# Patient Record
Sex: MALE | Race: WHITE | NOT HISPANIC OR LATINO | Employment: FULL TIME | URBAN - METROPOLITAN AREA
[De-identification: names, ages, dates, MRNs, and addresses within clinical notes are randomized per-mention and may not be internally consistent; named-entity substitution may affect disease eponyms.]

---
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
## 2021-02-10 ENCOUNTER — TELEPHONE (OUTPATIENT)
Dept: ENDOCRINOLOGY | Facility: CLINIC | Age: 21
End: 2021-02-10

## 2021-03-24 ENCOUNTER — PATIENT MESSAGE (OUTPATIENT)
Dept: ENDOCRINOLOGY | Facility: CLINIC | Age: 21
End: 2021-03-24

## 2021-03-24 ENCOUNTER — OFFICE VISIT (OUTPATIENT)
Dept: ENDOCRINOLOGY | Facility: CLINIC | Age: 21
End: 2021-03-24
Payer: MEDICAID

## 2021-03-24 VITALS
DIASTOLIC BLOOD PRESSURE: 90 MMHG | SYSTOLIC BLOOD PRESSURE: 132 MMHG | HEART RATE: 88 BPM | RESPIRATION RATE: 18 BRPM | HEIGHT: 68 IN | WEIGHT: 145.81 LBS | BODY MASS INDEX: 22.1 KG/M2

## 2021-03-24 DIAGNOSIS — E10.65 TYPE 1 DIABETES MELLITUS WITH HYPERGLYCEMIA: Primary | ICD-10-CM

## 2021-03-24 DIAGNOSIS — E10.65 TYPE 1 DIABETES MELLITUS WITH HYPERGLYCEMIA: ICD-10-CM

## 2021-03-24 DIAGNOSIS — E06.3 HYPOTHYROIDISM DUE TO HASHIMOTO'S THYROIDITIS: ICD-10-CM

## 2021-03-24 DIAGNOSIS — E10.9 TYPE 1 DIABETES MELLITUS WITHOUT COMPLICATION: ICD-10-CM

## 2021-03-24 DIAGNOSIS — E84.9 CYSTIC FIBROSIS: ICD-10-CM

## 2021-03-24 DIAGNOSIS — E03.8 HYPOTHYROIDISM DUE TO HASHIMOTO'S THYROIDITIS: ICD-10-CM

## 2021-03-24 LAB — GLUCOSE SERPL-MCNC: 389 MG/DL (ref 70–110)

## 2021-03-24 PROCEDURE — 99215 OFFICE O/P EST HI 40 MIN: CPT | Mod: PBBFAC,PO | Performed by: NURSE PRACTITIONER

## 2021-03-24 PROCEDURE — 99215 PR OFFICE/OUTPT VISIT, EST, LEVL V, 40-54 MIN: ICD-10-PCS | Mod: S$PBB,,, | Performed by: NURSE PRACTITIONER

## 2021-03-24 PROCEDURE — 99999 PR PBB SHADOW E&M-EST. PATIENT-LVL V: CPT | Mod: PBBFAC,,, | Performed by: NURSE PRACTITIONER

## 2021-03-24 PROCEDURE — 99215 OFFICE O/P EST HI 40 MIN: CPT | Mod: S$PBB,,, | Performed by: NURSE PRACTITIONER

## 2021-03-24 PROCEDURE — 99999 PR PBB SHADOW E&M-EST. PATIENT-LVL V: ICD-10-PCS | Mod: PBBFAC,,, | Performed by: NURSE PRACTITIONER

## 2021-03-24 PROCEDURE — 82962 GLUCOSE BLOOD TEST: CPT | Mod: PBBFAC,PO | Performed by: NURSE PRACTITIONER

## 2021-03-24 RX ORDER — INSULIN GLARGINE 100 [IU]/ML
INJECTION, SOLUTION SUBCUTANEOUS
Qty: 2 BOX | Refills: 2 | Status: SHIPPED | OUTPATIENT
Start: 2021-03-24 | End: 2022-05-09

## 2021-03-24 RX ORDER — LEVOTHYROXINE SODIUM 175 UG/1
175 TABLET ORAL
COMMUNITY
Start: 2021-02-08 | End: 2021-07-06 | Stop reason: SDUPTHER

## 2021-03-24 RX ORDER — BLOOD-GLUCOSE METER
EACH MISCELLANEOUS
Qty: 400 STRIP | Refills: 3 | Status: ON HOLD | OUTPATIENT
Start: 2021-03-24 | End: 2021-08-14 | Stop reason: HOSPADM

## 2021-03-24 RX ORDER — INSULIN ASPART INJECTION 100 [IU]/ML
INJECTION, SOLUTION SUBCUTANEOUS
Qty: 30 ML | Refills: 2 | Status: ON HOLD | OUTPATIENT
Start: 2021-03-24 | End: 2021-08-13 | Stop reason: CLARIF

## 2021-03-25 ENCOUNTER — PATIENT MESSAGE (OUTPATIENT)
Dept: ENDOCRINOLOGY | Facility: CLINIC | Age: 21
End: 2021-03-25

## 2021-03-25 RX ORDER — LANCETS
EACH MISCELLANEOUS
Qty: 150 EACH | Refills: 11 | Status: ON HOLD | OUTPATIENT
Start: 2021-03-25 | End: 2021-08-14 | Stop reason: HOSPADM

## 2021-03-25 RX ORDER — INSULIN PUMP SYRINGE, 3 ML
EACH MISCELLANEOUS
Qty: 1 EACH | Refills: 0 | Status: ON HOLD | OUTPATIENT
Start: 2021-03-25 | End: 2021-08-14 | Stop reason: HOSPADM

## 2021-05-06 ENCOUNTER — PATIENT MESSAGE (OUTPATIENT)
Dept: RESEARCH | Facility: HOSPITAL | Age: 21
End: 2021-05-06

## 2021-06-21 ENCOUNTER — TELEPHONE (OUTPATIENT)
Dept: ENDOCRINOLOGY | Facility: CLINIC | Age: 21
End: 2021-06-21

## 2021-07-01 ENCOUNTER — PATIENT MESSAGE (OUTPATIENT)
Dept: ENDOCRINOLOGY | Facility: CLINIC | Age: 21
End: 2021-07-01

## 2021-07-01 RX ORDER — URINE ACETONE TEST STRIPS
STRIP MISCELLANEOUS
Qty: 100 STRIP | Refills: 4 | Status: ON HOLD | OUTPATIENT
Start: 2021-07-01 | End: 2021-08-14 | Stop reason: HOSPADM

## 2021-07-06 RX ORDER — LEVOTHYROXINE SODIUM 175 UG/1
175 TABLET ORAL DAILY
Qty: 30 TABLET | Refills: 6 | Status: SHIPPED | OUTPATIENT
Start: 2021-07-06 | End: 2022-04-14 | Stop reason: SDUPTHER

## 2021-08-06 ENCOUNTER — PATIENT MESSAGE (OUTPATIENT)
Dept: PEDIATRICS | Facility: CLINIC | Age: 21
End: 2021-08-06

## 2021-08-06 DIAGNOSIS — R11.10 INTRACTABLE VOMITING, PRESENCE OF NAUSEA NOT SPECIFIED, UNSPECIFIED VOMITING TYPE: Primary | ICD-10-CM

## 2021-08-10 ENCOUNTER — TELEPHONE (OUTPATIENT)
Dept: GASTROENTEROLOGY | Facility: CLINIC | Age: 21
End: 2021-08-10

## 2021-08-10 ENCOUNTER — TELEPHONE (OUTPATIENT)
Dept: PEDIATRIC GASTROENTEROLOGY | Facility: CLINIC | Age: 21
End: 2021-08-10

## 2021-08-12 ENCOUNTER — PATIENT MESSAGE (OUTPATIENT)
Dept: PEDIATRICS | Facility: CLINIC | Age: 21
End: 2021-08-12

## 2021-08-12 ENCOUNTER — OFFICE VISIT (OUTPATIENT)
Dept: PEDIATRICS | Facility: CLINIC | Age: 21
End: 2021-08-12
Payer: MEDICAID

## 2021-08-12 DIAGNOSIS — R10.13 EPIGASTRIC ABDOMINAL PAIN: ICD-10-CM

## 2021-08-12 DIAGNOSIS — R19.7 DIARRHEA, UNSPECIFIED TYPE: ICD-10-CM

## 2021-08-12 DIAGNOSIS — R11.10 VOMITING, INTRACTABILITY OF VOMITING NOT SPECIFIED, PRESENCE OF NAUSEA NOT SPECIFIED, UNSPECIFIED VOMITING TYPE: Primary | ICD-10-CM

## 2021-08-12 PROBLEM — D72.829 LEUKOCYTOSIS: Status: ACTIVE | Noted: 2021-08-12

## 2021-08-12 PROBLEM — E86.0 DEHYDRATION: Status: ACTIVE | Noted: 2021-08-12

## 2021-08-12 PROBLEM — K31.84 GASTROPARESIS: Status: ACTIVE | Noted: 2021-08-12

## 2021-08-12 PROBLEM — Z71.89 ACP (ADVANCE CARE PLANNING): Status: ACTIVE | Noted: 2017-09-25

## 2021-08-12 PROCEDURE — 99214 OFFICE O/P EST MOD 30 MIN: CPT | Mod: 95,,, | Performed by: PEDIATRICS

## 2021-08-12 PROCEDURE — 99214 PR OFFICE/OUTPT VISIT, EST, LEVL IV, 30-39 MIN: ICD-10-PCS | Mod: 95,,, | Performed by: PEDIATRICS

## 2021-08-13 PROBLEM — E87.20 LACTIC ACIDOSIS: Status: ACTIVE | Noted: 2021-08-13

## 2021-08-20 ENCOUNTER — TELEPHONE (OUTPATIENT)
Dept: ENDOCRINOLOGY | Facility: CLINIC | Age: 21
End: 2021-08-20

## 2021-08-25 PROBLEM — E11.10 DIABETIC KETOACIDOSIS: Status: ACTIVE | Noted: 2021-08-25

## 2021-09-23 ENCOUNTER — TELEPHONE (OUTPATIENT)
Dept: PEDIATRICS | Facility: CLINIC | Age: 21
End: 2021-09-23
Payer: MEDICAID

## 2021-09-23 RX ORDER — FAMOTIDINE 20 MG/1
20 TABLET, FILM COATED ORAL 2 TIMES DAILY
Qty: 20 TABLET | Refills: 0 | Status: SHIPPED | OUTPATIENT
Start: 2021-09-23 | End: 2021-09-23 | Stop reason: SDUPTHER

## 2021-09-23 RX ORDER — FAMOTIDINE 20 MG/1
20 TABLET, FILM COATED ORAL 2 TIMES DAILY
Qty: 20 TABLET | Refills: 0 | Status: SHIPPED | OUTPATIENT
Start: 2021-09-23 | End: 2021-10-26

## 2021-09-24 ENCOUNTER — HOSPITAL ENCOUNTER (EMERGENCY)
Facility: HOSPITAL | Age: 21
Discharge: HOME | End: 2021-09-24
Attending: EMERGENCY MEDICINE
Payer: MEDICAID

## 2021-09-24 VITALS
WEIGHT: 150 LBS | SYSTOLIC BLOOD PRESSURE: 128 MMHG | RESPIRATION RATE: 17 BRPM | TEMPERATURE: 98 F | HEART RATE: 88 BPM | DIASTOLIC BLOOD PRESSURE: 77 MMHG | HEIGHT: 68 IN | BODY MASS INDEX: 22.73 KG/M2 | OXYGEN SATURATION: 100 %

## 2021-09-24 DIAGNOSIS — K31.84 GASTROPARESIS: ICD-10-CM

## 2021-09-24 DIAGNOSIS — R11.2 NAUSEA AND VOMITING, INTRACTABILITY OF VOMITING NOT SPECIFIED, UNSPECIFIED VOMITING TYPE: Primary | ICD-10-CM

## 2021-09-24 DIAGNOSIS — R73.9 HYPERGLYCEMIA: ICD-10-CM

## 2021-09-24 LAB
ALBUMIN SERPL BCP-MCNC: 4.2 G/DL (ref 3.5–5.2)
ALLENS TEST: ABNORMAL
ALP SERPL-CCNC: 102 U/L (ref 55–135)
ALT SERPL W/O P-5'-P-CCNC: 22 U/L (ref 10–44)
AMORPH CRY UR QL COMP ASSIST: NORMAL
ANION GAP SERPL CALC-SCNC: 20 MMOL/L (ref 8–16)
AST SERPL-CCNC: 19 U/L (ref 10–40)
B-OH-BUTYR BLD STRIP-SCNC: 0.3 MMOL/L (ref 0–0.5)
BACTERIA #/AREA URNS AUTO: NORMAL /HPF
BASOPHILS # BLD AUTO: 0.05 K/UL (ref 0–0.2)
BASOPHILS NFR BLD: 0.5 % (ref 0–1.9)
BILIRUB SERPL-MCNC: 1 MG/DL (ref 0.1–1)
BILIRUB UR QL STRIP: NEGATIVE
BUN SERPL-MCNC: 14 MG/DL (ref 6–20)
CALCIUM SERPL-MCNC: 10.5 MG/DL (ref 8.7–10.5)
CHLORIDE SERPL-SCNC: 93 MMOL/L (ref 95–110)
CLARITY UR REFRACT.AUTO: ABNORMAL
CO2 SERPL-SCNC: 24 MMOL/L (ref 23–29)
COLOR UR AUTO: YELLOW
CREAT SERPL-MCNC: 0.9 MG/DL (ref 0.5–1.4)
DELSYS: ABNORMAL
DIFFERENTIAL METHOD: ABNORMAL
EOSINOPHIL # BLD AUTO: 0.1 K/UL (ref 0–0.5)
EOSINOPHIL NFR BLD: 1.2 % (ref 0–8)
ERYTHROCYTE [DISTWIDTH] IN BLOOD BY AUTOMATED COUNT: 12.5 % (ref 11.5–14.5)
EST. GFR  (AFRICAN AMERICAN): >60 ML/MIN/1.73 M^2
EST. GFR  (NON AFRICAN AMERICAN): >60 ML/MIN/1.73 M^2
GLUCOSE SERPL-MCNC: 128 MG/DL (ref 70–110)
GLUCOSE UR QL STRIP: ABNORMAL
HCO3 UR-SCNC: 32.3 MMOL/L (ref 24–28)
HCT VFR BLD AUTO: 45.2 % (ref 40–54)
HCV AB SERPL QL IA: NEGATIVE
HGB BLD-MCNC: 16.2 G/DL (ref 14–18)
HGB UR QL STRIP: NEGATIVE
HIV 1+2 AB+HIV1 P24 AG SERPL QL IA: NEGATIVE
IMM GRANULOCYTES # BLD AUTO: 0.04 K/UL (ref 0–0.04)
IMM GRANULOCYTES NFR BLD AUTO: 0.4 % (ref 0–0.5)
KETONES UR QL STRIP: ABNORMAL
LACTATE SERPL-SCNC: 2.1 MMOL/L (ref 0.5–2.2)
LEUKOCYTE ESTERASE UR QL STRIP: NEGATIVE
LIPASE SERPL-CCNC: 50 U/L (ref 4–60)
LYMPHOCYTES # BLD AUTO: 2.4 K/UL (ref 1–4.8)
LYMPHOCYTES NFR BLD: 21.7 % (ref 18–48)
MCH RBC QN AUTO: 30.3 PG (ref 27–31)
MCHC RBC AUTO-ENTMCNC: 35.8 G/DL (ref 32–36)
MCV RBC AUTO: 85 FL (ref 82–98)
MICROSCOPIC COMMENT: NORMAL
MONOCYTES # BLD AUTO: 0.7 K/UL (ref 0.3–1)
MONOCYTES NFR BLD: 6.8 % (ref 4–15)
NEUTROPHILS # BLD AUTO: 7.5 K/UL (ref 1.8–7.7)
NEUTROPHILS NFR BLD: 69.4 % (ref 38–73)
NITRITE UR QL STRIP: NEGATIVE
NRBC BLD-RTO: 0 /100 WBC
PCO2 BLDA: 47.3 MMHG (ref 35–45)
PH SMN: 7.44 [PH] (ref 7.35–7.45)
PH UR STRIP: 5 [PH] (ref 5–8)
PLATELET # BLD AUTO: 395 K/UL (ref 150–450)
PMV BLD AUTO: 9.1 FL (ref 9.2–12.9)
PO2 BLDA: 27 MMHG (ref 40–60)
POC BE: 8 MMOL/L
POC SATURATED O2: 53 % (ref 95–100)
POC TCO2: 34 MMOL/L (ref 24–29)
POCT GLUCOSE: 131 MG/DL (ref 70–110)
POTASSIUM SERPL-SCNC: 3.5 MMOL/L (ref 3.5–5.1)
PROT SERPL-MCNC: 8.2 G/DL (ref 6–8.4)
PROT UR QL STRIP: NEGATIVE
RBC # BLD AUTO: 5.34 M/UL (ref 4.6–6.2)
SAMPLE: ABNORMAL
SITE: ABNORMAL
SODIUM SERPL-SCNC: 137 MMOL/L (ref 136–145)
SP GR UR STRIP: 1.02 (ref 1–1.03)
URN SPEC COLLECT METH UR: ABNORMAL
WBC # BLD AUTO: 10.83 K/UL (ref 3.9–12.7)
WBC #/AREA URNS AUTO: 1 /HPF (ref 0–5)

## 2021-09-24 PROCEDURE — 96360 HYDRATION IV INFUSION INIT: CPT

## 2021-09-24 PROCEDURE — 80047 BASIC METABLC PNL IONIZED CA: CPT

## 2021-09-24 PROCEDURE — 87389 HIV-1 AG W/HIV-1&-2 AB AG IA: CPT | Performed by: EMERGENCY MEDICINE

## 2021-09-24 PROCEDURE — 99285 PR EMERGENCY DEPT VISIT,LEVEL V: ICD-10-PCS | Mod: ,,, | Performed by: EMERGENCY MEDICINE

## 2021-09-24 PROCEDURE — 83605 ASSAY OF LACTIC ACID: CPT | Performed by: EMERGENCY MEDICINE

## 2021-09-24 PROCEDURE — 93005 ELECTROCARDIOGRAM TRACING: CPT

## 2021-09-24 PROCEDURE — 99900035 HC TECH TIME PER 15 MIN (STAT)

## 2021-09-24 PROCEDURE — 86803 HEPATITIS C AB TEST: CPT | Performed by: EMERGENCY MEDICINE

## 2021-09-24 PROCEDURE — 82010 KETONE BODYS QUAN: CPT | Performed by: EMERGENCY MEDICINE

## 2021-09-24 PROCEDURE — 80053 COMPREHEN METABOLIC PANEL: CPT | Performed by: EMERGENCY MEDICINE

## 2021-09-24 PROCEDURE — 82962 GLUCOSE BLOOD TEST: CPT

## 2021-09-24 PROCEDURE — 99284 EMERGENCY DEPT VISIT MOD MDM: CPT | Mod: 25

## 2021-09-24 PROCEDURE — 99285 EMERGENCY DEPT VISIT HI MDM: CPT | Mod: ,,, | Performed by: EMERGENCY MEDICINE

## 2021-09-24 PROCEDURE — 85025 COMPLETE CBC W/AUTO DIFF WBC: CPT | Performed by: EMERGENCY MEDICINE

## 2021-09-24 PROCEDURE — 93010 EKG 12-LEAD: ICD-10-PCS | Mod: ,,, | Performed by: INTERNAL MEDICINE

## 2021-09-24 PROCEDURE — 83690 ASSAY OF LIPASE: CPT | Performed by: EMERGENCY MEDICINE

## 2021-09-24 PROCEDURE — 82330 ASSAY OF CALCIUM: CPT

## 2021-09-24 PROCEDURE — 25000003 PHARM REV CODE 250: Performed by: EMERGENCY MEDICINE

## 2021-09-24 PROCEDURE — 82803 BLOOD GASES ANY COMBINATION: CPT

## 2021-09-24 PROCEDURE — 81001 URINALYSIS AUTO W/SCOPE: CPT | Performed by: EMERGENCY MEDICINE

## 2021-09-24 PROCEDURE — 93010 ELECTROCARDIOGRAM REPORT: CPT | Mod: ,,, | Performed by: INTERNAL MEDICINE

## 2021-09-24 RX ORDER — ONDANSETRON 4 MG/1
4 TABLET, ORALLY DISINTEGRATING ORAL EVERY 6 HOURS PRN
Qty: 10 TABLET | Refills: 0 | Status: SHIPPED | OUTPATIENT
Start: 2021-09-24 | End: 2021-10-05

## 2021-09-24 RX ADMIN — SODIUM CHLORIDE 1000 ML: 0.9 INJECTION, SOLUTION INTRAVENOUS at 11:09

## 2021-09-25 LAB
BUN SERPL-MCNC: 15 MG/DL (ref 6–30)
CHLORIDE SERPL-SCNC: 97 MMOL/L (ref 95–110)
CREAT SERPL-MCNC: 0.8 MG/DL (ref 0.5–1.4)
GLUCOSE SERPL-MCNC: 138 MG/DL (ref 70–110)
HCT VFR BLD CALC: 46 %PCV (ref 36–54)
POC IONIZED CALCIUM: 1.21 MMOL/L (ref 1.06–1.42)
POC TCO2 (MEASURED): 26 MMOL/L (ref 23–29)
POTASSIUM BLD-SCNC: 3.5 MMOL/L (ref 3.5–5.1)
SAMPLE: ABNORMAL
SODIUM BLD-SCNC: 137 MMOL/L (ref 136–145)

## 2021-09-27 ENCOUNTER — TELEPHONE (OUTPATIENT)
Dept: GASTROENTEROLOGY | Facility: CLINIC | Age: 21
End: 2021-09-27

## 2021-10-04 ENCOUNTER — PATIENT MESSAGE (OUTPATIENT)
Dept: PEDIATRICS | Facility: CLINIC | Age: 21
End: 2021-10-04

## 2021-10-04 DIAGNOSIS — R11.10 VOMITING, INTRACTABILITY OF VOMITING NOT SPECIFIED, PRESENCE OF NAUSEA NOT SPECIFIED, UNSPECIFIED VOMITING TYPE: ICD-10-CM

## 2021-10-04 DIAGNOSIS — R11.10 VOMITING IN CHILD: Primary | ICD-10-CM

## 2021-10-05 RX ORDER — PROMETHAZINE HYDROCHLORIDE 12.5 MG/1
12.5 SUPPOSITORY RECTAL EVERY 6 HOURS PRN
Qty: 20 SUPPOSITORY | Refills: 1 | Status: SHIPPED | OUTPATIENT
Start: 2021-10-05 | End: 2021-10-26

## 2021-10-25 ENCOUNTER — PATIENT MESSAGE (OUTPATIENT)
Dept: ENDOCRINOLOGY | Facility: CLINIC | Age: 21
End: 2021-10-25
Payer: MEDICAID

## 2021-10-26 ENCOUNTER — TELEPHONE (OUTPATIENT)
Dept: ENDOCRINOLOGY | Facility: CLINIC | Age: 21
End: 2021-10-26
Payer: MEDICAID

## 2021-10-26 ENCOUNTER — PATIENT MESSAGE (OUTPATIENT)
Dept: ENDOCRINOLOGY | Facility: CLINIC | Age: 21
End: 2021-10-26
Payer: MEDICAID

## 2021-10-27 ENCOUNTER — TELEPHONE (OUTPATIENT)
Dept: ENDOCRINOLOGY | Facility: CLINIC | Age: 21
End: 2021-10-27

## 2021-10-27 ENCOUNTER — PATIENT MESSAGE (OUTPATIENT)
Dept: PEDIATRICS | Facility: CLINIC | Age: 21
End: 2021-10-27
Payer: MEDICAID

## 2021-10-27 ENCOUNTER — OFFICE VISIT (OUTPATIENT)
Dept: ENDOCRINOLOGY | Facility: CLINIC | Age: 21
End: 2021-10-27
Payer: MEDICAID

## 2021-10-27 ENCOUNTER — PATIENT MESSAGE (OUTPATIENT)
Dept: ENDOCRINOLOGY | Facility: CLINIC | Age: 21
End: 2021-10-27

## 2021-10-27 DIAGNOSIS — E06.3 HYPOTHYROIDISM DUE TO HASHIMOTO'S THYROIDITIS: ICD-10-CM

## 2021-10-27 DIAGNOSIS — E84.9 CYSTIC FIBROSIS: ICD-10-CM

## 2021-10-27 DIAGNOSIS — E10.65 TYPE 1 DIABETES MELLITUS WITH HYPERGLYCEMIA: Primary | ICD-10-CM

## 2021-10-27 DIAGNOSIS — E03.8 HYPOTHYROIDISM DUE TO HASHIMOTO'S THYROIDITIS: ICD-10-CM

## 2021-10-27 PROCEDURE — 99214 OFFICE O/P EST MOD 30 MIN: CPT | Mod: 95,,, | Performed by: NURSE PRACTITIONER

## 2021-10-27 PROCEDURE — 99214 PR OFFICE/OUTPT VISIT, EST, LEVL IV, 30-39 MIN: ICD-10-PCS | Mod: 95,,, | Performed by: NURSE PRACTITIONER

## 2021-10-28 ENCOUNTER — TELEPHONE (OUTPATIENT)
Dept: ENDOCRINOLOGY | Facility: CLINIC | Age: 21
End: 2021-10-28
Payer: MEDICAID

## 2021-10-29 ENCOUNTER — TELEPHONE (OUTPATIENT)
Dept: ENDOCRINOLOGY | Facility: CLINIC | Age: 21
End: 2021-10-29
Payer: MEDICAID

## 2021-11-02 ENCOUNTER — TELEPHONE (OUTPATIENT)
Dept: ENDOCRINOLOGY | Facility: CLINIC | Age: 21
End: 2021-11-02
Payer: MEDICAID

## 2021-11-02 DIAGNOSIS — E10.43 TYPE 1 DIABETES MELLITUS WITH DIABETIC AUTONOMIC NEUROPATHY: Primary | ICD-10-CM

## 2021-11-03 ENCOUNTER — PATIENT MESSAGE (OUTPATIENT)
Dept: ENDOCRINOLOGY | Facility: CLINIC | Age: 21
End: 2021-11-03
Payer: MEDICAID

## 2021-11-08 ENCOUNTER — PATIENT MESSAGE (OUTPATIENT)
Dept: ENDOCRINOLOGY | Facility: CLINIC | Age: 21
End: 2021-11-08
Payer: MEDICAID

## 2021-11-10 ENCOUNTER — LAB VISIT (OUTPATIENT)
Dept: LAB | Facility: HOSPITAL | Age: 21
End: 2021-11-10
Attending: NURSE PRACTITIONER
Payer: MEDICAID

## 2021-11-10 DIAGNOSIS — E10.43 TYPE 1 DIABETES MELLITUS WITH DIABETIC AUTONOMIC NEUROPATHY: ICD-10-CM

## 2021-11-10 LAB
C PEPTIDE SERPL-MCNC: <0.08 NG/ML (ref 0.78–5.19)
GLUCOSE SERPL-MCNC: 261 MG/DL (ref 70–110)

## 2021-11-10 PROCEDURE — 84681 ASSAY OF C-PEPTIDE: CPT | Performed by: NURSE PRACTITIONER

## 2021-11-10 PROCEDURE — 36415 COLL VENOUS BLD VENIPUNCTURE: CPT | Mod: PO | Performed by: NURSE PRACTITIONER

## 2021-11-10 PROCEDURE — 82947 ASSAY GLUCOSE BLOOD QUANT: CPT | Performed by: NURSE PRACTITIONER

## 2021-11-11 ENCOUNTER — TELEPHONE (OUTPATIENT)
Dept: ENDOCRINOLOGY | Facility: CLINIC | Age: 21
End: 2021-11-11
Payer: MEDICAID

## 2021-11-16 ENCOUNTER — TELEPHONE (OUTPATIENT)
Dept: ENDOCRINOLOGY | Facility: CLINIC | Age: 21
End: 2021-11-16
Payer: MEDICAID

## 2021-11-16 DIAGNOSIS — E10.9 TYPE 1 DIABETES MELLITUS WITHOUT COMPLICATION: Primary | ICD-10-CM

## 2021-12-02 ENCOUNTER — LAB VISIT (OUTPATIENT)
Dept: LAB | Facility: HOSPITAL | Age: 21
End: 2021-12-02
Attending: NURSE PRACTITIONER
Payer: MEDICAID

## 2021-12-02 DIAGNOSIS — E06.3 HYPOTHYROIDISM DUE TO HASHIMOTO'S THYROIDITIS: ICD-10-CM

## 2021-12-02 DIAGNOSIS — E03.8 HYPOTHYROIDISM DUE TO HASHIMOTO'S THYROIDITIS: ICD-10-CM

## 2021-12-02 DIAGNOSIS — E10.9 TYPE 1 DIABETES MELLITUS WITHOUT COMPLICATION: ICD-10-CM

## 2021-12-02 DIAGNOSIS — E10.65 TYPE 1 DIABETES MELLITUS WITH HYPERGLYCEMIA: ICD-10-CM

## 2021-12-02 LAB
ESTIMATED AVG GLUCOSE: 200 MG/DL (ref 68–131)
GLUCOSE SERPL-MCNC: 143 MG/DL (ref 70–110)
HBA1C MFR BLD: 8.6 % (ref 4–5.6)

## 2021-12-02 PROCEDURE — 86341 ISLET CELL ANTIBODY: CPT | Performed by: NURSE PRACTITIONER

## 2021-12-02 PROCEDURE — 84439 ASSAY OF FREE THYROXINE: CPT | Performed by: NURSE PRACTITIONER

## 2021-12-02 PROCEDURE — 84681 ASSAY OF C-PEPTIDE: CPT | Performed by: NURSE PRACTITIONER

## 2021-12-02 PROCEDURE — 82947 ASSAY GLUCOSE BLOOD QUANT: CPT | Performed by: NURSE PRACTITIONER

## 2021-12-02 PROCEDURE — 86341 ISLET CELL ANTIBODY: CPT | Mod: 91 | Performed by: NURSE PRACTITIONER

## 2021-12-02 PROCEDURE — 83036 HEMOGLOBIN GLYCOSYLATED A1C: CPT | Performed by: NURSE PRACTITIONER

## 2021-12-02 PROCEDURE — 36415 COLL VENOUS BLD VENIPUNCTURE: CPT | Mod: PO | Performed by: NURSE PRACTITIONER

## 2021-12-02 PROCEDURE — 84443 ASSAY THYROID STIM HORMONE: CPT | Performed by: NURSE PRACTITIONER

## 2021-12-03 LAB
C PEPTIDE SERPL-MCNC: <0.08 NG/ML (ref 0.78–5.19)
T4 FREE SERPL-MCNC: 1.28 NG/DL (ref 0.71–1.51)
TSH SERPL DL<=0.005 MIU/L-ACNC: 5.77 UIU/ML (ref 0.4–4)

## 2021-12-08 LAB
GAD65 AB SER-SCNC: 0.58 NMOL/L
PANC ISLET CELL IGG SER-ACNC: NORMAL

## 2021-12-09 ENCOUNTER — TELEPHONE (OUTPATIENT)
Dept: ENDOCRINOLOGY | Facility: CLINIC | Age: 21
End: 2021-12-09
Payer: MEDICAID
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
## 2021-12-21 ENCOUNTER — PATIENT MESSAGE (OUTPATIENT)
Dept: ENDOCRINOLOGY | Facility: CLINIC | Age: 21
End: 2021-12-21
Payer: MEDICAID

## 2021-12-22 ENCOUNTER — PATIENT MESSAGE (OUTPATIENT)
Dept: ENDOCRINOLOGY | Facility: CLINIC | Age: 21
End: 2021-12-22
Payer: MEDICAID

## 2022-01-12 DIAGNOSIS — E10.9 TYPE 1 DIABETES MELLITUS WITHOUT COMPLICATIONS: ICD-10-CM

## 2022-01-12 RX ORDER — INSULIN ASPART 100 [IU]/ML
INJECTION, SOLUTION INTRAVENOUS; SUBCUTANEOUS
Qty: 30 ML | Refills: 2 | Status: SHIPPED | OUTPATIENT
Start: 2022-01-12 | End: 2022-01-13

## 2022-01-12 NOTE — TELEPHONE ENCOUNTER
----- Message from Sawyer Garcia sent at 1/12/2022  9:24 AM CST -----  Contact: PT  Type: Needs Medical Advice    Who Called: pt  Best Call Back Number: [phone]       Requesting a call back regarding - need a refill on NOVOLOG FLEXPEN U-100 INSULIN 100 unit/mL (3 mL) InPn pen      [facility] [place]  [id number];        Please Advise- Thank you

## 2022-02-02 ENCOUNTER — PATIENT MESSAGE (OUTPATIENT)
Dept: ENDOCRINOLOGY | Facility: CLINIC | Age: 22
End: 2022-02-02
Payer: MEDICAID

## 2022-02-02 DIAGNOSIS — E10.43 TYPE 1 DIABETES MELLITUS WITH DIABETIC AUTONOMIC NEUROPATHY: Primary | ICD-10-CM

## 2022-02-02 RX ORDER — INSULIN ASPART 100 [IU]/ML
INJECTION, SOLUTION INTRAVENOUS; SUBCUTANEOUS
Qty: 30 ML | Refills: 5 | Status: SHIPPED | OUTPATIENT
Start: 2022-02-02 | End: 2022-08-18

## 2022-03-01 ENCOUNTER — PATIENT MESSAGE (OUTPATIENT)
Dept: ENDOCRINOLOGY | Facility: CLINIC | Age: 22
End: 2022-03-01
Payer: MEDICAID

## 2022-03-10 ENCOUNTER — TELEPHONE (OUTPATIENT)
Dept: ENDOCRINOLOGY | Facility: CLINIC | Age: 22
End: 2022-03-10
Payer: MEDICAID

## 2022-03-26 ENCOUNTER — PATIENT MESSAGE (OUTPATIENT)
Dept: ENDOCRINOLOGY | Facility: CLINIC | Age: 22
End: 2022-03-26
Payer: MEDICAID

## 2022-03-28 ENCOUNTER — PATIENT MESSAGE (OUTPATIENT)
Dept: ENDOCRINOLOGY | Facility: CLINIC | Age: 22
End: 2022-03-28
Payer: MEDICAID

## 2022-04-11 ENCOUNTER — OFFICE VISIT (OUTPATIENT)
Dept: ENDOCRINOLOGY | Facility: CLINIC | Age: 22
End: 2022-04-11
Payer: COMMERCIAL

## 2022-04-11 ENCOUNTER — LAB VISIT (OUTPATIENT)
Dept: LAB | Facility: HOSPITAL | Age: 22
End: 2022-04-11
Attending: NURSE PRACTITIONER
Payer: COMMERCIAL

## 2022-04-11 VITALS
HEIGHT: 68 IN | DIASTOLIC BLOOD PRESSURE: 70 MMHG | BODY MASS INDEX: 21.57 KG/M2 | SYSTOLIC BLOOD PRESSURE: 120 MMHG | WEIGHT: 142.31 LBS | HEART RATE: 99 BPM

## 2022-04-11 DIAGNOSIS — E03.8 HYPOTHYROIDISM DUE TO HASHIMOTO'S THYROIDITIS: ICD-10-CM

## 2022-04-11 DIAGNOSIS — E10.43 TYPE 1 DIABETES MELLITUS WITH DIABETIC AUTONOMIC NEUROPATHY: Primary | ICD-10-CM

## 2022-04-11 DIAGNOSIS — E10.43 TYPE 1 DIABETES MELLITUS WITH DIABETIC AUTONOMIC NEUROPATHY: ICD-10-CM

## 2022-04-11 DIAGNOSIS — E06.3 HYPOTHYROIDISM DUE TO HASHIMOTO'S THYROIDITIS: ICD-10-CM

## 2022-04-11 DIAGNOSIS — E84.9 CYSTIC FIBROSIS: ICD-10-CM

## 2022-04-11 LAB
ESTIMATED AVG GLUCOSE: 157 MG/DL (ref 68–131)
HBA1C MFR BLD: 7.1 % (ref 4–5.6)
TSH SERPL DL<=0.005 MIU/L-ACNC: 6.78 UIU/ML (ref 0.4–4)

## 2022-04-11 PROCEDURE — 95251 CONT GLUC MNTR ANALYSIS I&R: CPT | Mod: S$GLB,,, | Performed by: NURSE PRACTITIONER

## 2022-04-11 PROCEDURE — 3074F SYST BP LT 130 MM HG: CPT | Mod: CPTII,S$GLB,, | Performed by: NURSE PRACTITIONER

## 2022-04-11 PROCEDURE — 3078F DIAST BP <80 MM HG: CPT | Mod: CPTII,S$GLB,, | Performed by: NURSE PRACTITIONER

## 2022-04-11 PROCEDURE — 99214 OFFICE O/P EST MOD 30 MIN: CPT | Mod: S$GLB,,, | Performed by: NURSE PRACTITIONER

## 2022-04-11 PROCEDURE — 99214 PR OFFICE/OUTPT VISIT, EST, LEVL IV, 30-39 MIN: ICD-10-PCS | Mod: S$GLB,,, | Performed by: NURSE PRACTITIONER

## 2022-04-11 PROCEDURE — 84439 ASSAY OF FREE THYROXINE: CPT | Performed by: NURSE PRACTITIONER

## 2022-04-11 PROCEDURE — 3074F PR MOST RECENT SYSTOLIC BLOOD PRESSURE < 130 MM HG: ICD-10-PCS | Mod: CPTII,S$GLB,, | Performed by: NURSE PRACTITIONER

## 2022-04-11 PROCEDURE — 3008F PR BODY MASS INDEX (BMI) DOCUMENTED: ICD-10-PCS | Mod: CPTII,S$GLB,, | Performed by: NURSE PRACTITIONER

## 2022-04-11 PROCEDURE — 36415 COLL VENOUS BLD VENIPUNCTURE: CPT | Mod: PO | Performed by: NURSE PRACTITIONER

## 2022-04-11 PROCEDURE — 95251 PR GLUCOSE MONITOR, 72 HOUR, PHYS INTERP: ICD-10-PCS | Mod: S$GLB,,, | Performed by: NURSE PRACTITIONER

## 2022-04-11 PROCEDURE — 1159F MED LIST DOCD IN RCRD: CPT | Mod: CPTII,S$GLB,, | Performed by: NURSE PRACTITIONER

## 2022-04-11 PROCEDURE — 83036 HEMOGLOBIN GLYCOSYLATED A1C: CPT | Performed by: NURSE PRACTITIONER

## 2022-04-11 PROCEDURE — 3052F HG A1C>EQUAL 8.0%<EQUAL 9.0%: CPT | Mod: CPTII,S$GLB,, | Performed by: NURSE PRACTITIONER

## 2022-04-11 PROCEDURE — 84443 ASSAY THYROID STIM HORMONE: CPT | Performed by: NURSE PRACTITIONER

## 2022-04-11 PROCEDURE — 3078F PR MOST RECENT DIASTOLIC BLOOD PRESSURE < 80 MM HG: ICD-10-PCS | Mod: CPTII,S$GLB,, | Performed by: NURSE PRACTITIONER

## 2022-04-11 PROCEDURE — 99999 PR PBB SHADOW E&M-EST. PATIENT-LVL IV: ICD-10-PCS | Mod: PBBFAC,,, | Performed by: NURSE PRACTITIONER

## 2022-04-11 PROCEDURE — 1160F RVW MEDS BY RX/DR IN RCRD: CPT | Mod: CPTII,S$GLB,, | Performed by: NURSE PRACTITIONER

## 2022-04-11 PROCEDURE — 99214 OFFICE O/P EST MOD 30 MIN: CPT | Mod: PBBFAC,PO | Performed by: NURSE PRACTITIONER

## 2022-04-11 PROCEDURE — 1159F PR MEDICATION LIST DOCUMENTED IN MEDICAL RECORD: ICD-10-PCS | Mod: CPTII,S$GLB,, | Performed by: NURSE PRACTITIONER

## 2022-04-11 PROCEDURE — 3052F PR MOST RECENT HEMOGLOBIN A1C LEVEL 8.0 - < 9.0%: ICD-10-PCS | Mod: CPTII,S$GLB,, | Performed by: NURSE PRACTITIONER

## 2022-04-11 PROCEDURE — 1160F PR REVIEW ALL MEDS BY PRESCRIBER/CLIN PHARMACIST DOCUMENTED: ICD-10-PCS | Mod: CPTII,S$GLB,, | Performed by: NURSE PRACTITIONER

## 2022-04-11 PROCEDURE — 3008F BODY MASS INDEX DOCD: CPT | Mod: CPTII,S$GLB,, | Performed by: NURSE PRACTITIONER

## 2022-04-11 PROCEDURE — 99999 PR PBB SHADOW E&M-EST. PATIENT-LVL IV: CPT | Mod: PBBFAC,,, | Performed by: NURSE PRACTITIONER

## 2022-04-11 NOTE — PROGRESS NOTES
CC: Mr. Zoran Corado arrives today for management of Type 1  DM and review of chronic medical conditions, as listed in the visit diagnosis section of this encounter.     HPI: Mr. Zoran Corado was diagnosed with Type 1  DM at age 7. This was prior to his CF diagnosis. Denies FH of DM. He has had multiple hospital admissions for nausea/vomiting (has gastroparesis) and DKA. He previously used Tandem T-slim pump with Dexcom. He hasn't used pump since ~2019. He didn't like wearing a device, as it interfered with his physical job.  His other medical conditions include hypothyroidism and cystic fibrosis (dx in 2015). He reports previous episodes of pancreatitis.   Previously seen in endocrine by Dr. Sandifer in June 2020.    Patient was last seen by me in October over virtual visit.     In December, he upgraded from older model Tandem pump to Tandem T-Slim X2 pump with Control IQ technology.    Patient is following with gastroenterology for gastroparesis. Had been seeing Dr. Polanco. However, pt reports that since resuming pump, his symptoms have improved. No longer taking Pepcid and Reglan.    BG monitoring per Dexcom G6.  Had been off Dexcom for sometime because he needed to change DME companies. He recently paired Dexcom to his pump on Friday so just was able to resume Control IQ then.    Hypoglycemia: Occasionally overnight or after correction.  Hypoglycemic Symptoms: sweating and fatigue  Hypoglycemia Treatment: Coke     Exercise: No    Dietary Habits: Eats 2 meals/day. May skip breakfast and have chips or nothing at all. May snack during the day. Avoids sugary beverages.    Last DM education appointment: three month program in 2019.     Patient admits to taking levothyroxine by itself with water only at least 30 min before eating.       CURRENT DIABETIC MEDS: Novolog in Tandem T-Slim X2 pump with Control IQ technology  Glucometer type: One Touch Verio Flex  Insulin back up plan: Lantus to 13 units BID, Novolog  "I:C ratio 1:8 + correction.       When did you start the current therapy you are on: 12/2021  Frequency of changing site/infusion set/tubing/cartridges: 3 days  Frequency of changing CGM: 10 days  Using bolus wizard: yes  Taking bolus with each meal: yes      PUMP SETTINGS:    Basal:  0000 - 1.2 u/hr  0800 - 1 u/hr    I:C ratio:  0000 - 10  0800 - 8    ISF:  0000 - 30    Target: 130    Insulin on board:  3 hours      Last Eye Exam: 12/2019. Patient denies DRHelen Has upcoming appt.   Last Podiatry Exam: n/a    REVIEW OF SYSTEMS  Constitutional: no c/o fatigue, weakness, weight loss.   Eyes: denies visual disturbances.  Cardiac: no palpitations or chest pain.  Respiratory: no cough or dyspnea.  GI: no abdominal pain or nausea. + h/o pancreatitis.   Skin: no lesions or rashes.  Neuro: + parasthesias in feet, lower legs nightly.   Endocrine: denies polyphagia, polydipsia, polyuria       Personally reviewed Past Medical, Surgical, Social History.    Vital Signs  /70   Pulse 99   Ht 5' 8" (1.727 m)   Wt 64.5 kg (142 lb 4.9 oz)   BMI 21.64 kg/m²      Personally reviewed the below labs:    Hemoglobin A1C   Date Value Ref Range Status   12/02/2021 8.6 (H) 4.0 - 5.6 % Final     Comment:     ADA Screening Guidelines:  5.7-6.4%  Consistent with prediabetes  >or=6.5%  Consistent with diabetes    High levels of fetal hemoglobin interfere with the HbA1C  assay. Heterozygous hemoglobin variants (HbS, HgC, etc)do  not significantly interfere with this assay.   However, presence of multiple variants may affect accuracy.     08/12/2021 8.7 (H) 0.0 - 5.6 % Final     Comment:     Reference Interval:  5.0 - 5.6 Normal   5.7 - 6.4 High Risk   > 6.5 Diabetic      Hgb A1c results are standardized based on the (NGSP) National   Glycohemoglobin Standardization Program.      Hemoglobin A1C levels are related to mean serum/plasma glucose   during the preceding 2-3 months.        08/06/2021 8.2 (H) 0.0 - 5.6 % Final     Comment:     " Reference Interval:  5.0 - 5.6 Normal   5.7 - 6.4 High Risk   > 6.5 Diabetic      Hgb A1c results are standardized based on the (NGSP) National   Glycohemoglobin Standardization Program.      Hemoglobin A1C levels are related to mean serum/plasma glucose   during the preceding 2-3 months.            Chemistry        Component Value Date/Time     (L) 01/04/2022 1239    K 2.4 (LL) 01/04/2022 1239    CL 83 (L) 01/04/2022 1239    CO2 31 01/04/2022 1239    BUN 18 01/04/2022 1239    CREATININE 0.88 01/04/2022 1239    GLU 97 01/04/2022 1239    GLU >500 09/09/2018 0433        Component Value Date/Time    CALCIUM 10.2 01/04/2022 1239    ALKPHOS 92 01/04/2022 1239    AST 39 01/04/2022 1239    AST 30 04/12/2016 1113    ALT 28 01/04/2022 1239    BILITOT 1.2 01/04/2022 1239    ESTGFRAFRICA >60 01/04/2022 1239    EGFRNONAA >60 01/04/2022 1239          Lab Results   Component Value Date    CHOL 221 (H) 07/28/2017    CHOL 203 (H) 02/24/2017    CHOL 199 11/30/2015     Lab Results   Component Value Date    HDL 52 07/28/2017    HDL 53 02/24/2017    HDL 47 11/30/2015     Lab Results   Component Value Date    LDLCALC 138.2 07/28/2017    LDLCALC 121.0 02/24/2017    LDLCALC 113.4 11/30/2015     Lab Results   Component Value Date    TRIG 154 (H) 07/28/2017    TRIG 190 (H) 04/26/2017    TRIG 145 02/24/2017     Lab Results   Component Value Date    CHOLHDL 23.5 07/28/2017    CHOLHDL 26.1 02/24/2017    CHOLHDL 23.6 11/30/2015       Lab Results   Component Value Date    MICALBCREAT Unable to calculate 12/02/2021     Lab Results   Component Value Date    TSH 5.769 (H) 12/02/2021       CrCl cannot be calculated (Patient's most recent lab result is older than the maximum 7 days allowed.).    No results found for: [id number]      PHYSICAL EXAMINATION  Constitutional: Appears well, no distress  Neck: Supple, trachea midline.  Respiratory: CTA, even and unlabored.  Cardiovascular: RRR, no murmurs.   GI: active bowel sounds, no hernia  noted.  Skin: warm and dry.  Neuro: oriented to person, place, time  Feet: appropriate footwear.  Protective Sensation (w/ 10 gram monofilament):  Right: Intact  Left: Intact    Visual Inspection:  Normal -  Bilateral    Pedal Pulses:   Right: Present  Left: Present    Posterior tibialis:   Right:Present  Left: Present        PUMP/SENSOR DOWNLOAD: See media file for details. Fasting glucoses vary. Some nocturnal hypoglycemia noted. Hypoglycemia also noted after correction bolus and sometimes after meal bolus. Rebound hyperglycemia noted. Sporadic prandial excursions. Entering CHO and glucoses regularly.     Average TDD: 56.57u  Basal: 41%  Bolus: 59%    Control IQ Time in use: 15%    Average glucose: 180 mg/dL  Above 250 mg/dL: 20 %  181-250 mg/dL: 22 %   mg/dL: 52 %  54-69 mg/dL: 4 %  Below 54 mg/dL: 2 %       Goals      HEMOGLOBIN A1C < 7             Assessment/Plan  1. Type 1 diabetes mellitus with neuropathy -- Uncontrolled but appears to be improving. Having some hypoglycemia, which is complicating matters.   -- decrease midnight basal rate to 1.1 u/hr  -- change ISF to 40  -- change I:C ratio to 1:9 across the board.   -- Schedule eye exam and have report faxed  -- recommended alpha lipoic acid and B complex vitamins. If neuropathy symptoms persist, will add nightly gabapentin.     -- Discussed diagnosis of DM, A1c goals, progression of disease, long term complications and tx options.    -- Reviewed hypoglycemia management: treat with 4 oz of juice, 4 oz regular soda, or 4 glucose tablets. Monitor and repeat treatment every 15 minutes until BG is >70 Then have a snack, which includes 15 grams of complex carbohydrates and protein.   Advised patient to check BG before activities, such as driving or exercise.     2. Uncontrolled type 1 diabetes mellitus with gastroparesis -- optimize DM control   3. Cystic fibrosis  -- can negatively affect DM control   4. Hypothyroidism due to Hashimoto's thyroiditis  --  repeat TSH today.          FOLLOW UP  Follow up in about 3 months (around 7/11/2022).   Patient instructed to bring BG logs to each follow up   Patient encouraged to call for any BG/medication issues, concerns, or questions.      Orders Placed This Encounter   Procedures    Hemoglobin A1C    Comprehensive Metabolic Panel    TSH

## 2022-04-12 LAB — T4 FREE SERPL-MCNC: 0.76 NG/DL (ref 0.71–1.51)

## 2022-04-14 ENCOUNTER — TELEPHONE (OUTPATIENT)
Dept: ENDOCRINOLOGY | Facility: CLINIC | Age: 22
End: 2022-04-14
Payer: MEDICAID

## 2022-04-14 DIAGNOSIS — E03.9 HYPOTHYROIDISM, UNSPECIFIED TYPE: Primary | ICD-10-CM

## 2022-04-14 RX ORDER — LEVOTHYROXINE SODIUM 175 UG/1
TABLET ORAL
Qty: 30 TABLET | Refills: 6
Start: 2022-04-14 | End: 2023-09-21 | Stop reason: SDUPTHER

## 2022-04-22 ENCOUNTER — PATIENT MESSAGE (OUTPATIENT)
Dept: ENDOCRINOLOGY | Facility: CLINIC | Age: 22
End: 2022-04-22
Payer: MEDICAID

## 2022-04-22 DIAGNOSIS — E10.42 TYPE 1 DIABETES MELLITUS WITH DIABETIC POLYNEUROPATHY: Primary | ICD-10-CM

## 2022-04-22 RX ORDER — GABAPENTIN 100 MG/1
100 CAPSULE ORAL NIGHTLY
Qty: 30 CAPSULE | Refills: 6 | Status: SHIPPED | OUTPATIENT
Start: 2022-04-22 | End: 2022-04-27

## 2022-04-26 ENCOUNTER — PATIENT MESSAGE (OUTPATIENT)
Dept: ENDOCRINOLOGY | Facility: CLINIC | Age: 22
End: 2022-04-26
Payer: MEDICAID

## 2022-04-26 DIAGNOSIS — E10.42 TYPE 1 DIABETES MELLITUS WITH DIABETIC POLYNEUROPATHY: Primary | ICD-10-CM

## 2022-04-27 RX ORDER — GABAPENTIN 300 MG/1
CAPSULE ORAL
Qty: 60 CAPSULE | Refills: 6 | Status: SHIPPED | OUTPATIENT
Start: 2022-04-27 | End: 2023-01-10

## 2022-05-30 ENCOUNTER — PATIENT MESSAGE (OUTPATIENT)
Dept: PEDIATRICS | Facility: CLINIC | Age: 22
End: 2022-05-30

## 2022-05-30 ENCOUNTER — OFFICE VISIT (OUTPATIENT)
Dept: PEDIATRICS | Facility: CLINIC | Age: 22
End: 2022-05-30
Payer: COMMERCIAL

## 2022-05-30 VITALS
HEART RATE: 79 BPM | BODY MASS INDEX: 24.27 KG/M2 | DIASTOLIC BLOOD PRESSURE: 66 MMHG | WEIGHT: 145.81 LBS | TEMPERATURE: 99 F | RESPIRATION RATE: 17 BRPM | SYSTOLIC BLOOD PRESSURE: 113 MMHG

## 2022-05-30 DIAGNOSIS — S81.812A LACERATION OF LEFT LOWER EXTREMITY, INITIAL ENCOUNTER: Primary | ICD-10-CM

## 2022-05-30 PROCEDURE — 99999 PR PBB SHADOW E&M-EST. PATIENT-LVL IV: CPT | Mod: PBBFAC,,, | Performed by: PEDIATRICS

## 2022-05-30 PROCEDURE — 3008F PR BODY MASS INDEX (BMI) DOCUMENTED: ICD-10-PCS | Mod: CPTII,S$GLB,, | Performed by: PEDIATRICS

## 2022-05-30 PROCEDURE — 1160F PR REVIEW ALL MEDS BY PRESCRIBER/CLIN PHARMACIST DOCUMENTED: ICD-10-PCS | Mod: CPTII,S$GLB,, | Performed by: PEDIATRICS

## 2022-05-30 PROCEDURE — 3051F PR MOST RECENT HEMOGLOBIN A1C LEVEL 7.0 - < 8.0%: ICD-10-PCS | Mod: CPTII,S$GLB,, | Performed by: PEDIATRICS

## 2022-05-30 PROCEDURE — 3008F BODY MASS INDEX DOCD: CPT | Mod: CPTII,S$GLB,, | Performed by: PEDIATRICS

## 2022-05-30 PROCEDURE — 99213 PR OFFICE/OUTPT VISIT, EST, LEVL III, 20-29 MIN: ICD-10-PCS | Mod: S$GLB,,, | Performed by: PEDIATRICS

## 2022-05-30 PROCEDURE — 3051F HG A1C>EQUAL 7.0%<8.0%: CPT | Mod: CPTII,S$GLB,, | Performed by: PEDIATRICS

## 2022-05-30 PROCEDURE — 99213 OFFICE O/P EST LOW 20 MIN: CPT | Mod: S$GLB,,, | Performed by: PEDIATRICS

## 2022-05-30 PROCEDURE — 3078F DIAST BP <80 MM HG: CPT | Mod: CPTII,S$GLB,, | Performed by: PEDIATRICS

## 2022-05-30 PROCEDURE — 1159F MED LIST DOCD IN RCRD: CPT | Mod: CPTII,S$GLB,, | Performed by: PEDIATRICS

## 2022-05-30 PROCEDURE — 1159F PR MEDICATION LIST DOCUMENTED IN MEDICAL RECORD: ICD-10-PCS | Mod: CPTII,S$GLB,, | Performed by: PEDIATRICS

## 2022-05-30 PROCEDURE — 99999 PR PBB SHADOW E&M-EST. PATIENT-LVL IV: ICD-10-PCS | Mod: PBBFAC,,, | Performed by: PEDIATRICS

## 2022-05-30 PROCEDURE — 3078F PR MOST RECENT DIASTOLIC BLOOD PRESSURE < 80 MM HG: ICD-10-PCS | Mod: CPTII,S$GLB,, | Performed by: PEDIATRICS

## 2022-05-30 PROCEDURE — 3074F PR MOST RECENT SYSTOLIC BLOOD PRESSURE < 130 MM HG: ICD-10-PCS | Mod: CPTII,S$GLB,, | Performed by: PEDIATRICS

## 2022-05-30 PROCEDURE — 3074F SYST BP LT 130 MM HG: CPT | Mod: CPTII,S$GLB,, | Performed by: PEDIATRICS

## 2022-05-30 PROCEDURE — 1160F RVW MEDS BY RX/DR IN RCRD: CPT | Mod: CPTII,S$GLB,, | Performed by: PEDIATRICS

## 2022-06-01 ENCOUNTER — TELEPHONE (OUTPATIENT)
Dept: PEDIATRICS | Facility: CLINIC | Age: 22
End: 2022-06-01
Payer: MEDICAID

## 2022-06-01 NOTE — TELEPHONE ENCOUNTER
----- Message from Nelida Singh sent at 6/1/2022  8:53 AM CDT -----  Contact: r:  [phone]  Type:  Sooner Appointment Request    Caller is requesting a sooner appointment.  Caller declined first available appointment listed below.  Caller will not accept being placed on the waitlist and is requesting a message be sent to doctor.    Name of Caller:  Pts mother   When is the first available appointment?  NA  Symptoms:  Needs staples removed On June 8  Best Call Back Number:  [phone]    Additional Information:  Pts mother preferes afternoon appt if avail. Pts Pcp is not in office on wed.

## 2022-06-01 NOTE — TELEPHONE ENCOUNTER
Spoke w/mom regarding pt appt for staple removal; pt turns 22 on 6/8/22, when needing appt, however Dr. Lehman is not in clinic, cannot schedule appt with another provider d/t age - will need family medicine at 22yrs old. Informed mom of scheduling conflict, she will call [facility] to get pt est w/primary care. Informed mom if any issues, please call back. She gave VU.

## 2022-06-01 NOTE — TELEPHONE ENCOUNTER
----- Message from Nelida Singh sent at 6/1/2022  8:53 AM CDT -----  Contact: r:  [phone]  Type:  Sooner Appointment Request    Caller is requesting a sooner appointment.  Caller declined first available appointment listed below.  Caller will not accept being placed on the waitlist and is requesting a message be sent to doctor.    Name of Caller:  Pts mother   When is the first available appointment?  NA  Symptoms:  Needs staples removed On June 8  Best Call Back Number:  [phone]    Additional Information:  Pts mother preferes afternoon appt if avail. Pts Pcp is not in office on wed.

## 2022-10-03 ENCOUNTER — TELEPHONE (OUTPATIENT)
Dept: ENDOCRINOLOGY | Facility: CLINIC | Age: 22
End: 2022-10-03
Payer: COMMERCIAL

## 2022-10-03 ENCOUNTER — PATIENT MESSAGE (OUTPATIENT)
Dept: ENDOCRINOLOGY | Facility: CLINIC | Age: 22
End: 2022-10-03
Payer: COMMERCIAL

## 2022-10-05 ENCOUNTER — TELEPHONE (OUTPATIENT)
Dept: ENDOCRINOLOGY | Facility: CLINIC | Age: 22
End: 2022-10-05
Payer: COMMERCIAL

## 2022-10-20 ENCOUNTER — PATIENT MESSAGE (OUTPATIENT)
Dept: ENDOCRINOLOGY | Facility: CLINIC | Age: 22
End: 2022-10-20
Payer: COMMERCIAL

## 2022-11-03 ENCOUNTER — TELEPHONE (OUTPATIENT)
Dept: ENDOCRINOLOGY | Facility: CLINIC | Age: 22
End: 2022-11-03
Payer: COMMERCIAL

## 2022-11-10 ENCOUNTER — TELEPHONE (OUTPATIENT)
Dept: ENDOCRINOLOGY | Facility: CLINIC | Age: 22
End: 2022-11-10
Payer: COMMERCIAL

## 2022-11-10 NOTE — TELEPHONE ENCOUNTER
----- Message from Narciso Sheth sent at 11/10/2022  9:47 AM CST -----  Type: Needs Medical Advice  Who Called: Igor from [employer]  Symptoms (please be specific):  said he need a PA on pt insulin aspart U-100 (NOVOLOG U-100 INSULIN ASPART) 100 unit/mL injection--please call and advise  Best Call Back Number: [phone] ref # [id number]  Additional Information: thank you

## 2022-11-15 ENCOUNTER — PATIENT MESSAGE (OUTPATIENT)
Dept: ENDOCRINOLOGY | Facility: CLINIC | Age: 22
End: 2022-11-15
Payer: COMMERCIAL

## 2022-11-17 ENCOUNTER — PATIENT MESSAGE (OUTPATIENT)
Dept: ENDOCRINOLOGY | Facility: CLINIC | Age: 22
End: 2022-11-17
Payer: COMMERCIAL

## 2022-12-02 ENCOUNTER — OFFICE VISIT (OUTPATIENT)
Dept: ENDOCRINOLOGY | Facility: CLINIC | Age: 22
End: 2022-12-02
Payer: COMMERCIAL

## 2022-12-02 VITALS
SYSTOLIC BLOOD PRESSURE: 124 MMHG | WEIGHT: 159.31 LBS | OXYGEN SATURATION: 98 % | HEIGHT: 68 IN | BODY MASS INDEX: 24.14 KG/M2 | DIASTOLIC BLOOD PRESSURE: 64 MMHG | HEART RATE: 92 BPM

## 2022-12-02 DIAGNOSIS — Z46.81 INSULIN PUMP FITTING OR ADJUSTMENT: ICD-10-CM

## 2022-12-02 DIAGNOSIS — E03.9 HYPOTHYROIDISM, UNSPECIFIED TYPE: ICD-10-CM

## 2022-12-02 DIAGNOSIS — Z96.41 INSULIN PUMP STATUS: ICD-10-CM

## 2022-12-02 DIAGNOSIS — E10.42 TYPE 1 DIABETES MELLITUS WITH DIABETIC POLYNEUROPATHY: Primary | ICD-10-CM

## 2022-12-02 DIAGNOSIS — E84.9 CYSTIC FIBROSIS: ICD-10-CM

## 2022-12-02 DIAGNOSIS — Z14.1 CYSTIC FIBROSIS GENE CARRIER: ICD-10-CM

## 2022-12-02 DIAGNOSIS — K31.84 GASTROPARESIS: ICD-10-CM

## 2022-12-02 PROBLEM — E11.10 DIABETIC KETOACIDOSIS: Status: RESOLVED | Noted: 2021-08-25 | Resolved: 2022-12-02

## 2022-12-02 PROBLEM — R45.851 SUICIDAL IDEATION: Status: RESOLVED | Noted: 2018-01-10 | Resolved: 2022-12-02

## 2022-12-02 PROBLEM — A49.01 STAPH AUREUS INFECTION: Status: RESOLVED | Noted: 2017-08-09 | Resolved: 2022-12-02

## 2022-12-02 PROBLEM — R11.10 RECURRENT VOMITING: Status: RESOLVED | Noted: 2021-08-12 | Resolved: 2022-12-02

## 2022-12-02 PROBLEM — E11.10 DKA (DIABETIC KETOACIDOSIS): Status: RESOLVED | Noted: 2017-04-25 | Resolved: 2022-12-02

## 2022-12-02 PROBLEM — D72.829 LEUKOCYTOSIS: Status: RESOLVED | Noted: 2021-08-12 | Resolved: 2022-12-02

## 2022-12-02 PROBLEM — E87.20 LACTIC ACIDOSIS: Status: RESOLVED | Noted: 2021-08-13 | Resolved: 2022-12-02

## 2022-12-02 PROBLEM — E86.0 DEHYDRATION: Status: RESOLVED | Noted: 2021-08-12 | Resolved: 2022-12-02

## 2022-12-02 PROBLEM — R07.9 CHEST PAIN: Status: RESOLVED | Noted: 2018-09-09 | Resolved: 2022-12-02

## 2022-12-02 PROCEDURE — 99214 OFFICE O/P EST MOD 30 MIN: CPT | Mod: 25,S$GLB,, | Performed by: NURSE PRACTITIONER

## 2022-12-02 PROCEDURE — 3078F DIAST BP <80 MM HG: CPT | Mod: CPTII,S$GLB,, | Performed by: NURSE PRACTITIONER

## 2022-12-02 PROCEDURE — 99999 PR PBB SHADOW E&M-EST. PATIENT-LVL III: CPT | Mod: PBBFAC,,, | Performed by: NURSE PRACTITIONER

## 2022-12-02 PROCEDURE — 3078F PR MOST RECENT DIASTOLIC BLOOD PRESSURE < 80 MM HG: ICD-10-PCS | Mod: CPTII,S$GLB,, | Performed by: NURSE PRACTITIONER

## 2022-12-02 PROCEDURE — 3046F HEMOGLOBIN A1C LEVEL >9.0%: CPT | Mod: CPTII,S$GLB,, | Performed by: NURSE PRACTITIONER

## 2022-12-02 PROCEDURE — 3074F PR MOST RECENT SYSTOLIC BLOOD PRESSURE < 130 MM HG: ICD-10-PCS | Mod: CPTII,S$GLB,, | Performed by: NURSE PRACTITIONER

## 2022-12-02 PROCEDURE — 3046F PR MOST RECENT HEMOGLOBIN A1C LEVEL > 9.0%: ICD-10-PCS | Mod: CPTII,S$GLB,, | Performed by: NURSE PRACTITIONER

## 2022-12-02 PROCEDURE — 95251 CONT GLUC MNTR ANALYSIS I&R: CPT | Mod: S$GLB,,, | Performed by: NURSE PRACTITIONER

## 2022-12-02 PROCEDURE — 99214 PR OFFICE/OUTPT VISIT, EST, LEVL IV, 30-39 MIN: ICD-10-PCS | Mod: 25,S$GLB,, | Performed by: NURSE PRACTITIONER

## 2022-12-02 PROCEDURE — 95251 PR GLUCOSE MONITOR, 72 HOUR, PHYS INTERP: ICD-10-PCS | Mod: S$GLB,,, | Performed by: NURSE PRACTITIONER

## 2022-12-02 PROCEDURE — 99999 PR PBB SHADOW E&M-EST. PATIENT-LVL III: ICD-10-PCS | Mod: PBBFAC,,, | Performed by: NURSE PRACTITIONER

## 2022-12-02 PROCEDURE — 3074F SYST BP LT 130 MM HG: CPT | Mod: CPTII,S$GLB,, | Performed by: NURSE PRACTITIONER

## 2022-12-02 PROCEDURE — 3008F PR BODY MASS INDEX (BMI) DOCUMENTED: ICD-10-PCS | Mod: CPTII,S$GLB,, | Performed by: NURSE PRACTITIONER

## 2022-12-02 PROCEDURE — 3008F BODY MASS INDEX DOCD: CPT | Mod: CPTII,S$GLB,, | Performed by: NURSE PRACTITIONER

## 2022-12-02 RX ORDER — INSULIN ASPART 100 [IU]/ML
INJECTION, SOLUTION INTRAVENOUS; SUBCUTANEOUS
Qty: 20 ML | Refills: 12 | Status: SHIPPED | OUTPATIENT
Start: 2022-12-02 | End: 2023-08-10

## 2022-12-02 NOTE — PROGRESS NOTES
Subjective:       Patient ID: Zoran Corado is a 22 y.o. male.    Chief Complaint: Insulin Pump Therapy, Diabetes, and Hypothyroidism    HPI Pt is a 22 y.o. wm  with a diagnosis of Type 1 diabetes mellitus  (2007 age 7 years). iagnosed approximately hypothyroidism, as well as chronic conditions pending review including CF, gastroparesis.   Pt initially dx with Type 1 DM at age 7 years, and CF  (2015). later on. Has used insulin pump but interfered with job.  He has frequent and recurrent hospitalizations for DKA and  Other pertinent medical and social information noted includes, but not limited to: Works in [employer].     Interim Events: Pt new to me--needed Friday appt, other provider no longer available.  Pleasant, good historian.  He changed jobs and restarted pump.  Date is off.  No focal complaints with exception of bloating r/t gastroparesis.   He is/was being worked up for gastric pacemaker at [facility].  He did have a spell in last couple of weeks sensor delayed in shipment so no data available. He has had frequent DKA--partially r/t gastroparesis, appears a lot was related to cannabinoid hyperemesis syndrome, and a couple of times missed insulin when not on pump.  He is has since stopped marijuana with job, and that has been beneficial.  He is also missing levo, and when takes, takes with other meds.       Sensor Interpretation   Dates of review: 11/19--12/1/2022  (date on download wrong r/t being wrong in pump)    Estimated A1C;na    Percent of sensor utilization during review period:variable     NA % Very high  68 % High  31% Time in range  1% Low  % Very Low    Time in range parameters:   mg/dL     Prominent Theme/Finding:  Often not bolusing for meals, but microoluses are often kicking in, and likely working more as a square wave with gastroparesis, as no hypoglycemia note,  Do note some marked glucose spikes randomly.     Glucometer required to optimize pump function:na     Recommended back up  plan in event of insulin pump hiatus:  25 units of basal,   5-7 units with meals--probably better immeidately after plus mod dose ss  (150-200=+1),     Mr. Zoran Corado was diagnosed with Type 1  DM at age 7. This was prior to his CF diagnosis. Denies FH of DM. Past hospitalizations due to DKA, most recently in February 2021 when he missed two doses of insulin. He previously used Tandem T-slim pump with Dexcom. He hasn't used pump since ~2019. He didn't like wearing a device, as it interfered with his physical job.  His other medical conditions include hypothyroidism and cystic fibrosis (dx in 2015). He reports previous episodes of pancreatitis.   Previously seen in endocrine by Dr. Sandifer in June 2020.    Review of Systems   Constitutional:  Negative for appetite change and unexpected weight change.   HENT:  Negative for hearing loss and trouble swallowing.    Eyes:  Negative for photophobia and visual disturbance.   Respiratory:  Negative for cough and shortness of breath.    Cardiovascular:  Negative for chest pain and leg swelling.   Gastrointestinal:  Positive for abdominal distention and abdominal pain. Negative for constipation and diarrhea.   Genitourinary:  Negative for difficulty urinating and urgency.   Musculoskeletal:  Negative for arthralgias and myalgias.   Neurological:  Negative for weakness and numbness.   Psychiatric/Behavioral:  Negative for sleep disturbance. The patient is not nervous/anxious.        Objective:      Physical Exam  Constitutional:       Appearance: Normal appearance.   HENT:      Head: Normocephalic and atraumatic.      Nose: Nose normal.      Mouth/Throat:      Mouth: Mucous membranes are moist.      Pharynx: Oropharynx is clear.   Eyes:      Extraocular Movements: Extraocular movements intact.      Conjunctiva/sclera: Conjunctivae normal.      Pupils: Pupils are equal, round, and reactive to light.   Neck:      Vascular: No carotid bruit.   Cardiovascular:      Rate and  "Rhythm: Normal rate and regular rhythm.      Pulses: Normal pulses.      Heart sounds: Normal heart sounds.   Pulmonary:      Effort: Pulmonary effort is normal.      Breath sounds: Normal breath sounds.   Musculoskeletal:         General: Normal range of motion.      Cervical back: Normal range of motion and neck supple.      Right lower leg: No edema.      Left lower leg: No edema.      Comments: Feet: no open wounds or calluses. GOod pedal care. Pedal pulses +2 bilaterally   Sensation via monofilament intact.    Lymphadenopathy:      Cervical: No cervical adenopathy.   Skin:     General: Skin is warm and dry.   Neurological:      General: No focal deficit present.      Mental Status: He is alert and oriented to person, place, and time.   Psychiatric:         Mood and Affect: Mood normal.         Behavior: Behavior normal.         Thought Content: Thought content normal.         Judgment: Judgment normal.           Ht 5' 8" (1.727 m)   Wt 72.3 kg (159 lb 4.5 oz)   BMI 24.22 kg/m²     Hemoglobin A1C   Date Value Ref Range Status   07/08/2022 9.1 (H) 0.0 - 5.6 % Final     Comment:     Reference Interval:  5.0 - 5.6 Normal   5.7 - 6.4 High Risk   > 6.5 Diabetic      Hgb A1c results are standardized based on the (NGSP) National   Glycohemoglobin Standardization Program.      Hemoglobin A1C levels are related to mean serum/plasma glucose   during the preceding 2-3 months.        04/11/2022 7.1 (H) 4.0 - 5.6 % Final     Comment:     ADA Screening Guidelines:  5.7-6.4%  Consistent with prediabetes  >or=6.5%  Consistent with diabetes    High levels of fetal hemoglobin interfere with the HbA1C  assay. Heterozygous hemoglobin variants (HbS, HgC, etc)do  not significantly interfere with this assay.   However, presence of multiple variants may affect accuracy.     12/02/2021 8.6 (H) 4.0 - 5.6 % Final     Comment:     ADA Screening Guidelines:  5.7-6.4%  Consistent with prediabetes  >or=6.5%  Consistent with diabetes    High " levels of fetal hemoglobin interfere with the HbA1C  assay. Heterozygous hemoglobin variants (HbS, HgC, etc)do  not significantly interfere with this assay.   However, presence of multiple variants may affect accuracy.         Chemistry        Component Value Date/Time     (L) 09/09/2022 1919    K 3.6 09/09/2022 1919    CL 91 (L) 09/09/2022 1919    CO2 30 09/09/2022 1919    BUN 19 09/09/2022 1919    CREATININE 0.86 09/09/2022 1919     (H) 09/09/2022 1919    GLU >500 09/09/2018 0433        Component Value Date/Time    CALCIUM 9.5 09/09/2022 1919    ALKPHOS 76 09/09/2022 1919    AST 26 09/09/2022 1919    AST 30 04/12/2016 1113    ALT 18 09/09/2022 1919    BILITOT 1.1 09/09/2022 1919    ESTGFRAFRICA >60 07/08/2022 1342    EGFRNONAA >60 07/08/2022 1342          Lab Results   Component Value Date    CHOL 221 (H) 07/28/2017     Lab Results   Component Value Date    HDL 52 07/28/2017     Lab Results   Component Value Date    LDLCALC 138.2 07/28/2017     Lab Results   Component Value Date    TRIG 154 (H) 07/28/2017     Lab Results   Component Value Date    CHOLHDL 23.5 07/28/2017     Lab Results   Component Value Date    MICALBCREAT Unable to calculate 12/02/2021     Lab Results   Component Value Date    TSH 6.777 (H) 04/11/2022     No results found for: [id number]    Assessment:       1. Type 1 diabetes mellitus with diabetic polyneuropathy  Comprehensive Metabolic Panel    Hemoglobin A1C    Lipid Panel    Microalbumin/Creatinine Ratio, Urine    TSH      2. Hypothyroidism, unspecified type        3. Gastroparesis        4. Cystic fibrosis gene carrier        5. Cystic fibrosis        6. Insulin pump fitting or adjustment        7. Insulin pump status                Plan:     Reinforced levo--qam--empty stomach, water only ideally an hour before eating. On levo 1.75    Would likely do better bolusing after meals--if he remembers.       ORDERS 12/02/2022     3 mo with fasting cmp, lipids, a1c, tsh, urine mc/  prior---Fridays only. --Pt will make on portal

## 2022-12-06 ENCOUNTER — PATIENT MESSAGE (OUTPATIENT)
Dept: ENDOCRINOLOGY | Facility: CLINIC | Age: 22
End: 2022-12-06
Payer: COMMERCIAL

## 2022-12-07 ENCOUNTER — PATIENT MESSAGE (OUTPATIENT)
Dept: ENDOCRINOLOGY | Facility: CLINIC | Age: 22
End: 2022-12-07
Payer: COMMERCIAL

## 2022-12-07 ENCOUNTER — TELEPHONE (OUTPATIENT)
Dept: ENDOCRINOLOGY | Facility: CLINIC | Age: 22
End: 2022-12-07
Payer: COMMERCIAL

## 2022-12-07 DIAGNOSIS — E10.42 TYPE 1 DIABETES MELLITUS WITH DIABETIC POLYNEUROPATHY: Primary | ICD-10-CM

## 2022-12-07 RX ORDER — URINE ACETONE TEST STRIPS
STRIP MISCELLANEOUS
Qty: 50 STRIP | Status: SHIPPED | OUTPATIENT
Start: 2022-12-07 | End: 2023-01-11

## 2022-12-07 NOTE — TELEPHONE ENCOUNTER
Mother called.  Pt to ER for hyperglycemia and dx of flu.  Pt new to me.  Seen once. Not sure what his true insulin needs are.  Advised to take pump out of control IQ.   Put on Temp basal at 120%, and change IOB to 3 hrs.  Can cancel and return to regular profile once glucoses start improving.    If glucoses do not improved after bolusing, do manual injection and change infusion set.      Verbalizes understanding.     Pt was not in DKA.

## 2022-12-08 ENCOUNTER — PATIENT MESSAGE (OUTPATIENT)
Dept: ENDOCRINOLOGY | Facility: CLINIC | Age: 22
End: 2022-12-08
Payer: COMMERCIAL

## 2022-12-11 ENCOUNTER — NURSE TRIAGE (OUTPATIENT)
Dept: ADMINISTRATIVE | Facility: CLINIC | Age: 22
End: 2022-12-11
Payer: COMMERCIAL

## 2022-12-11 DIAGNOSIS — E10.9 TYPE 1 DIABETES MELLITUS WITHOUT COMPLICATION: Primary | ICD-10-CM

## 2022-12-11 DIAGNOSIS — E10.65 TYPE 1 DIABETES MELLITUS WITH HYPERGLYCEMIA: ICD-10-CM

## 2022-12-11 RX ORDER — INSULIN ASPART 100 [IU]/ML
INJECTION, SOLUTION INTRAVENOUS; SUBCUTANEOUS
Qty: 20 ML | Refills: 0 | Status: SHIPPED | OUTPATIENT
Start: 2022-12-11 | End: 2022-12-30

## 2022-12-11 NOTE — TELEPHONE ENCOUNTER
Patient is out of his insulin, he has RX at regular pharmacy but they are closed today. Would like to see if an RX can be called into [facility]", [address] [facility].      Report to on call, Dr. López, he will send in RX.     Patient called back and notified.   Reason for Disposition   [1] Prescription refill request for ESSENTIAL medicine (i.e., likelihood of harm to patient if not taken) AND [2] triager unable to refill per department policy    Protocols used: Medication Refill and Renewal Call-A-

## 2022-12-11 NOTE — TELEPHONE ENCOUNTER
Patient spoke with a nurse earlier in regards to his insulin being refilled.  Note states Dr. López will send prescription. Verified with Poonam at [facility] that prescription was not called in.  Attempted to reach Dr. López via phone x 3 no answer.  Call was placed twice initially by  as contact information could not be found.  Called again spoke with Dr. López who states it is okay to call in NOVOLOG U-100 INSULIN ASPART 100 unit/mL injection, Sig: Pt uses 65 units a day via insulin pump---this is 1.95 vials--dispense 2 vials, no refills.  Prescription called in I spoke with Kaela (pharmacist) at [facility].  She states prescription will be ready in 15 minutes and she will list it as a wait.  Patient notified a he has no further questions at this time.  Advised patient to call back if further assistance is needed.  Patient verbalized understanding.   Reason for Disposition   [1] Prescription refill request for ESSENTIAL medicine (i.e., likelihood of harm to patient if not taken) AND [2] triager unable to refill per department policy    Protocols used: Medication Refill and Renewal Call-A-

## 2022-12-12 RX ORDER — INSULIN GLARGINE 100 [IU]/ML
INJECTION, SOLUTION SUBCUTANEOUS
Qty: 15 ML | Refills: 2 | Status: SHIPPED | OUTPATIENT
Start: 2022-12-12 | End: 2023-01-11

## 2022-12-28 ENCOUNTER — PATIENT MESSAGE (OUTPATIENT)
Dept: FAMILY MEDICINE | Facility: CLINIC | Age: 22
End: 2022-12-28
Payer: COMMERCIAL

## 2023-01-05 ENCOUNTER — TELEPHONE (OUTPATIENT)
Dept: FAMILY MEDICINE | Facility: CLINIC | Age: 23
End: 2023-01-05
Payer: COMMERCIAL

## 2023-01-05 NOTE — TELEPHONE ENCOUNTER
----- Message from Rubén Becker sent at 1/5/2023 11:32 AM CST -----  Contact: [phone]  Pt is calling regarding paper work/appt . Please call back at [phone]. Thanks/morena

## 2023-01-07 ENCOUNTER — PATIENT MESSAGE (OUTPATIENT)
Dept: ENDOCRINOLOGY | Facility: CLINIC | Age: 23
End: 2023-01-07
Payer: COMMERCIAL

## 2023-01-10 ENCOUNTER — TELEPHONE (OUTPATIENT)
Dept: FAMILY MEDICINE | Facility: CLINIC | Age: 23
End: 2023-01-10
Payer: COMMERCIAL

## 2023-01-10 ENCOUNTER — TELEPHONE (OUTPATIENT)
Dept: ENDOCRINOLOGY | Facility: CLINIC | Age: 23
End: 2023-01-10
Payer: COMMERCIAL

## 2023-01-10 ENCOUNTER — OFFICE VISIT (OUTPATIENT)
Dept: FAMILY MEDICINE | Facility: CLINIC | Age: 23
End: 2023-01-10
Payer: COMMERCIAL

## 2023-01-10 ENCOUNTER — HOSPITAL ENCOUNTER (OUTPATIENT)
Dept: RADIOLOGY | Facility: HOSPITAL | Age: 23
Discharge: HOME | End: 2023-01-10
Attending: FAMILY MEDICINE
Payer: COMMERCIAL

## 2023-01-10 VITALS
DIASTOLIC BLOOD PRESSURE: 78 MMHG | OXYGEN SATURATION: 99 % | HEIGHT: 68 IN | HEART RATE: 108 BPM | BODY MASS INDEX: 21.98 KG/M2 | SYSTOLIC BLOOD PRESSURE: 120 MMHG | WEIGHT: 145 LBS

## 2023-01-10 DIAGNOSIS — M25.512 ACUTE PAIN OF LEFT SHOULDER: ICD-10-CM

## 2023-01-10 DIAGNOSIS — M25.552 LEFT HIP PAIN: ICD-10-CM

## 2023-01-10 DIAGNOSIS — Z76.89 ESTABLISHING CARE WITH NEW DOCTOR, ENCOUNTER FOR: Primary | ICD-10-CM
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
## 2023-01-10 DIAGNOSIS — K31.84 GASTROPARESIS: Primary | ICD-10-CM

## 2023-01-10 DIAGNOSIS — K31.84 GASTROPARESIS: ICD-10-CM

## 2023-01-10 PROCEDURE — 73030 X-RAY EXAM OF SHOULDER: CPT | Mod: 26,LT,, | Performed by: RADIOLOGY

## 2023-01-10 PROCEDURE — 1159F PR MEDICATION LIST DOCUMENTED IN MEDICAL RECORD: ICD-10-PCS | Mod: CPTII,S$GLB,, | Performed by: FAMILY MEDICINE

## 2023-01-10 PROCEDURE — 3074F PR MOST RECENT SYSTOLIC BLOOD PRESSURE < 130 MM HG: ICD-10-PCS | Mod: CPTII,S$GLB,, | Performed by: FAMILY MEDICINE

## 2023-01-10 PROCEDURE — 1159F MED LIST DOCD IN RCRD: CPT | Mod: CPTII,S$GLB,, | Performed by: FAMILY MEDICINE

## 2023-01-10 PROCEDURE — 3074F SYST BP LT 130 MM HG: CPT | Mod: CPTII,S$GLB,, | Performed by: FAMILY MEDICINE

## 2023-01-10 PROCEDURE — 3078F DIAST BP <80 MM HG: CPT | Mod: CPTII,S$GLB,, | Performed by: FAMILY MEDICINE

## 2023-01-10 PROCEDURE — 1160F RVW MEDS BY RX/DR IN RCRD: CPT | Mod: CPTII,S$GLB,, | Performed by: FAMILY MEDICINE

## 2023-01-10 PROCEDURE — 73030 X-RAY EXAM OF SHOULDER: CPT | Mod: TC,PO,LT

## 2023-01-10 PROCEDURE — 73030 XR SHOULDER COMPLETE 2 OR MORE VIEWS RIGHT: ICD-10-PCS | Mod: 26,RT,, | Performed by: RADIOLOGY

## 2023-01-10 PROCEDURE — 73030 X-RAY EXAM OF SHOULDER: CPT | Mod: TC,PO,RT

## 2023-01-10 PROCEDURE — 99204 PR OFFICE/OUTPT VISIT, NEW, LEVL IV, 45-59 MIN: ICD-10-PCS | Mod: S$GLB,,, | Performed by: FAMILY MEDICINE

## 2023-01-10 PROCEDURE — 99204 OFFICE O/P NEW MOD 45 MIN: CPT | Mod: S$GLB,,, | Performed by: FAMILY MEDICINE

## 2023-01-10 PROCEDURE — 73030 X-RAY EXAM OF SHOULDER: CPT | Mod: 26,RT,, | Performed by: RADIOLOGY

## 2023-01-10 PROCEDURE — 99999 PR PBB SHADOW E&M-EST. PATIENT-LVL V: ICD-10-PCS | Mod: PBBFAC,,, | Performed by: FAMILY MEDICINE

## 2023-01-10 PROCEDURE — 3008F BODY MASS INDEX DOCD: CPT | Mod: CPTII,S$GLB,, | Performed by: FAMILY MEDICINE

## 2023-01-10 PROCEDURE — 1160F PR REVIEW ALL MEDS BY PRESCRIBER/CLIN PHARMACIST DOCUMENTED: ICD-10-PCS | Mod: CPTII,S$GLB,, | Performed by: FAMILY MEDICINE

## 2023-01-10 PROCEDURE — 99999 PR PBB SHADOW E&M-EST. PATIENT-LVL V: CPT | Mod: PBBFAC,,, | Performed by: FAMILY MEDICINE

## 2023-01-10 PROCEDURE — 3078F PR MOST RECENT DIASTOLIC BLOOD PRESSURE < 80 MM HG: ICD-10-PCS | Mod: CPTII,S$GLB,, | Performed by: FAMILY MEDICINE

## 2023-01-10 PROCEDURE — 3008F PR BODY MASS INDEX (BMI) DOCUMENTED: ICD-10-PCS | Mod: CPTII,S$GLB,, | Performed by: FAMILY MEDICINE

## 2023-01-10 RX ORDER — PROCHLORPERAZINE 25 MG
25 SUPPOSITORY, RECTAL RECTAL EVERY 12 HOURS PRN
Qty: 10 SUPPOSITORY | Refills: 0 | Status: SHIPPED | OUTPATIENT
Start: 2023-01-10 | End: 2023-02-09 | Stop reason: SDUPTHER

## 2023-01-10 RX ORDER — IBUPROFEN 800 MG/1
800 TABLET ORAL EVERY 8 HOURS PRN
Qty: 30 TABLET | Refills: 0 | Status: SHIPPED | OUTPATIENT
Start: 2023-01-10 | End: 2023-01-20

## 2023-01-10 NOTE — PROGRESS NOTES
++++++++++++CALL patient with results and Document verification.  Schedule follow-up if needed.  [phone]  X-ray of the left shoulder reviewed by radiology.  There is an area that could be a fracture noted.  Radiology is requesting further imaging with MRI.  MRI has been ordered.  I recommend that you get a sling and keep the shoulder immobilized until further notice.  I have also placed a referral to orthopedics.  Please follow-up with them   As soon as possible.

## 2023-01-10 NOTE — TELEPHONE ENCOUNTER
----- Message from MAJOR Rodriguez,ANP-C sent at 1/10/2023  3:18 PM CST -----  I put in referral for gastro and also gastic empytying study.  Please make sure arranged.

## 2023-01-10 NOTE — TELEPHONE ENCOUNTER
----- Message from Nico Leonard MD sent at 1/10/2023 10:15 AM CST -----  ++++++++++++CALL patient with results and Document verification.  Schedule follow-up if needed.  [phone]  X-ray of the left shoulder reviewed by radiology.  There is an area that could be a fracture noted.  Radiology is requesting further imaging with MRI.  MRI has been ordered.  I recommend that you get a sling and keep the shoulder immobilized until further notice.  I have also placed a referral to orthopedics.  Please follow-up with them   As soon as possible.

## 2023-01-10 NOTE — PATIENT INSTRUCTIONS
Follow up in about 4 weeks (around 2/7/2023), or if symptoms worsen or fail to improve, for follow up shoulder injury.     Dear patient,   As a result of recent federal legislation (The Federal Cures Act), you may receive lab or pathology results from your visit in your [facility] account before your physician is able to contact you. Your physician or their representative will relay the results to you with their recommendations at their soonest availability.     If no improvement in symptoms or symptoms worsen, please be advised to call MD, follow-up at clinic and/or go to ER if becomes severe.    Nico Leonard M.D.        We Offer TELEHEALTH & Same Day Appointments!   Book your Telehealth appointment with me through my nurse or   Clinic appointments on [employer]!    [address] [place] [address]    Office: [phone]   FAX: [phone]    Check out my Facebook Page and Follow Me at: [email or web address]    Check out my website at [employer] by clicking on: [email or web address]    To Schedule appointments online, go to [employer]: [email or web address]

## 2023-01-10 NOTE — TELEPHONE ENCOUNTER
S/W pt and mother, GI appt scheduled.  Awaiting response from Rosy in [facility] to sched gastric emptying scan.
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
## 2023-01-11 ENCOUNTER — OFFICE VISIT (OUTPATIENT)
Dept: ORTHOPEDICS | Facility: CLINIC | Age: 23
End: 2023-01-11
Payer: COMMERCIAL

## 2023-01-11 ENCOUNTER — TELEPHONE (OUTPATIENT)
Dept: FAMILY MEDICINE | Facility: CLINIC | Age: 23
End: 2023-01-11
Payer: COMMERCIAL

## 2023-01-11 VITALS — WEIGHT: 145 LBS | BODY MASS INDEX: 21.98 KG/M2 | HEIGHT: 68 IN

## 2023-01-11 DIAGNOSIS — M25.512 ACUTE PAIN OF LEFT SHOULDER: ICD-10-CM

## 2023-01-11 DIAGNOSIS — S42.292A OTHER CLOSED DISPLACED FRACTURE OF PROXIMAL END OF LEFT HUMERUS, INITIAL ENCOUNTER: Primary | ICD-10-CM

## 2023-01-11 PROBLEM — K31.84 GASTROPARESIS: Chronic | Status: ACTIVE | Noted: 2021-08-12

## 2023-01-11 PROBLEM — F41.9 ANXIETY: Status: ACTIVE | Noted: 2023-01-11

## 2023-01-11 PROBLEM — T79.7XXA SUBCUTANEOUS EMPHYSEMA: Status: ACTIVE | Noted: 2021-03-20

## 2023-01-11 PROBLEM — J93.9 PNEUMOTHORAX ON RIGHT: Status: ACTIVE | Noted: 2021-03-21

## 2023-01-11 PROBLEM — S42.202A CLOSED FRACTURE OF LEFT PROXIMAL HUMERUS: Status: ACTIVE | Noted: 2023-01-11

## 2023-01-11 PROCEDURE — 1160F RVW MEDS BY RX/DR IN RCRD: CPT | Mod: CPTII,S$GLB,, | Performed by: NURSE PRACTITIONER

## 2023-01-11 PROCEDURE — 1159F MED LIST DOCD IN RCRD: CPT | Mod: CPTII,S$GLB,, | Performed by: NURSE PRACTITIONER

## 2023-01-11 PROCEDURE — 99204 OFFICE O/P NEW MOD 45 MIN: CPT | Mod: S$GLB,,, | Performed by: NURSE PRACTITIONER

## 2023-01-11 PROCEDURE — 1159F PR MEDICATION LIST DOCUMENTED IN MEDICAL RECORD: ICD-10-PCS | Mod: CPTII,S$GLB,, | Performed by: NURSE PRACTITIONER

## 2023-01-11 PROCEDURE — 99204 PR OFFICE/OUTPT VISIT, NEW, LEVL IV, 45-59 MIN: ICD-10-PCS | Mod: S$GLB,,, | Performed by: NURSE PRACTITIONER

## 2023-01-11 PROCEDURE — 3008F BODY MASS INDEX DOCD: CPT | Mod: CPTII,S$GLB,, | Performed by: NURSE PRACTITIONER

## 2023-01-11 PROCEDURE — 3008F PR BODY MASS INDEX (BMI) DOCUMENTED: ICD-10-PCS | Mod: CPTII,S$GLB,, | Performed by: NURSE PRACTITIONER

## 2023-01-11 PROCEDURE — 99999 PR PBB SHADOW E&M-EST. PATIENT-LVL IV: CPT | Mod: PBBFAC,,, | Performed by: NURSE PRACTITIONER

## 2023-01-11 PROCEDURE — 1160F PR REVIEW ALL MEDS BY PRESCRIBER/CLIN PHARMACIST DOCUMENTED: ICD-10-PCS | Mod: CPTII,S$GLB,, | Performed by: NURSE PRACTITIONER

## 2023-01-11 PROCEDURE — 99999 PR PBB SHADOW E&M-EST. PATIENT-LVL IV: ICD-10-PCS | Mod: PBBFAC,,, | Performed by: NURSE PRACTITIONER

## 2023-01-11 RX ORDER — TRAMADOL HYDROCHLORIDE 50 MG/1
50 TABLET ORAL EVERY 6 HOURS PRN
Qty: 28 TABLET | Refills: 0 | Status: SHIPPED | OUTPATIENT
Start: 2023-01-11 | End: 2023-01-18

## 2023-01-11 RX ORDER — METHOCARBAMOL 750 MG/1
1500 TABLET, FILM COATED ORAL 3 TIMES DAILY
Qty: 60 TABLET | Refills: 3 | Status: SHIPPED | OUTPATIENT
Start: 2023-01-11 | End: 2023-02-20
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
## 2023-01-11 NOTE — PATIENT INSTRUCTIONS
Pendulum shoulder exercises:   Start exercise with your affected arm.   1. Lean over with your good arm supported on a table or chair.    2. Relax the arm on the painful side, letting it hang straight down.    3. Slowly start to swing the relaxed arm by moving your body. Move it in a [place], then reverse the direction. Next,         move the arm backward and forward. Lastly, move it side to side. Do each exercise approximately 30 seconds each.   4. Let gravity gently sway your arm. Do not actively lift or move it with your shoulder muscles.   5. Do the exercise 5-6 times daily for 5 -10 minutes each time. Change the direction of your movement after 1 minute of motion.

## 2023-01-11 NOTE — TELEPHONE ENCOUNTER
----- Message from Nico Leonard MD sent at 1/11/2023  6:17 AM CST -----  Patient needs to follow-up with me in four weeks for work clearance

## 2023-01-11 NOTE — ASSESSMENT & PLAN NOTE
Referral to gastro placed per patient request.  Refill on Compazine until patient can follow-up with them.  Follow-up closely with Endocrinology for diabetes control.

## 2023-01-11 NOTE — ASSESSMENT & PLAN NOTE
Check x-rays of the shoulders.  Continue with anti-inflammatory.  Referral to orthopedic if needed.  Proceed with physical therapy if tolerated.  Patient can return to work with light duty no overhead lifting.

## 2023-01-11 NOTE — ASSESSMENT & PLAN NOTE
I have ordered x-ray of the shoulders.  Continue with ibuprofen.  I recommend physical therapy if no improvement.  Patient may be able to return to work depending on workup and progress.  He should be on light duty and no overhead lifting for now.    Follow-up sooner if no improvement.

## 2023-01-13 ENCOUNTER — TELEPHONE (OUTPATIENT)
Dept: ENDOCRINOLOGY | Facility: CLINIC | Age: 23
End: 2023-01-13
Payer: COMMERCIAL

## 2023-01-18 DIAGNOSIS — S42.292A OTHER CLOSED DISPLACED FRACTURE OF PROXIMAL END OF LEFT HUMERUS, INITIAL ENCOUNTER: Primary | ICD-10-CM

## 2023-01-19 ENCOUNTER — PATIENT MESSAGE (OUTPATIENT)
Dept: ORTHOPEDICS | Facility: CLINIC | Age: 23
End: 2023-01-19
Payer: COMMERCIAL

## 2023-01-19 ENCOUNTER — PATIENT MESSAGE (OUTPATIENT)
Dept: ENDOCRINOLOGY | Facility: CLINIC | Age: 23
End: 2023-01-19
Payer: COMMERCIAL

## 2023-01-23 DIAGNOSIS — S42.292A OTHER CLOSED DISPLACED FRACTURE OF PROXIMAL END OF LEFT HUMERUS, INITIAL ENCOUNTER: Primary | ICD-10-CM

## 2023-01-24 ENCOUNTER — OFFICE VISIT (OUTPATIENT)
Dept: ORTHOPEDICS | Facility: CLINIC | Age: 23
End: 2023-01-24
Payer: COMMERCIAL

## 2023-01-24 ENCOUNTER — TELEPHONE (OUTPATIENT)
Dept: GASTROENTEROLOGY | Facility: CLINIC | Age: 23
End: 2023-01-24
Payer: COMMERCIAL

## 2023-01-24 ENCOUNTER — HOSPITAL ENCOUNTER (OUTPATIENT)
Dept: RADIOLOGY | Facility: HOSPITAL | Age: 23
Discharge: HOME | End: 2023-01-24
Attending: NURSE PRACTITIONER
Payer: COMMERCIAL

## 2023-01-24 ENCOUNTER — OFFICE VISIT (OUTPATIENT)
Dept: GASTROENTEROLOGY | Facility: CLINIC | Age: 23
End: 2023-01-24
Payer: COMMERCIAL

## 2023-01-24 VITALS — BODY MASS INDEX: 22.55 KG/M2 | WEIGHT: 148.81 LBS | HEIGHT: 68 IN

## 2023-01-24 VITALS — WEIGHT: 145.06 LBS | HEIGHT: 68 IN | BODY MASS INDEX: 21.99 KG/M2

## 2023-01-24 DIAGNOSIS — K31.84 GASTROPARESIS: ICD-10-CM

## 2023-01-24 DIAGNOSIS — R10.13 EPIGASTRIC PAIN: ICD-10-CM

## 2023-01-24 DIAGNOSIS — R11.2 NAUSEA AND VOMITING, UNSPECIFIED VOMITING TYPE: Primary | ICD-10-CM

## 2023-01-24 DIAGNOSIS — S42.292A OTHER CLOSED DISPLACED FRACTURE OF PROXIMAL END OF LEFT HUMERUS, INITIAL ENCOUNTER: ICD-10-CM

## 2023-01-24 DIAGNOSIS — S42.292A OTHER CLOSED DISPLACED FRACTURE OF PROXIMAL END OF LEFT HUMERUS, INITIAL ENCOUNTER: Primary | ICD-10-CM

## 2023-01-24 DIAGNOSIS — R12 HEARTBURN: ICD-10-CM

## 2023-01-24 PROCEDURE — 3008F BODY MASS INDEX DOCD: CPT | Mod: CPTII,S$GLB,, | Performed by: NURSE PRACTITIONER

## 2023-01-24 PROCEDURE — 1159F MED LIST DOCD IN RCRD: CPT | Mod: CPTII,S$GLB,, | Performed by: NURSE PRACTITIONER

## 2023-01-24 PROCEDURE — 99204 OFFICE O/P NEW MOD 45 MIN: CPT | Mod: S$GLB,,, | Performed by: NURSE PRACTITIONER

## 2023-01-24 PROCEDURE — 99999 PR PBB SHADOW E&M-EST. PATIENT-LVL III: ICD-10-PCS | Mod: PBBFAC,,, | Performed by: NURSE PRACTITIONER

## 2023-01-24 PROCEDURE — 1159F PR MEDICATION LIST DOCUMENTED IN MEDICAL RECORD: ICD-10-PCS | Mod: CPTII,S$GLB,, | Performed by: NURSE PRACTITIONER

## 2023-01-24 PROCEDURE — 1160F PR REVIEW ALL MEDS BY PRESCRIBER/CLIN PHARMACIST DOCUMENTED: ICD-10-PCS | Mod: CPTII,S$GLB,, | Performed by: NURSE PRACTITIONER

## 2023-01-24 PROCEDURE — 73060 XR HUMERUS 2 VIEW LEFT: ICD-10-PCS | Mod: 26,LT,, | Performed by: RADIOLOGY

## 2023-01-24 PROCEDURE — 73060 X-RAY EXAM OF HUMERUS: CPT | Mod: 26,LT,, | Performed by: RADIOLOGY

## 2023-01-24 PROCEDURE — 99999 PR PBB SHADOW E&M-EST. PATIENT-LVL IV: CPT | Mod: PBBFAC,,, | Performed by: NURSE PRACTITIONER

## 2023-01-24 PROCEDURE — 99204 PR OFFICE/OUTPT VISIT, NEW, LEVL IV, 45-59 MIN: ICD-10-PCS | Mod: S$GLB,,, | Performed by: NURSE PRACTITIONER

## 2023-01-24 PROCEDURE — 99999 PR PBB SHADOW E&M-EST. PATIENT-LVL III: CPT | Mod: PBBFAC,,, | Performed by: NURSE PRACTITIONER

## 2023-01-24 PROCEDURE — 1160F RVW MEDS BY RX/DR IN RCRD: CPT | Mod: CPTII,S$GLB,, | Performed by: NURSE PRACTITIONER

## 2023-01-24 PROCEDURE — 3008F PR BODY MASS INDEX (BMI) DOCUMENTED: ICD-10-PCS | Mod: CPTII,S$GLB,, | Performed by: NURSE PRACTITIONER

## 2023-01-24 PROCEDURE — 99999 PR PBB SHADOW E&M-EST. PATIENT-LVL IV: ICD-10-PCS | Mod: PBBFAC,,, | Performed by: NURSE PRACTITIONER

## 2023-01-24 PROCEDURE — 99499 NO LOS: ICD-10-PCS | Mod: S$GLB,,, | Performed by: NURSE PRACTITIONER

## 2023-01-24 PROCEDURE — 99499 UNLISTED E&M SERVICE: CPT | Mod: S$GLB,,, | Performed by: NURSE PRACTITIONER

## 2023-01-24 PROCEDURE — 73060 X-RAY EXAM OF HUMERUS: CPT | Mod: TC,PO,LT

## 2023-01-24 RX ORDER — OMEPRAZOLE 40 MG/1
40 CAPSULE, DELAYED RELEASE ORAL DAILY
Qty: 30 CAPSULE | Refills: 2 | Status: SHIPPED | OUTPATIENT
Start: 2023-01-24 | End: 2023-09-21

## 2023-01-24 RX ORDER — PROMETHAZINE HYDROCHLORIDE 12.5 MG/1
12.5 TABLET ORAL EVERY 6 HOURS PRN
Qty: 30 TABLET | Refills: 1 | Status: SHIPPED | OUTPATIENT
Start: 2023-01-24

## 2023-01-24 NOTE — PROGRESS NOTES
Subjective:       Patient ID: Zoran Corado is a 22 y.o. male, Body mass index is 22.63 kg/m².    Chief Complaint: Emesis      Patient is new to me. Referred by MAJOR Huizar for gastroparesis.     Here with mother, whom assisted with interview.     GI Problem  The primary symptoms include abdominal pain, nausea and vomiting. Primary symptoms do not include fever, weight loss, fatigue, diarrhea, melena, hematemesis, jaundice, hematochezia, dysuria, myalgias, arthralgias or rash.   The abdominal pain began more than 2 days ago (Started over one year ago; intermittent; describes as aching). The abdominal pain has been unchanged since its onset. The abdominal pain is located in the epigastric region. The abdominal pain does not radiate. The abdominal pain is relieved by nothing (eating aggravates pain).   Nausea began more than 1 week ago (Started over one year ago; intermittent; lasts for days to week). The nausea is associated with eating. The nausea is exacerbated by food (Phenergan helps).   The vomiting began more than 2 days ago (Started over one year ago). Frequency: intermittent. The emesis contains stomach contents and bilious material.   The illness does not include chills, anorexia, dysphagia, odynophagia, bloating, constipation, tenesmus, back pain or itching. Significant associated medical issues include GERD (Hx of GERD; reports heartburn; not taking any medication). Associated medical issues do not include inflammatory bowel disease, gallstones, liver disease, alcohol abuse, PUD, gastric bypass, bowel resection, irritable bowel syndrome, hemorrhoids or diverticulitis. Associated medical issues comments: Hx of gastroparesis; tried Reglan in the past with little relief; scheduled for GES.   Review of Systems   Constitutional:  Negative for appetite change, chills, fatigue, fever, unexpected weight change and weight loss.   HENT:  Negative for trouble swallowing.    Respiratory:  Negative for  cough and shortness of breath.    Cardiovascular:  Negative for chest pain.   Gastrointestinal:  Positive for abdominal pain, nausea and vomiting. Negative for abdominal distention, anal bleeding, anorexia, bloating, blood in stool, constipation, diarrhea, dysphagia, hematemesis, hematochezia, jaundice, melena and rectal pain.   Genitourinary:  Negative for difficulty urinating and dysuria.   Musculoskeletal:  Negative for arthralgias, back pain, gait problem and myalgias.   Skin:  Negative for itching and rash.   Neurological:  Negative for speech difficulty.   Psychiatric/Behavioral:  Negative for confusion.      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     Depression in pediatric patient 10/1/2015    Diabetes mellitus 3/1/2012    No prev history of DKA    GERD (gastroesophageal reflux disease)     Hashimoto's thyroiditis     Headache(784.0)     has frequent HA and sometimes responds to Ibuprofen    Hypothyroidism 8/17/2017    Mood disorder     Oppositional defiant disorder 10/1/2015    Otitis media     Pancreatitis     Pneumothorax     Suicidal ideation 1/10/2018    Thyroid disease     Wheezing       Past Surgical History:   Procedure Laterality Date    ADENOIDECTOMY      ADENOIDECTOMY      BRONCHOSCOPY  06/20/2016         TN BRONCHOSCOPY,[id number] W LAVAGE  08/17/2017         TYMPANOSTOMY TUBE PLACEMENT  June of 2002    TYMPANOSTOMY TUBE PLACEMENT        Family History   Problem Relation Age of Onset    Cystic fibrosis Sister     Cystic fibrosis gene carrier Sister     Diabetes Neg Hx     Asthma Neg Hx     Cancer Neg Hx     Early death Neg Hx     Heart disease Neg Hx     Kidney disease Neg Hx     Hypertension Neg Hx     Thyroid disease Neg Hx       Wt Readings from Last 10 Encounters:   01/24/23 67.5 kg (148 lb 13 oz)   01/24/23 65.8 kg (145 lb 1 oz)   01/11/23 65.8 kg (145 lb)    01/10/23 65.8 kg (145 lb)   12/28/22 66.2 kg (146 lb)   12/08/22 66.3 kg (146 lb 2.6 oz)   12/06/22 65 kg (143 lb 4.8 oz)   12/02/22 72.3 kg (159 lb 4.5 oz)   09/09/22 63.9 kg (140 lb 14 oz)   07/08/22 63.5 kg (140 lb)     Lab Results   Component Value Date    WBC 14.55 (H) 12/28/2022    HGB 15.5 12/28/2022    HCT 43.8 12/28/2022    MCV 83 12/28/2022     12/28/2022     CMP  Sodium   Date Value Ref Range Status   12/28/2022 135 (L) 136 - 145 mmol/L Final     Potassium   Date Value Ref Range Status   12/28/2022 4.1 3.5 - 5.1 mmol/L Final     Chloride   Date Value Ref Range Status   12/28/2022 95 95 - 110 mmol/L Final     CO2   Date Value Ref Range Status   12/28/2022 32 (H) 22 - 31 mmol/L Final     Glucose   Date Value Ref Range Status   12/28/2022 216 (H) 70 - 110 mg/dL Final     Comment:     The ADA recommends the following guidelines for fasting glucose:    Normal:       less than 100 mg/dL    Prediabetes:  100 mg/dL to 125 mg/dL    Diabetes:     126 mg/dL or higher     09/09/2018 >500  Final     BUN   Date Value Ref Range Status   12/28/2022 23 (H) 9 - 21 mg/dL Final     Creatinine   Date Value Ref Range Status   12/28/2022 0.81 0.50 - 1.40 mg/dL Final     Calcium   Date Value Ref Range Status   12/28/2022 9.7 8.4 - 10.2 mg/dL Final     Total Protein   Date Value Ref Range Status   12/28/2022 8.0 6.0 - 8.4 g/dL Final     Albumin   Date Value Ref Range Status   12/28/2022 4.6 3.5 - 5.2 g/dL Final     Total Bilirubin   Date Value Ref Range Status   12/28/2022 0.9 0.2 - 1.3 mg/dL Final     Alkaline Phosphatase   Date Value Ref Range Status   12/28/2022 129 38 - 145 U/L Final     AST ([place])   Date Value Ref Range Status   04/12/2016 30 17 - 59 U/L Final     AST   Date Value Ref Range Status   12/28/2022 33 17 - 59 U/L Final     ALT   Date Value Ref Range Status   12/28/2022 38 0 - 50 U/L Final     Anion Gap   Date Value Ref Range Status   12/28/2022 8 8 - 16 mmol/L Final     eGFR if     Date Value Ref Range Status   07/08/2022 >60 >60 mL/min/1.73 m^2 Final     eGFR if non    Date Value Ref Range Status   07/08/2022 >60 >60 mL/min/1.73 m^2 Final     Comment:     Calculation used to obtain the estimated glomerular filtration  rate (eGFR) is the CKD-EPI equation.        Lab Results   Component Value Date    AMYLASE 70 08/06/2021     Lab Results   Component Value Date    LIPASE 50 09/24/2021     Lab Results   Component Value Date    LIPASERES 31 12/06/2022             Reviewed prior medical records including radiology report of X-Ray of abdomen 8/12/21 & endoscopy history (see surgical history).     Objective:      Physical Exam  Constitutional:       General: He is not in acute distress.     Appearance: He is well-developed.   HENT:      Head: Normocephalic.      Right Ear: Hearing normal.      Left Ear: Hearing normal.      Nose: Nose normal.      Mouth/Throat:      Mouth: No oral lesions.      Pharynx: Uvula midline.   Eyes:      General: Lids are normal.      Conjunctiva/sclera: Conjunctivae normal.      Pupils: Pupils are equal, round, and reactive to light.   Neck:      Trachea: Trachea normal.   Cardiovascular:      Rate and Rhythm: Normal rate and regular rhythm.      Heart sounds: Normal heart sounds. No murmur heard.  Pulmonary:      Effort: Pulmonary effort is normal. No respiratory distress.      Breath sounds: Normal breath sounds. No stridor. No wheezing.   Abdominal:      General: Bowel sounds are normal. There is no distension.      Palpations: Abdomen is soft. There is no mass.      Tenderness: There is abdominal tenderness (mild) in the epigastric area. There is no guarding or rebound.   Musculoskeletal:         General: Normal range of motion.      Cervical back: Normal range of motion.   Skin:     General: Skin is warm and dry.      Findings: No rash.      Comments: Non jaundiced   Neurological:      Mental Status: He is alert and oriented to person, place, and  time.   Psychiatric:         Speech: Speech normal.         Behavior: Behavior normal. Behavior is cooperative.         Assessment:       1. Nausea and vomiting, unspecified vomiting type    2. Epigastric pain    3. History of gastroparesis    4. Heartburn           Plan:   All diagnoses and orders for this visit:    Nausea and vomiting, unspecified vomiting type, Epigastric pain & History of gastroparesis  - Start: omeprazole (PRILOSEC) 40 MG capsule; Take 1 capsule (40 mg total) by mouth once daily.  Dispense: 30 capsule; Refill: 2  - Refill and continue: promethazine (PHENERGAN) 12.5 MG Tab; Take 1 tablet (12.5 mg total) by mouth every 6 (six) hours as needed (nausea and vomiting).  Dispense: 30 tablet; Refill: 1  - Schedule EGD   - Continue with gastric emptying study        - Recommend small frequent meals instead of 3 big meals a day, low fat meals & low residue diet.        - Avoid: high fiber (insoluble fiber), red meats, dairy, and non-digestible solids (peels, fruit pulp, etc). Avoid NSAIDs and narcotics.        - Discussed about possible medical therapy for gastroparesis and discussed that this would be managed by one of the GI doctors, lastly surgical options are available at [facility] (Dr. Benito Wood), patient verbalized understanding     Heartburn  - Start: omeprazole (PRILOSEC) 40 MG capsule; Take 1 capsule (40 mg total) by mouth once daily.  Dispense: 30 capsule; Refill: 2  - Schedule EGD   - Take PPI in the morning 30 minutes before breakfast  - Recommend to avoid large meals, avoid eating within 3 hours of bedtime, elevate head of bed if nocturnal symptoms are present, smoking cessation (if current smoker), & weight loss (if overweight).   - Recommend minimize/avoid high-fat foods, chocolate, caffeine, citrus, alcohol, & tomato products.  - Advised to avoid/limit use of NSAID's, since they can cause GI upset, bleeding, and/or ulcers. If needed, take with food.      Recommend follow-up  with GI physician in 4-6 weeks for continued evaluation and management.  If no improvement in symptoms or symptoms worsen, call/follow-up at clinic or go to ER.
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
## 2023-01-25 ENCOUNTER — PATIENT MESSAGE (OUTPATIENT)
Dept: ADMINISTRATIVE | Facility: HOSPITAL | Age: 23
End: 2023-01-25
Payer: COMMERCIAL

## 2023-01-30 ENCOUNTER — PATIENT MESSAGE (OUTPATIENT)
Dept: ENDOCRINOLOGY | Facility: CLINIC | Age: 23
End: 2023-01-30
Payer: COMMERCIAL

## 2023-01-31 NOTE — H&P
History & Physical - Short Stay  Gastroenterology      SUBJECTIVE:     Procedure: Gastroscopy    Chief Complaint/Indication for Procedure: Abdominal pain, nausea and vomiting    History of Present Illness:  See recent GI OV note:    Office Visit   1/24/2023  [place] - Gastroenterology  Beth Kimble NP  Gastroenterology Nausea and vomiting, unspecified vomiting type +3 more  Dx Emesis; Referred by MAJOR Rodriguez,ANP-C  Reason for Visit     Progress Notes    Beth Kimble NP at 1/24/2023  3:00 PM    Status: Signed   Subjective:       Patient ID: Zoran Corado is a 22 y.o. male, Body mass index is 22.63 kg/m².     Chief Complaint: Emesis        Patient is new to me. Referred by MAJOR Huizar for gastroparesis.      Here with mother, whom assisted with interview.      GI Problem  The primary symptoms include abdominal pain, nausea and vomiting. Primary symptoms do not include fever, weight loss, fatigue, diarrhea, melena, hematemesis, jaundice, hematochezia, dysuria, myalgias, arthralgias or rash.   The abdominal pain began more than 2 days ago (Started over one year ago; intermittent; describes as aching). The abdominal pain has been unchanged since its onset. The abdominal pain is located in the epigastric region. The abdominal pain does not radiate. The abdominal pain is relieved by nothing (eating aggravates pain).   Nausea began more than 1 week ago (Started over one year ago; intermittent; lasts for days to week). The nausea is associated with eating. The nausea is exacerbated by food (Phenergan helps).   The vomiting began more than 2 days ago (Started over one year ago). Frequency: intermittent. The emesis contains stomach contents and bilious material.   The illness does not include chills, anorexia, dysphagia, odynophagia, bloating, constipation, tenesmus, back pain or itching. Significant associated medical issues include GERD (Hx of GERD; reports heartburn; not taking any  medication). Associated medical issues do not include inflammatory bowel disease, gallstones, liver disease, alcohol abuse, PUD, gastric bypass, bowel resection, irritable bowel syndrome, hemorrhoids or diverticulitis. Associated medical issues comments: Hx of gastroparesis; tried Reglan in the past with little relief; scheduled for GES.   Review of Systems   Constitutional:  Negative for appetite change, chills, fatigue, fever, unexpected weight change and weight loss.   HENT:  Negative for trouble swallowing.    Respiratory:  Negative for cough and shortness of breath.    Cardiovascular:  Negative for chest pain.   Gastrointestinal:  Positive for abdominal pain, nausea and vomiting. Negative for abdominal distention, anal bleeding, anorexia, bloating, blood in stool, constipation, diarrhea, dysphagia, hematemesis, hematochezia, jaundice, melena and rectal pain.     Wt Readings from Last 10 Encounters:   01/24/23 67.5 kg (148 lb 13 oz)   01/24/23 65.8 kg (145 lb 1 oz)   01/11/23 65.8 kg (145 lb)   01/10/23 65.8 kg (145 lb)   12/28/22 66.2 kg (146 lb)   12/08/22 66.3 kg (146 lb 2.6 oz)   12/06/22 65 kg (143 lb 4.8 oz)   12/02/22 72.3 kg (159 lb 4.5 oz)   09/09/22 63.9 kg (140 lb 14 oz)   07/08/22 63.5 kg (140 lb)      Assessment:       1. Nausea and vomiting, unspecified vomiting type    2. Epigastric pain    3. History of gastroparesis    4. Heartburn           Plan:   All diagnoses and orders for this visit:     Nausea and vomiting, unspecified vomiting type, Epigastric pain & History of gastroparesis  - Start: omeprazole (PRILOSEC) 40 MG capsule; Take 1 capsule (40 mg total) by mouth once daily.  Dispense: 30 capsule; Refill: 2  - Refill and continue: promethazine (PHENERGAN) 12.5 MG Tab; Take 1 tablet (12.5 mg total) by mouth every 6 (six) hours as needed (nausea and vomiting).  Dispense: 30 tablet; Refill: 1  - Schedule EGD   - Continue with gastric emptying study        - Recommend small frequent meals instead  of 3 big meals a day, low fat meals & low residue diet.        - Avoid: high fiber (insoluble fiber), red meats, dairy, and non-digestible solids (peels, fruit pulp, etc). Avoid NSAIDs and narcotics.        - Discussed about possible medical therapy for gastroparesis and discussed that this would be managed by one of the GI doctors, lastly [facility] are available at [facility] (Dr. Benito Wood), patient verbalized understanding      Heartburn  - Start: omeprazole (PRILOSEC) 40 MG capsule; Take 1 capsule (40 mg total) by mouth once daily.  Dispense: 30 capsule; Refill: 2  - Schedule EGD   - Take PPI in the morning 30 minutes before breakfast  - Recommend to avoid large meals, avoid eating within 3 hours of bedtime, elevate head of bed if nocturnal symptoms are present, smoking cessation (if current smoker), & weight loss (if overweight).   - Recommend minimize/avoid high-fat foods, chocolate, caffeine, citrus, alcohol, & tomato products.  - Advised to avoid/limit use of NSAID's, since they can cause GI upset, bleeding, and/or ulcers. If needed, take with food.       Recommend follow-up with GI physician in 4-6 weeks for continued evaluation and management.  If no improvement in symptoms or symptoms worsen, call/follow-up at clinic or go to ER.                PTA Medications   Medication Sig    insulin aspart U-100 (NOVOLOG) 100 unit/mL injection Pt uses 65 units a day via insulin pump---this is 1.95 vials--dispense 2    levothyroxine (SYNTHROID, LEVOTHROID) 175 MCG tablet Take 1 tablet by mouth 6 days per week and 1.5 tablets on day 7.    omeprazole (PRILOSEC) 40 MG capsule Take 1 capsule (40 mg total) by mouth once daily.    prochlorperazine (COMPAZINE) 25 MG suppository Place 1 suppository (25 mg total) rectally every 12 (twelve) hours as needed for Nausea.    promethazine (PHENERGAN) 12.5 MG Tab Take 1 tablet (12.5 mg total) by mouth every 6 (six) hours as needed (nausea and vomiting).     "methocarbamoL (ROBAXIN) 750 MG Tab Take 2 tablets (1,500 mg total) by mouth 3 (three) times daily.       Review of patient's allergies indicates:  No Known Allergies     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     Depression in pediatric patient 10/1/2015    Diabetes mellitus 3/1/2012    No prev history of DKA    GERD (gastroesophageal reflux disease)     Hashimoto's thyroiditis     Headache(784.0)     has frequent HA and sometimes responds to Ibuprofen    Hypothyroidism 8/17/2017    Mood disorder     Oppositional defiant disorder 10/1/2015    Otitis media     Pancreatitis     Pneumothorax     Suicidal ideation 1/10/2018    Thyroid disease     Wheezing      Past Surgical History:   Procedure Laterality Date    ADENOIDECTOMY      ADENOIDECTOMY      BRONCHOSCOPY  06/20/2016         [place] BRONCHOSCOPY,[id number] W LAVAGE  08/17/2017         TYMPANOSTOMY TUBE PLACEMENT  June of 2002    TYMPANOSTOMY TUBE PLACEMENT       Family History   Problem Relation Age of Onset    Cystic fibrosis Sister     Cystic fibrosis gene carrier Sister     Diabetes Neg Hx     Asthma Neg Hx     Cancer Neg Hx     Early death Neg Hx     Heart disease Neg Hx     Kidney disease Neg Hx     Hypertension Neg Hx     Thyroid disease Neg Hx      Social History     Tobacco Use    Smoking status: Every Day     Types: Vaping with nicotine    Smokeless tobacco: Never    Tobacco comments:     dad is former smoker   Substance Use Topics    Alcohol use: Yes     Comment: ocasionally    Drug use: No         OBJECTIVE:     Vital Signs (Most Recent)  Temp: 98.4 °F (36.9 °C) (02/01/23 0719)  Pulse: 100 (02/01/23 0719)  Resp: 17 (02/01/23 0719)  BP: (!) 141/93 (02/01/23 0719)  SpO2: 100 % (02/01/23 0719)    Physical Exam:  : Ht: 5' 8" (172.7 cm)   Wt: 67.1 kg (148 lb)   BMI: 22.50 kg/m²  .                                                   "   GENERAL:  Comfortable, in no acute distress.                                 HEENT EXAM:  Nonicteric.  No adenopathy.  Oropharynx is clear.               NECK:  Supple.                                                               LUNGS:  Clear.                                                               CARDIAC:  Regular rate and rhythm.  S1, S2.  No murmur.                      ABDOMEN:  Soft, positive bowel sounds, nontender.  No hepatosplenomegaly or masses.  No rebound or guarding.                                             EXTREMITIES:  No edema.     MENTAL STATUS:  Alert and oriented.    ASSESSMENT/PLAN:     Assessment: Abdominal pain, nausea and vomiting.  Hx of gastroparesis ([place]).    Plan: Gastroscopy    Anesthesia Plan:   MAC / General Anaesthesia    ASA Grade: ASA 2 - Patient with mild systemic disease with no functional limitations    MALLAMPATI SCORE: I (soft palate, uvula, fauces, and tonsillar pillars visible)

## 2023-02-01 ENCOUNTER — HOSPITAL ENCOUNTER (OUTPATIENT)
Facility: HOSPITAL | Age: 23
Discharge: HOME | End: 2023-02-01
Attending: INTERNAL MEDICINE | Admitting: INTERNAL MEDICINE
Payer: COMMERCIAL

## 2023-02-01 ENCOUNTER — ANESTHESIA (OUTPATIENT)
Dept: ENDOSCOPY | Facility: HOSPITAL | Age: 23
End: 2023-02-01
Payer: COMMERCIAL

## 2023-02-01 ENCOUNTER — ANESTHESIA EVENT (OUTPATIENT)
Dept: ENDOSCOPY | Facility: HOSPITAL | Age: 23
End: 2023-02-01
Payer: COMMERCIAL

## 2023-02-01 DIAGNOSIS — K31.84 GASTROPARESIS: Primary | Chronic | ICD-10-CM

## 2023-02-01 DIAGNOSIS — R11.0 NAUSEA: ICD-10-CM

## 2023-02-01 PROBLEM — R11.2 NAUSEA AND VOMITING: Status: ACTIVE | Noted: 2023-02-01

## 2023-02-01 PROCEDURE — 88341 IMHCHEM/IMCYTCHM EA ADD ANTB: CPT | Mod: 26,,, | Performed by: PATHOLOGY

## 2023-02-01 PROCEDURE — 43239 EGD BIOPSY SINGLE/MULTIPLE: CPT | Mod: ,,, | Performed by: INTERNAL MEDICINE

## 2023-02-01 PROCEDURE — 88312 SPECIAL STAINS GROUP 1: CPT | Mod: 26,,, | Performed by: PATHOLOGY

## 2023-02-01 PROCEDURE — 88305 TISSUE EXAM BY PATHOLOGIST: ICD-10-PCS | Mod: 26,,, | Performed by: PATHOLOGY

## 2023-02-01 PROCEDURE — 88341 IMHCHEM/IMCYTCHM EA ADD ANTB: CPT | Performed by: PATHOLOGY

## 2023-02-01 PROCEDURE — D9220A PRA ANESTHESIA: ICD-10-PCS | Mod: ANES,,, | Performed by: ANESTHESIOLOGY

## 2023-02-01 PROCEDURE — 88305 TISSUE EXAM BY PATHOLOGIST: CPT | Mod: 59 | Performed by: PATHOLOGY

## 2023-02-01 PROCEDURE — 37000009 HC ANESTHESIA EA ADD 15 MINS: Mod: PO | Performed by: INTERNAL MEDICINE

## 2023-02-01 PROCEDURE — D9220A PRA ANESTHESIA: Mod: ANES,,, | Performed by: ANESTHESIOLOGY

## 2023-02-01 PROCEDURE — 63600175 PHARM REV CODE 636 W HCPCS: Mod: PO | Performed by: NURSE ANESTHETIST, CERTIFIED REGISTERED

## 2023-02-01 PROCEDURE — 37000008 HC ANESTHESIA 1ST 15 MINUTES: Mod: PO | Performed by: INTERNAL MEDICINE

## 2023-02-01 PROCEDURE — 43239 EGD BIOPSY SINGLE/MULTIPLE: CPT | Mod: PO | Performed by: INTERNAL MEDICINE

## 2023-02-01 PROCEDURE — 88312 SPECIAL STAINS GROUP 1: CPT | Performed by: PATHOLOGY

## 2023-02-01 PROCEDURE — D9220A PRA ANESTHESIA: ICD-10-PCS | Mod: CRNA,,, | Performed by: NURSE ANESTHETIST, CERTIFIED REGISTERED

## 2023-02-01 PROCEDURE — D9220A PRA ANESTHESIA: Mod: CRNA,,, | Performed by: NURSE ANESTHETIST, CERTIFIED REGISTERED

## 2023-02-01 PROCEDURE — 43239 PR EGD, FLEX, W/BIOPSY, SGL/MULTI: ICD-10-PCS | Mod: ,,, | Performed by: INTERNAL MEDICINE

## 2023-02-01 PROCEDURE — 27201012 HC FORCEPS, HOT/COLD, DISP: Mod: PO | Performed by: INTERNAL MEDICINE

## 2023-02-01 PROCEDURE — 88342 IMHCHEM/IMCYTCHM 1ST ANTB: CPT | Mod: 26,,, | Performed by: PATHOLOGY

## 2023-02-01 PROCEDURE — 25000003 PHARM REV CODE 250: Mod: PO | Performed by: NURSE ANESTHETIST, CERTIFIED REGISTERED

## 2023-02-01 PROCEDURE — 63600175 PHARM REV CODE 636 W HCPCS: Mod: PO | Performed by: INTERNAL MEDICINE

## 2023-02-01 PROCEDURE — 88341 PR IHC OR ICC EACH ADD'L SINGLE ANTIBODY  STAINPR: ICD-10-PCS | Mod: 26,,, | Performed by: PATHOLOGY

## 2023-02-01 PROCEDURE — 88305 TISSUE EXAM BY PATHOLOGIST: CPT | Mod: 26,,, | Performed by: PATHOLOGY

## 2023-02-01 PROCEDURE — 88342 IMHCHEM/IMCYTCHM 1ST ANTB: CPT | Performed by: PATHOLOGY

## 2023-02-01 PROCEDURE — 88312 PR  SPECIAL STAINS,GROUP I: ICD-10-PCS | Mod: 26,,, | Performed by: PATHOLOGY

## 2023-02-01 PROCEDURE — 88342 CHG IMMUNOCYTOCHEMISTRY: ICD-10-PCS | Mod: 26,,, | Performed by: PATHOLOGY

## 2023-02-01 RX ORDER — SODIUM CHLORIDE, SODIUM LACTATE, POTASSIUM CHLORIDE, CALCIUM CHLORIDE 600; 310; 30; 20 MG/100ML; MG/100ML; MG/100ML; MG/100ML
INJECTION, SOLUTION INTRAVENOUS CONTINUOUS
Status: DISCONTINUED | OUTPATIENT
Start: 2023-02-01 | End: 2023-02-01 | Stop reason: HOSPADM

## 2023-02-01 RX ORDER — SUCRALFATE 1 G/1
1 TABLET ORAL
Qty: 120 TABLET | Refills: 11 | Status: SHIPPED | OUTPATIENT
Start: 2023-02-01 | End: 2024-02-01

## 2023-02-01 RX ORDER — PROPOFOL 10 MG/ML
VIAL (ML) INTRAVENOUS
Status: DISCONTINUED | OUTPATIENT
Start: 2023-02-01 | End: 2023-02-01

## 2023-02-01 RX ORDER — ONDANSETRON 2 MG/ML
INJECTION INTRAMUSCULAR; INTRAVENOUS
Status: DISCONTINUED | OUTPATIENT
Start: 2023-02-01 | End: 2023-02-01

## 2023-02-01 RX ORDER — MISOPROSTOL 100 UG/1
100 TABLET ORAL
Qty: 120 TABLET | Refills: 11 | Status: SHIPPED | OUTPATIENT
Start: 2023-02-01 | End: 2024-03-01

## 2023-02-01 RX ORDER — FAMOTIDINE 40 MG/1
40 TABLET, FILM COATED ORAL NIGHTLY
Qty: 90 TABLET | Refills: 3 | Status: SHIPPED | OUTPATIENT
Start: 2023-02-01 | End: 2024-03-01

## 2023-02-01 RX ORDER — LIDOCAINE HCL/PF 100 MG/5ML
SYRINGE (ML) INTRAVENOUS
Status: DISCONTINUED | OUTPATIENT
Start: 2023-02-01 | End: 2023-02-01

## 2023-02-01 RX ORDER — SODIUM CHLORIDE 0.9 % (FLUSH) 0.9 %
10 SYRINGE (ML) INJECTION
Status: DISCONTINUED | OUTPATIENT
Start: 2023-02-01 | End: 2023-02-01 | Stop reason: HOSPADM

## 2023-02-01 RX ADMIN — ONDANSETRON 4 MG: 2 INJECTION, SOLUTION INTRAMUSCULAR; INTRAVENOUS at 07:02

## 2023-02-01 RX ADMIN — PROPOFOL 50 MG: 10 INJECTION, EMULSION INTRAVENOUS at 07:02

## 2023-02-01 RX ADMIN — SODIUM CHLORIDE, POTASSIUM CHLORIDE, SODIUM LACTATE AND CALCIUM CHLORIDE: 600; 310; 30; 20 INJECTION, SOLUTION INTRAVENOUS at 07:02

## 2023-02-01 RX ADMIN — LIDOCAINE HYDROCHLORIDE 100 MG: 20 INJECTION, SOLUTION INTRAVENOUS at 07:02

## 2023-02-01 RX ADMIN — PROPOFOL 50 MG: 10 INJECTION, EMULSION INTRAVENOUS at 08:02

## 2023-02-01 RX ADMIN — PROPOFOL 100 MG: 10 INJECTION, EMULSION INTRAVENOUS at 07:02
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
## 2023-02-01 NOTE — TRANSFER OF CARE
"Anesthesia Transfer of Care Note    Patient: Zoran Corado    Procedure(s) Performed: Procedure(s) (LRB):  EGD (ESOPHAGOGASTRODUODENOSCOPY) (N/A)    Patient location: PACU    Anesthesia Type: general    Transport from OR: Transported from OR on room air with adequate spontaneous ventilation    Post pain: adequate analgesia    Post assessment: no apparent anesthetic complications and tolerated procedure well    Post vital signs: stable    Level of consciousness: responds to stimulation    Nausea/Vomiting: no nausea/vomiting    Complications: none    Transfer of care protocol was followed      Last vitals:   Visit Vitals  BP (!) 141/93 (BP Location: Right arm, Patient Position: Lying)   Pulse 100   Temp 36.9 °C (98.4 °F) (Skin)   Resp 17   Ht 5' 8" (1.727 m)   Wt 67.1 kg (148 lb)   SpO2 100%   BMI 22.50 kg/m²     "

## 2023-02-01 NOTE — ANESTHESIA POSTPROCEDURE EVALUATION
Anesthesia Post Evaluation    Patient: Zoran Corado    Procedure(s) Performed: Procedure(s) (LRB):  EGD (ESOPHAGOGASTRODUODENOSCOPY) (N/A)    Final Anesthesia Type: general      Patient location during evaluation: PACU  Patient participation: Yes- Able to Participate  Level of consciousness: awake and alert and oriented  Post-procedure vital signs: reviewed and stable  Pain management: adequate  Airway patency: patent    PONV status at discharge: No PONV  Anesthetic complications: no      Cardiovascular status: blood pressure returned to baseline  Respiratory status: unassisted, spontaneous ventilation and room air  Hydration status: euvolemic  Follow-up not needed.          Vitals Value Taken Time   /74 02/01/23 0823   Temp 36.4 °C (97.5 °F) 02/01/23 0810   Pulse 98 02/01/23 0823   Resp 18 02/01/23 0823   SpO2 100 % 02/01/23 0823         No case tracking events are documented in the log.      Pain/Mindy Score: Mindy Score: 10 (2/1/2023  8:10 AM)

## 2023-02-01 NOTE — BRIEF OP NOTE
Discharge Note  Short Stay      SUMMARY     Admit Date: 2/1/2023    Attending Physician: José Miguel Russo Jr., MD     Discharge Physician: José Miguel Russo Jr., MD    Discharge Date: 2/1/2023 8:35 AM    Final Diagnosis: Epigastric pain [R10.13]  Nausea and vomiting, unspecified vomiting type [R11.2]  Heartburn [R12]  Gastroparesis [K31.84]      Impression:            - Normal oropharynx.                          - Normal larynx.                          - Normal upper third of esophagus and middle third                          of esophagus.                          - LA Grade C reflux esophagitis with no bleeding.                          Biopsied.                          - Z-line regular, 39 cm from the incisors.                          - Patulous lower esophageal sphincter.                          - Excessive gastric fluid. Fluid aspiration                          performed.                          - Due to patient symptoms and retained gastric                          contents, suspect gastroparesis secondary to                          diabetes mellitus type I.                          - Gastritis.                          - Slight antritis. Biopsied.                          - Normal pylorus.                          - Normal examined duodenum.   Recommendation:        - Discharge patient to home.                          - Await pathology results.                          - Follow an antireflux regimen.                          - Diabetic (ADA) diet.                          - Use Prilosec (omeprazole) 40 mg PO daily.                          - Use Pepcid (famotidine) 40 mg PO nightly.                          - Use sucralfate tablets 1 gram PO QID.                          - Use misoprostol 100 micrograms PO QID.                          - Call the G.I. clinic in 2 weeks for reports (if                          you haven't heard from us sooner) [phone].                          - Return to GI clinic in  4-6 weeks.                          - Consider referral to a surgeon, for a gastric                          pacer.   José Miguel Russo MD   2/1/2023      Disposition: HOME OR SELF CARE    Patient Instructions:   Current Discharge Medication List        START taking these medications    Details   famotidine (PEPCID) 40 MG tablet Take 1 tablet (40 mg total) by mouth every evening. , about an hour before bedtime.  Qty: 90 tablet, Refills: 3      miSOPROStoL (CYTOTEC) 100 MCG Tab Take 1 tablet (100 mcg total) by mouth 4 (four) times daily before meals and nightly.  Qty: 120 tablet, Refills: 11    Associated Diagnoses: Gastroparesis      sucralfate (CARAFATE) 1 gram tablet Take 1 tablet (1 g total) by mouth 4 (four) times daily before meals and nightly.  Qty: 120 tablet, Refills: 11           CONTINUE these medications which have NOT CHANGED    Details   insulin aspart U-100 (NOVOLOG) 100 unit/mL injection Pt uses 65 units a day via insulin pump---this is 1.95 vials--dispense 2  Qty: 20 mL, Refills: 12      levothyroxine (SYNTHROID, LEVOTHROID) 175 MCG tablet Take 1 tablet by mouth 6 days per week and 1.5 tablets on day 7.  Qty: 30 tablet, Refills: 6    Associated Diagnoses: Hypothyroidism, unspecified type      omeprazole (PRILOSEC) 40 MG capsule Take 1 capsule (40 mg total) by mouth once daily.  Qty: 30 capsule, Refills: 2    Associated Diagnoses: Epigastric pain; Nausea and vomiting, unspecified vomiting type; Heartburn      prochlorperazine (COMPAZINE) 25 MG suppository Place 1 suppository (25 mg total) rectally every 12 (twelve) hours as needed for Nausea.  Qty: 10 suppository, Refills: 0    Associated Diagnoses: Gastroparesis      promethazine (PHENERGAN) 12.5 MG Tab Take 1 tablet (12.5 mg total) by mouth every 6 (six) hours as needed (nausea and vomiting).  Qty: 30 tablet, Refills: 1    Associated Diagnoses: Nausea and vomiting, unspecified vomiting type      methocarbamoL (ROBAXIN) 750 MG Tab Take 2 tablets  (1,500 mg total) by mouth 3 (three) times daily.  Qty: 60 tablet, Refills: 3    Associated Diagnoses: Other closed displaced fracture of proximal end of left humerus, initial encounter             Discharge Procedure Orders (must include Diet, Follow-up, Activity)    Follow Up:  Follow up with PCP as per your routine.  Please follow an anti reflux diet and your ADA diet.  Activity as tolerated.    No driving day of procedure.

## 2023-02-01 NOTE — PROVATION PATIENT INSTRUCTIONS
Discharge Summary/Instructions after an Endoscopic Procedure  Patient Name: Zoran Corado  Patient MRN: [id number]  Patient YOB: 2000 Wednesday, February 1, 2023  José Miguel Russo MD  Dear patient,  As a result of recent federal legislation (The Federal Cures Act), you may   receive lab or pathology results from your procedure in your [facility]   account before your physician is able to contact you. Your physician or   their representative will relay the results to you with their   recommendations at their soonest availability.  Thank you,  RESTRICTIONS:  During your procedure today, you received medications for sedation.  These   medications may affect your judgment, balance and coordination.  Therefore,   for 24 hours, you have the following restrictions:   - DO NOT drive a car, operate machinery, make legal/financial decisions,   sign important papers or drink alcohol.    ACTIVITY:  Today: no heavy lifting, straining or running due to procedural   sedation/anesthesia.  The following day: return to full activity including work.  DIET:  Eat and drink normally unless instructed otherwise.     TREATMENT FOR COMMON SIDE EFFECTS:  - Mild abdominal pain, nausea, belching, bloating or excessive gas:  rest,   eat lightly and use a heating pad.  - Sore Throat: treat with throat lozenges and/or gargle with warm salt   water.  - Because air was used during the procedure, expelling large amounts of air   from your rectum or belching is normal.  - If a bowel prep was taken, you may not have a bowel movement for 1-3 days.    This is normal.  SYMPTOMS TO WATCH FOR AND REPORT TO YOUR PHYSICIAN:  1. Abdominal pain or bloating, other than gas cramps.  2. Chest pain.  3. Back pain.  4. Signs of infection such as: chills or fever occurring within 24 hours   after the procedure.  5. Rectal bleeding, which would show as bright red, maroon, or black stools.   (A tablespoon of blood from the rectum is not serious,  especially if   hemorrhoids are present.)  6. Vomiting.  7. Weakness or dizziness.  GO DIRECTLY TO THE NEAREST EMERGENCY ROOM IF YOU HAVE ANY OF THE FOLLOWING:      Difficulty breathing              Chills and/or fever over 101 F   Persistent vomiting and/or vomiting blood   Severe abdominal pain   Severe chest pain   Black, tarry stools   Bleeding- more than one tablespoon   Any other symptom or condition that you feel may need urgent attention  Your doctor recommends these additional instructions:  If any biopsies were taken, your doctors clinic will contact you in 1 to 2   weeks with any results.  Follow an antireflux regimen.  This includes:       - Do not lie down for at least 3 to 4 hours after meals.        - Raise the head of the bed 4 to 6 inches.        - Decrease excess weight.        - Avoid citrus juices and other acidic foods, alcohol, chocolate, mints,   coffee and other caffeinated beverages, carbonated beverages, fatty and   fried foods.        - Avoid tight-fitting clothing.        - Avoid cigarettes and other tobacco products.   Eat a diabetic (ADA) diet.   Take Prilosec (omeprazole) 40 mg by mouth once a day.   Take Pepcid (famotidine) 40 mg by mouth every night.   Take Carafate (sucralfate) tablets 1 gram by mouth four times a day, before   meals and at bedtime.   Take Cytotec (misoprostol) 100 micrograms by mouth four times a day, before   meals and at bedtime.   Return to GI clinic in 4-6 weeks.   For questions, problems or results please call your physician - José Miguel Russo MD at Work:  [phone].  EMERGENCY PHONE NUMBER: [phone], LAB RESULTS: [phone]  IF A COMPLICATION OR EMERGENCY SITUATION ARISES AND YOU ARE UNABLE TO REACH   YOUR PHYSICIAN - GO DIRECTLY TO THE EMERGENCY ROOM.  ___________________________________________  Nurse Signature  ___________________________________________  Patient/Designated Responsible Party Signature  José Miguel Russo MD  2/1/2023  8:30:37 AM  This report has been verified and signed electronically.  Dear patient,  As a result of recent federal legislation (The Federal Cures Act), you may   receive lab or pathology results from your procedure in your [facility]   account before your physician is able to contact you. Your physician or   their representative will relay the results to you with their   recommendations at their soonest availability.  Thank you.  PROVATION

## 2023-02-01 NOTE — PATIENT INSTRUCTIONS
Recovery After Procedural Sedation (Adult)   You have been given medicine by vein to make you sleep during your surgery. This may have included both a pain medicine and sleeping medicine. Most of the effects have worn off. But you may still have some drowsiness for the next 6 to 8 hours.  Home care  Follow these guidelines when you get home:  For the next 8 hours, you should be watched by a responsible adult. This person should make sure your condition is not getting worse.  Don't drink any alcohol for the next 24 hours.  Don't drive, operate dangerous machinery, or make important business or personal decisions during the next 24 hours.  To prevent injury or falls, use caution when standing and walking for at least 24 hours after your procedure.  Note: Your healthcare provider may tell you not to take any medicine by mouth for pain or sleep in the next 4 hours. These medicines may react with the medicines you were given in the hospital. This could cause a much stronger response than usual.  Follow-up care  Follow up with your healthcare provider if you are not alert and back to your usual level of activity within 12 hours.  When to seek medical advice  Call your healthcare provider right away if any of these occur:  Drowsiness gets worse  Weakness or dizziness gets worse  Repeated vomiting  You can't be awakened  Fever  New rash  [employer] last reviewed this educational content on 9/1/2019  © 7730-5118 [employer]. [address] [place] [address], [place] [address]. All rights reserved. This information is not intended as a substitute for professional medical care. Always follow your healthcare professional's instructions       Tips to Control Acid Reflux    To control acid reflux, youll need to make some basic diet and lifestyle changes. The simple steps outlined below may be all youll need to ease discomfort.  Watch what you eat  Avoid fatty foods and spicy foods.  Eat fewer acidic foods, such as citrus and  tomato-based foods. These can increase symptoms.  Limit drinking alcohol, caffeine, and fizzy beverages. All increase acid reflux.  Try limiting chocolate, peppermint, and spearmint. These can worsen acid reflux in some people.  Watch when you eat  Avoid lying down for 3 hours after eating.  Do not snack before going to bed.  Raise your head  Raising your head and upper body by 4 to 6 inches helps limit reflux when youre lying down. Put blocks under the head of your bed frame to raise it.  Other changes  Lose weight, if you need to  Dont exercise near bedtime  Avoid tight-fitting clothes  Limit aspirin and ibuprofen  Stop smoking   Date Last Reviewed: 7/1/2016  © 9349-3739 [employer]. [address] [place] [address], [place] [address]. All rights reserved. This information is not intended as a substitute for professional medical care. Always follow your healthcare professional's instructions.

## 2023-02-02 ENCOUNTER — HOSPITAL ENCOUNTER (OUTPATIENT)
Dept: RADIOLOGY | Facility: HOSPITAL | Age: 23
Discharge: HOME | End: 2023-02-02
Attending: NURSE PRACTITIONER
Payer: COMMERCIAL

## 2023-02-02 VITALS
SYSTOLIC BLOOD PRESSURE: 120 MMHG | DIASTOLIC BLOOD PRESSURE: 72 MMHG | HEIGHT: 68 IN | BODY MASS INDEX: 22.43 KG/M2 | OXYGEN SATURATION: 100 % | RESPIRATION RATE: 18 BRPM | HEART RATE: 88 BPM | TEMPERATURE: 98 F | WEIGHT: 148 LBS

## 2023-02-08 LAB
FINAL PATHOLOGIC DIAGNOSIS: NORMAL
GROSS: NORMAL
Lab: NORMAL

## 2023-02-09 ENCOUNTER — PATIENT MESSAGE (OUTPATIENT)
Dept: GASTROENTEROLOGY | Facility: CLINIC | Age: 23
End: 2023-02-09
Payer: COMMERCIAL

## 2023-02-09 DIAGNOSIS — K31.84 GASTROPARESIS: Primary | Chronic | ICD-10-CM

## 2023-02-09 RX ORDER — PROCHLORPERAZINE 25 MG
25 SUPPOSITORY, RECTAL RECTAL 2 TIMES DAILY
Qty: 30 SUPPOSITORY | Refills: 2 | Status: SHIPPED | OUTPATIENT
Start: 2023-02-09 | End: 2023-09-21 | Stop reason: SDUPTHER

## 2023-02-10 ENCOUNTER — PATIENT MESSAGE (OUTPATIENT)
Dept: GASTROENTEROLOGY | Facility: CLINIC | Age: 23
End: 2023-02-10
Payer: COMMERCIAL

## 2023-02-10 ENCOUNTER — TELEPHONE (OUTPATIENT)
Dept: GASTROENTEROLOGY | Facility: CLINIC | Age: 23
End: 2023-02-10
Payer: COMMERCIAL

## 2023-02-10 NOTE — TELEPHONE ENCOUNTER
----- Message from Litoayana Krause sent at 2/10/2023 12:01 PM CST -----  Contact: Karen from [facility] [place] at [phone]  Type:  Pharmacy Calling to Clarify an RX    Name of Caller:  Karen  Pharmacy Name:  [facility]  Prescription Name:  magic mouthwash  What do they need to clarify?:  ingredients  Best Call Back Number:  [phone]  Additional Information:  Karen from [facility] is calling the office to get the ingredients to the GI cocktail for the pt. Please call back and advise.

## 2023-02-10 NOTE — TELEPHONE ENCOUNTER
The pharmacy needs further information for the GI cocktail prescription they are unable to fill it as is.

## 2023-02-13 ENCOUNTER — PATIENT MESSAGE (OUTPATIENT)
Dept: GASTROENTEROLOGY | Facility: CLINIC | Age: 23
End: 2023-02-13
Payer: COMMERCIAL

## 2023-02-13 ENCOUNTER — TELEPHONE (OUTPATIENT)
Dept: GASTROENTEROLOGY | Facility: CLINIC | Age: 23
End: 2023-02-13
Payer: COMMERCIAL

## 2023-02-13 NOTE — TELEPHONE ENCOUNTER
Attemtped to reach number in message, received a message that number could not be completed as dialed.

## 2023-02-13 NOTE — TELEPHONE ENCOUNTER
----- Message from Anil Bejarano sent at 2/13/2023 11:38 AM CST -----      Name of Who is Calling:Tiffany/pharmacy          What is the request in detail:Pharmacy is requesting a call back for directions on a prescription for PT.    GI cocktail antac/dicyc/lidoc 400 mL 3 2/9/2023  No  Sig - Route: Take 10 mLs by mouth every 4 to 6 hours as needed (nausea, chest discomfort). - Oral            Can the clinic reply by [facility]:no          What Number to Call Back if not in [phone]

## 2023-02-14 ENCOUNTER — HOSPITAL ENCOUNTER (OUTPATIENT)
Dept: RADIOLOGY | Facility: HOSPITAL | Age: 23
Discharge: HOME | End: 2023-02-14
Attending: FAMILY MEDICINE
Payer: COMMERCIAL

## 2023-02-15 NOTE — TELEPHONE ENCOUNTER
Called [facility], spoke with Jacoby, Jacoby states that they have filled the prescription and the mom has received it.

## 2023-02-16 ENCOUNTER — TELEPHONE (OUTPATIENT)
Dept: GASTROENTEROLOGY | Facility: CLINIC | Age: 23
End: 2023-02-16
Payer: COMMERCIAL

## 2023-03-31 ENCOUNTER — OFFICE VISIT (OUTPATIENT)
Dept: ENDOCRINOLOGY | Facility: CLINIC | Age: 23
End: 2023-03-31
Payer: MEDICAID

## 2023-03-31 ENCOUNTER — PATIENT MESSAGE (OUTPATIENT)
Dept: ENDOCRINOLOGY | Facility: CLINIC | Age: 23
End: 2023-03-31

## 2023-03-31 VITALS
BODY MASS INDEX: 23.23 KG/M2 | HEIGHT: 68 IN | SYSTOLIC BLOOD PRESSURE: 124 MMHG | DIASTOLIC BLOOD PRESSURE: 68 MMHG | HEART RATE: 65 BPM | WEIGHT: 153.31 LBS

## 2023-03-31 DIAGNOSIS — K31.84 GASTROPARESIS: ICD-10-CM

## 2023-03-31 DIAGNOSIS — E10.65 TYPE 1 DIABETES MELLITUS WITH HYPERGLYCEMIA: Primary | ICD-10-CM

## 2023-03-31 DIAGNOSIS — Z46.81 INSULIN PUMP FITTING OR ADJUSTMENT: ICD-10-CM

## 2023-03-31 DIAGNOSIS — E03.9 HYPOTHYROIDISM, UNSPECIFIED TYPE: ICD-10-CM

## 2023-03-31 PROCEDURE — 3008F BODY MASS INDEX DOCD: CPT | Mod: CPTII,,, | Performed by: NURSE PRACTITIONER

## 2023-03-31 PROCEDURE — 1159F PR MEDICATION LIST DOCUMENTED IN MEDICAL RECORD: ICD-10-PCS | Mod: CPTII,,, | Performed by: NURSE PRACTITIONER

## 2023-03-31 PROCEDURE — 99999 PR PBB SHADOW E&M-EST. PATIENT-LVL III: CPT | Mod: PBBFAC,,, | Performed by: NURSE PRACTITIONER

## 2023-03-31 PROCEDURE — 3078F DIAST BP <80 MM HG: CPT | Mod: CPTII,,, | Performed by: NURSE PRACTITIONER

## 2023-03-31 PROCEDURE — 1159F MED LIST DOCD IN RCRD: CPT | Mod: CPTII,,, | Performed by: NURSE PRACTITIONER

## 2023-03-31 PROCEDURE — 99214 OFFICE O/P EST MOD 30 MIN: CPT | Mod: 25,S$PBB,, | Performed by: NURSE PRACTITIONER

## 2023-03-31 PROCEDURE — 1160F RVW MEDS BY RX/DR IN RCRD: CPT | Mod: CPTII,,, | Performed by: NURSE PRACTITIONER

## 2023-03-31 PROCEDURE — 3074F PR MOST RECENT SYSTOLIC BLOOD PRESSURE < 130 MM HG: ICD-10-PCS | Mod: CPTII,,, | Performed by: NURSE PRACTITIONER

## 2023-03-31 PROCEDURE — 3078F PR MOST RECENT DIASTOLIC BLOOD PRESSURE < 80 MM HG: ICD-10-PCS | Mod: CPTII,,, | Performed by: NURSE PRACTITIONER

## 2023-03-31 PROCEDURE — 3008F PR BODY MASS INDEX (BMI) DOCUMENTED: ICD-10-PCS | Mod: CPTII,,, | Performed by: NURSE PRACTITIONER

## 2023-03-31 PROCEDURE — 3074F SYST BP LT 130 MM HG: CPT | Mod: CPTII,,, | Performed by: NURSE PRACTITIONER

## 2023-03-31 PROCEDURE — 99999 PR PBB SHADOW E&M-EST. PATIENT-LVL III: ICD-10-PCS | Mod: PBBFAC,,, | Performed by: NURSE PRACTITIONER

## 2023-03-31 PROCEDURE — 95251 CONT GLUC MNTR ANALYSIS I&R: CPT | Mod: ,,, | Performed by: NURSE PRACTITIONER

## 2023-03-31 PROCEDURE — 99214 PR OFFICE/OUTPT VISIT, EST, LEVL IV, 30-39 MIN: ICD-10-PCS | Mod: 25,S$PBB,, | Performed by: NURSE PRACTITIONER

## 2023-03-31 PROCEDURE — 95251 PR GLUCOSE MONITOR, 72 HOUR, PHYS INTERP: ICD-10-PCS | Mod: ,,, | Performed by: NURSE PRACTITIONER

## 2023-03-31 PROCEDURE — 1160F PR REVIEW ALL MEDS BY PRESCRIBER/CLIN PHARMACIST DOCUMENTED: ICD-10-PCS | Mod: CPTII,,, | Performed by: NURSE PRACTITIONER

## 2023-03-31 PROCEDURE — 99213 OFFICE O/P EST LOW 20 MIN: CPT | Mod: PBBFAC,PO | Performed by: NURSE PRACTITIONER

## 2023-03-31 NOTE — PROGRESS NOTES
Subjective:       Patient ID: Zoran Corado is a 22 y.o. male.    Chief Complaint: Diabetes    HPI Pt is a 22 y.o. wm  with a diagnosis of Type 1 diabetes mellitus  (2006 age 7 years). iagnosed approximately hypothyroidism, as well as chronic conditions pending review including cystic fibrosis carrier , gastroparesis.   Pt initially dx with Type 1 DM at age 7 years, and CF  (2015). later on. Has used insulin pump but interfered with job.  He has frequent and recurrent hospitalizations for DKA and  Other pertinent medical and social information noted includes, but not limited to: Works in [employer].     Interim Events: march 31, 2023:  Pt returns for routine f/u. Gastroparesis exacerbations have been relatively brief.  Looks much better.  Pump and sensor downloaded.  A couple of days he was having n/v and primarily slept--so those glucoses were somewhat elevated--and he had an infusion site issue and that markedly elevated rreadigns.      Current DM meds:   Tandem pump  ~ 65  units a day. (With exacerbations)        Failed DM meds:  none        Back up plan for insulin pump hiatus: 28 units of basal,  5 units with meals plus ss 1:150   Statin: na        Not tolerated statin : none   ACE/ARB:na       Not tolerated ACE/ARB: na   Known Diabetic complications: gastroparesis     Dec 2, 2022:  Pt new to me--needed Friday appt, other provider no longer available.  Pleasant, good historian.  He changed jobs and restarted pump.  Date is off.  No focal complaints with exception of bloating r/t gastroparesis.   He is/was being worked up for gastric pacemaker at [facility].  He did have a spell in last couple of weeks sensor delayed in shipment so no data available. He has had frequent DKA--partially r/t gastroparesis, appears a lot was related to cannabinoid hyperemesis syndrome, and a couple of times missed insulin when not on pump.  He is has since stopped marijuana with job, and that has been beneficial.  He is also missing  levo, and when takes, takes with other meds.       Sensor Interpretation       Prominent Theme/Finding:  Often not bolusing for meals, but microoluses are often kicking in, and likely working more as a square wave with gastroparesis, as no hypoglycemia note,  Do note some marked glucose spikes randomly.           Review of Systems   Constitutional:  Negative for appetite change and unexpected weight change.   HENT:  Negative for hearing loss and trouble swallowing.    Eyes:  Negative for photophobia and visual disturbance.   Respiratory:  Negative for cough and shortness of breath.    Cardiovascular:  Negative for chest pain and leg swelling.   Gastrointestinal:  Negative for abdominal distention, abdominal pain, constipation and diarrhea.   Genitourinary:  Negative for difficulty urinating and urgency.   Musculoskeletal:  Negative for arthralgias and myalgias.   Neurological:  Negative for weakness and numbness.   Psychiatric/Behavioral:  Negative for sleep disturbance. The patient is not nervous/anxious.        Objective:      Physical Exam  Constitutional:       Appearance: Normal appearance.   HENT:      Head: Normocephalic and atraumatic.      Nose: Nose normal.      Mouth/Throat:      Mouth: Mucous membranes are moist.      Pharynx: Oropharynx is clear.   Eyes:      Extraocular Movements: Extraocular movements intact.      Conjunctiva/sclera: Conjunctivae normal.      Pupils: Pupils are equal, round, and reactive to light.   Neck:      Vascular: No carotid bruit.   Cardiovascular:      Rate and Rhythm: Normal rate and regular rhythm.      Pulses: Normal pulses.      Heart sounds: Normal heart sounds.   Pulmonary:      Effort: Pulmonary effort is normal.      Breath sounds: Normal breath sounds.   Musculoskeletal:         General: Normal range of motion.      Cervical back: Normal range of motion and neck supple.      Right lower leg: No edema.      Left lower leg: No edema.      Comments: Feet: no open wounds  "or calluses. GOod pedal care. Pedal pulses +2 bilaterally   Sensation via monofilament intact.    Lymphadenopathy:      Cervical: No cervical adenopathy.   Skin:     General: Skin is warm and dry.   Neurological:      General: No focal deficit present.      Mental Status: He is alert and oriented to person, place, and time.   Psychiatric:         Mood and Affect: Mood normal.         Behavior: Behavior normal.         Thought Content: Thought content normal.         Judgment: Judgment normal.           /68 (BP Location: Right arm, Patient Position: Sitting, BP Method: Large (Manual))   Pulse 65   Ht 5' 8" (1.727 m)   Wt 69.6 kg (153 lb 5.3 oz)   BMI 23.31 kg/m²     Hemoglobin A1C   Date Value Ref Range Status   07/08/2022 9.1 (H) 0.0 - 5.6 % Final     Comment:     Reference Interval:  5.0 - 5.6 Normal   5.7 - 6.4 High Risk   > 6.5 Diabetic      Hgb A1c results are standardized based on the (NGSP) National   Glycohemoglobin Standardization Program.      Hemoglobin A1C levels are related to mean serum/plasma glucose   during the preceding 2-3 months.        04/11/2022 7.1 (H) 4.0 - 5.6 % Final     Comment:     ADA Screening Guidelines:  5.7-6.4%  Consistent with prediabetes  >or=6.5%  Consistent with diabetes    High levels of fetal hemoglobin interfere with the HbA1C  assay. Heterozygous hemoglobin variants (HbS, HgC, etc)do  not significantly interfere with this assay.   However, presence of multiple variants may affect accuracy.     12/02/2021 8.6 (H) 4.0 - 5.6 % Final     Comment:     ADA Screening Guidelines:  5.7-6.4%  Consistent with prediabetes  >or=6.5%  Consistent with diabetes    High levels of fetal hemoglobin interfere with the HbA1C  assay. Heterozygous hemoglobin variants (HbS, HgC, etc)do  not significantly interfere with this assay.   However, presence of multiple variants may affect accuracy.         Chemistry        Component Value Date/Time     (L) 12/28/2022 1220    K 4.1 " 12/28/2022 1220    CL 95 12/28/2022 1220    CO2 32 (H) 12/28/2022 1220    BUN 23 (H) 12/28/2022 1220    CREATININE 0.81 12/28/2022 1220     (H) 12/28/2022 1220    GLU >500 09/09/2018 0433        Component Value Date/Time    CALCIUM 9.7 12/28/2022 1220    ALKPHOS 129 12/28/2022 1220    AST 33 12/28/2022 1220    AST 30 04/12/2016 1113    ALT 38 12/28/2022 1220    BILITOT 0.9 12/28/2022 1220    ESTGFRAFRICA >60 07/08/2022 1342    EGFRNONAA >60 07/08/2022 1342          Lab Results   Component Value Date    CHOL 221 (H) 07/28/2017     Lab Results   Component Value Date    HDL 52 07/28/2017     Lab Results   Component Value Date    LDLCALC 138.2 07/28/2017     Lab Results   Component Value Date    TRIG 154 (H) 07/28/2017     Lab Results   Component Value Date    CHOLHDL 23.5 07/28/2017     Lab Results   Component Value Date    MICALBCREAT Unable to calculate 12/02/2021     Lab Results   Component Value Date    TSH 6.777 (H) 04/11/2022     No results found for: [id number]    Assessment:     1. Type 1 diabetes mellitus with hyperglycemia        2. Gastroparesis        3. Hypothyroidism, unspecified type        4. Insulin pump fitting or adjustment                Plan:     Reinforced levo--qam--empty stomach, water only ideally an hour before eating. On levo 1.75    Would likely do better bolusing after meals--if he remembers.       No changes today. Too many events skewing glucose patterns for evaluation     ORDERS 03/31/2023   6 weeks with me.        3 mo with fasting cmp, lipids, a1c, tsh, urine mc/ prior---Fridays only. --Pt will make on portal

## 2023-04-08 ENCOUNTER — PATIENT MESSAGE (OUTPATIENT)
Dept: ADMINISTRATIVE | Facility: HOSPITAL | Age: 23
End: 2023-04-08
Payer: MEDICAID

## 2023-04-19 ENCOUNTER — PATIENT MESSAGE (OUTPATIENT)
Dept: ADMINISTRATIVE | Facility: HOSPITAL | Age: 23
End: 2023-04-19
Payer: MEDICAID

## 2023-06-17 ENCOUNTER — PATIENT MESSAGE (OUTPATIENT)
Dept: ENDOCRINOLOGY | Facility: CLINIC | Age: 23
End: 2023-06-17
Payer: MEDICAID

## 2023-06-19 ENCOUNTER — PATIENT MESSAGE (OUTPATIENT)
Dept: ENDOCRINOLOGY | Facility: CLINIC | Age: 23
End: 2023-06-19
Payer: MEDICAID

## 2023-06-19 RX ORDER — INSULIN ASPART 100 [IU]/ML
INJECTION, SOLUTION INTRAVENOUS; SUBCUTANEOUS
COMMUNITY
Start: 2023-06-17 | End: 2023-06-19 | Stop reason: SDUPTHER

## 2023-06-20 ENCOUNTER — PATIENT MESSAGE (OUTPATIENT)
Dept: ENDOCRINOLOGY | Facility: CLINIC | Age: 23
End: 2023-06-20
Payer: MEDICAID

## 2023-06-20 ENCOUNTER — TELEPHONE (OUTPATIENT)
Dept: ENDOCRINOLOGY | Facility: CLINIC | Age: 23
End: 2023-06-20
Payer: MEDICAID

## 2023-06-21 RX ORDER — INSULIN ASPART 100 [IU]/ML
INJECTION, SOLUTION INTRAVENOUS; SUBCUTANEOUS
Qty: 15 ML | Refills: 6 | Status: SHIPPED | OUTPATIENT
Start: 2023-06-21 | End: 2023-09-21 | Stop reason: SDUPTHER

## 2023-07-07 ENCOUNTER — PATIENT MESSAGE (OUTPATIENT)
Dept: ENDOCRINOLOGY | Facility: CLINIC | Age: 23
End: 2023-07-07
Payer: MEDICAID

## 2023-07-08 ENCOUNTER — PATIENT MESSAGE (OUTPATIENT)
Dept: ADMINISTRATIVE | Facility: HOSPITAL | Age: 23
End: 2023-07-08
Payer: MEDICAID

## 2023-07-08 DIAGNOSIS — E11.9 TYPE 2 DIABETES MELLITUS WITHOUT COMPLICATION, UNSPECIFIED WHETHER LONG TERM INSULIN USE: ICD-10-CM

## 2023-07-10 ENCOUNTER — PATIENT MESSAGE (OUTPATIENT)
Dept: ENDOCRINOLOGY | Facility: CLINIC | Age: 23
End: 2023-07-10
Payer: MEDICAID

## 2023-07-11 ENCOUNTER — PATIENT MESSAGE (OUTPATIENT)
Dept: ENDOCRINOLOGY | Facility: CLINIC | Age: 23
End: 2023-07-11
Payer: MEDICAID

## 2023-08-08 LAB
COMMENTS: ABNORMAL
EST. AVERAGE GLUCOSE BLD GHB EST-MCNC: 240 MG/DL
HBA1C MFR BLD: 10 % (ref 4.8–5.6)

## 2023-08-08 NOTE — PROGRESS NOTES
1st check to see if patient has seen the results.  If not then  CALL patient with results and Document verification.  Schedule follow-up if needed.  [phone]    Abnormal A1c patient needs to follow-up as soon as possible for diabetes management.

## 2023-08-10 ENCOUNTER — OFFICE VISIT (OUTPATIENT)
Dept: ENDOCRINOLOGY | Facility: CLINIC | Age: 23
End: 2023-08-10
Payer: MEDICAID

## 2023-08-10 VITALS
WEIGHT: 140.88 LBS | DIASTOLIC BLOOD PRESSURE: 68 MMHG | SYSTOLIC BLOOD PRESSURE: 118 MMHG | BODY MASS INDEX: 21.35 KG/M2 | HEIGHT: 68 IN | HEART RATE: 119 BPM

## 2023-08-10 DIAGNOSIS — K31.84 GASTROPARESIS: Chronic | ICD-10-CM

## 2023-08-10 DIAGNOSIS — E03.9 HYPOTHYROIDISM, UNSPECIFIED TYPE: ICD-10-CM

## 2023-08-10 DIAGNOSIS — Z96.41 INSULIN PUMP STATUS: ICD-10-CM

## 2023-08-10 DIAGNOSIS — Z46.81 INSULIN PUMP FITTING OR ADJUSTMENT: ICD-10-CM

## 2023-08-10 DIAGNOSIS — E10.65 TYPE 1 DIABETES MELLITUS WITH HYPERGLYCEMIA: Primary | ICD-10-CM

## 2023-08-10 PROCEDURE — 3074F PR MOST RECENT SYSTOLIC BLOOD PRESSURE < 130 MM HG: ICD-10-PCS | Mod: CPTII,,, | Performed by: NURSE PRACTITIONER

## 2023-08-10 PROCEDURE — 99214 PR OFFICE/OUTPT VISIT, EST, LEVL IV, 30-39 MIN: ICD-10-PCS | Mod: 25,S$PBB,, | Performed by: NURSE PRACTITIONER

## 2023-08-10 PROCEDURE — 99214 OFFICE O/P EST MOD 30 MIN: CPT | Mod: 25,S$PBB,, | Performed by: NURSE PRACTITIONER

## 2023-08-10 PROCEDURE — 3008F PR BODY MASS INDEX (BMI) DOCUMENTED: ICD-10-PCS | Mod: CPTII,,, | Performed by: NURSE PRACTITIONER

## 2023-08-10 PROCEDURE — 99999 PR PBB SHADOW E&M-EST. PATIENT-LVL III: CPT | Mod: PBBFAC,,, | Performed by: NURSE PRACTITIONER

## 2023-08-10 PROCEDURE — 3074F SYST BP LT 130 MM HG: CPT | Mod: CPTII,,, | Performed by: NURSE PRACTITIONER

## 2023-08-10 PROCEDURE — 3008F BODY MASS INDEX DOCD: CPT | Mod: CPTII,,, | Performed by: NURSE PRACTITIONER

## 2023-08-10 PROCEDURE — 3078F PR MOST RECENT DIASTOLIC BLOOD PRESSURE < 80 MM HG: ICD-10-PCS | Mod: CPTII,,, | Performed by: NURSE PRACTITIONER

## 2023-08-10 PROCEDURE — 95251 CONT GLUC MNTR ANALYSIS I&R: CPT | Mod: ,,, | Performed by: NURSE PRACTITIONER

## 2023-08-10 PROCEDURE — 99213 OFFICE O/P EST LOW 20 MIN: CPT | Mod: PBBFAC,PO | Performed by: NURSE PRACTITIONER

## 2023-08-10 PROCEDURE — 1159F MED LIST DOCD IN RCRD: CPT | Mod: CPTII,,, | Performed by: NURSE PRACTITIONER

## 2023-08-10 PROCEDURE — 95251 PR GLUCOSE MONITOR, 72 HOUR, PHYS INTERP: ICD-10-PCS | Mod: ,,, | Performed by: NURSE PRACTITIONER

## 2023-08-10 PROCEDURE — 1159F PR MEDICATION LIST DOCUMENTED IN MEDICAL RECORD: ICD-10-PCS | Mod: CPTII,,, | Performed by: NURSE PRACTITIONER

## 2023-08-10 PROCEDURE — 3078F DIAST BP <80 MM HG: CPT | Mod: CPTII,,, | Performed by: NURSE PRACTITIONER

## 2023-08-10 PROCEDURE — 99999 PR PBB SHADOW E&M-EST. PATIENT-LVL III: ICD-10-PCS | Mod: PBBFAC,,, | Performed by: NURSE PRACTITIONER

## 2023-08-10 RX ORDER — INSULIN ASPART 100 [IU]/ML
INJECTION, SOLUTION INTRAVENOUS; SUBCUTANEOUS
Qty: 30 ML | Refills: 12 | Status: SHIPPED | OUTPATIENT
Start: 2023-08-10

## 2023-08-10 NOTE — PROGRESS NOTES
Subjective:       Patient ID: Zoran Corado is a 23 y.o. male.    Chief Complaint: Diabetes    HPI Pt is a 23 y.o. wm  with a diagnosis of Type 1 diabetes mellitus  (2006 age 7 years). iagnosed approximately hypothyroidism, as well as chronic conditions pending review including cystic fibrosis carrier , gastroparesis.   Pt initially dx with Type 1 DM at age 7 years, and CF  (2015). later on. Has used insulin pump but interfered with job.  He has frequent and recurrent hospitalizations for DKA and  Other pertinent medical and social information noted includes, but not limited to: Works in [employer].     Interim Events: Aug 10, 2023: No acute events. States a1c done per Dr. Leonard and was 10.  Pump and sensor downloaded.  Overall readings look much better than an a1c that high.  Has been having some freq mild reccurrent hypoglycemia, but seems isolated to days working outside in heat.  Also states he is running out of insulin--2 vials not enough, He is using closer to 80-90 units a day. Gastroparesis has been better without any severe flares. Currently working at [employer].  And he did not get labs done before this visit.       Current DM meds:   Tandem pump  ~ 100   units a day. (With exacerbations)        Failed DM meds:  none        Back up plan for insulin pump hiatus: 28 units of basal,  5 units with meals plus ss 1:150   Statin: na        Not tolerated statin : none   ACE/ARB:na       Not tolerated ACE/ARB: na   Known Diabetic complications: gastroparesis       march 31, 2023:  Pt returns for routine f/u. Gastroparesis exacerbations have been relatively brief.  Looks much better.  Pump and sensor downloaded.  A couple of days he was having n/v and primarily slept--so those glucoses were somewhat elevated--and he had an infusion site issue and that markedly elevated rreadigns.        Dec 2, 2022:  Pt new to me--needed Friday appt, other provider no longer available.  Pleasant, good historian.  He changed  jobs and restarted pump.  Date is off.  No focal complaints with exception of bloating r/t gastroparesis.   He is/was being worked up for gastric pacemaker at [facility].  He did have a spell in last couple of weeks sensor delayed in shipment so no data available. He has had frequent DKA--partially r/t gastroparesis, appears a lot was related to cannabinoid hyperemesis syndrome, and a couple of times missed insulin when not on pump.  He is has since stopped marijuana with job, and that has been beneficial.  He is also missing levo, and when takes, takes with other meds.       Sensor Interpretation       Prominent Theme/Finding:  Some marked elevations r/t tubing malfunction.  Otherdays freq mild hyperglycemia r/t working outside. Other days are fairly decent.         Review of Systems   Constitutional:  Negative for appetite change and unexpected weight change.   HENT:  Negative for hearing loss and trouble swallowing.    Eyes:  Negative for photophobia and visual disturbance.   Respiratory:  Negative for cough and shortness of breath.    Cardiovascular:  Negative for chest pain and leg swelling.   Gastrointestinal:  Negative for abdominal distention, abdominal pain, constipation and diarrhea.   Genitourinary:  Negative for difficulty urinating and urgency.   Musculoskeletal:  Negative for arthralgias and myalgias.   Neurological:  Negative for weakness and numbness.   Psychiatric/Behavioral:  Negative for sleep disturbance. The patient is not nervous/anxious.          Objective:      Physical Exam  Constitutional:       Appearance: Normal appearance.   HENT:      Head: Normocephalic and atraumatic.      Nose: Nose normal.      Mouth/Throat:      Mouth: Mucous membranes are moist.      Pharynx: Oropharynx is clear.   Eyes:      Extraocular Movements: Extraocular movements intact.      Conjunctiva/sclera: Conjunctivae normal.      Pupils: Pupils are equal, round, and reactive to light.   Neck:      Vascular: No carotid  "bruit.   Cardiovascular:      Rate and Rhythm: Normal rate and regular rhythm.      Pulses: Normal pulses.      Heart sounds: Normal heart sounds.   Pulmonary:      Effort: Pulmonary effort is normal.      Breath sounds: Normal breath sounds.   Musculoskeletal:         General: Normal range of motion.      Cervical back: Normal range of motion and neck supple.      Right lower leg: No edema.      Left lower leg: No edema.      Comments: Deferred Feet: no open wounds or calluses. GOod pedal care. Pedal pulses +2 bilaterally   Sensation via monofilament intact.    Lymphadenopathy:      Cervical: No cervical adenopathy.   Skin:     General: Skin is warm and dry.   Neurological:      General: No focal deficit present.      Mental Status: He is alert and oriented to person, place, and time.   Psychiatric:         Mood and Affect: Mood normal.         Behavior: Behavior normal.         Thought Content: Thought content normal.         Judgment: Judgment normal.             /68 (BP Location: Right arm, Patient Position: Sitting, BP Method: Large (Manual))   Pulse (!) 119   Ht 5' 8" (1.727 m)   Wt 63.9 kg (140 lb 14 oz)   BMI 21.42 kg/m²     Hemoglobin A1C   Date Value Ref Range Status   07/08/2022 9.1 (H) 0.0 - 5.6 % Final     Comment:     Reference Interval:  5.0 - 5.6 Normal   5.7 - 6.4 High Risk   > 6.5 Diabetic      Hgb A1c results are standardized based on the (NGSP) National   Glycohemoglobin Standardization Program.      Hemoglobin A1C levels are related to mean serum/plasma glucose   during the preceding 2-3 months.        04/11/2022 7.1 (H) 4.0 - 5.6 % Final     Comment:     ADA Screening Guidelines:  5.7-6.4%  Consistent with prediabetes  >or=6.5%  Consistent with diabetes    High levels of fetal hemoglobin interfere with the HbA1C  assay. Heterozygous hemoglobin variants (HbS, HgC, etc)do  not significantly interfere with this assay.   However, presence of multiple variants may affect accuracy.   " "  12/02/2021 8.6 (H) 4.0 - 5.6 % Final     Comment:     ADA Screening Guidelines:  5.7-6.4%  Consistent with prediabetes  >or=6.5%  Consistent with diabetes    High levels of fetal hemoglobin interfere with the HbA1C  assay. Heterozygous hemoglobin variants (HbS, HgC, etc)do  not significantly interfere with this assay.   However, presence of multiple variants may affect accuracy.         Chemistry        Component Value Date/Time     (L) 04/07/2023 0920     (L) 12/28/2022 1220    K 4.1 04/07/2023 0920    K 4.1 12/28/2022 1220    CL 95 12/28/2022 1220    CO2 25 04/07/2023 0920    CO2 32 (H) 12/28/2022 1220    BUN 30 (H) 04/07/2023 0920    BUN 23 (H) 12/28/2022 1220    CREATININE 1.82 (H) 04/07/2023 0920    CREATININE 0.81 12/28/2022 1220     (H) 12/28/2022 1220    GLU >500 09/09/2018 0433        Component Value Date/Time    CALCIUM 10.9 (H) 04/07/2023 0920    CALCIUM 9.7 12/28/2022 1220    ALKPHOS 170 (H) 04/07/2023 0920    ALKPHOS 129 12/28/2022 1220    AST 13 04/07/2023 0920    AST 33 12/28/2022 1220    AST 30 04/12/2016 1113    ALT 8 04/07/2023 0920    ALT 38 12/28/2022 1220    BILITOT 0.6 04/07/2023 0920    BILITOT 0.9 12/28/2022 1220    ESTGFRAFRICA >60 07/08/2022 1342    EGFRNONAA >60 07/08/2022 1342          Lab Results   Component Value Date    CHOL 221 (H) 07/28/2017     Lab Results   Component Value Date    HDL 52 07/28/2017     Lab Results   Component Value Date    LDLCALC 138.2 07/28/2017     Lab Results   Component Value Date    TRIG 154 (H) 07/28/2017     Lab Results   Component Value Date    CHOLHDL 23.5 07/28/2017     Lab Results   Component Value Date    MICALBCREAT Unable to calculate 12/02/2021     Lab Results   Component Value Date    TSH 6.777 (H) 04/11/2022     No results found for: "[id number]"    Assessment:     1. Type 1 diabetes mellitus with hyperglycemia  insulin aspart U-100 (NOVOLOG) 100 unit/mL injection      2. Hypothyroidism, unspecified type        3. " Gastroparesis        4. Insulin pump fitting or adjustment        5. Insulin pump status                Plan:     Reinforced levo--qam--empty stomach, water only ideally an hour before eating. On levo 1.75    Would likely do better bolusing after meals--if he remembers.       No changes today. Too many events skewing glucose patterns for evaluation     ORDERS 08/10/2023     Labs next week in [place]     3 mo with  with me--no labs

## 2023-09-07 ENCOUNTER — TELEPHONE (OUTPATIENT)
Dept: FAMILY MEDICINE | Facility: CLINIC | Age: 23
End: 2023-09-07
Payer: MEDICAID

## 2023-09-07 NOTE — TELEPHONE ENCOUNTER
----- Message from Anahi Garibay sent at 9/7/2023 12:30 PM CDT -----  Contact: Jignesh  Type:  Sooner Appointment Request    Caller is requesting a sooner appointment.  Caller declined first available appointment listed below.  Caller will not accept being placed on the waitlist and is requesting a message be sent to doctor.  Name of Caller: Jignesh Padron  When is the first available appointment? N/A  Symptoms: Hosp discharge 9/7 need 1 wk - 2 wk   Would the patient rather a call back or a response via [facility]? Call   Best Call Back Number: [phone]  Please call to advise/schedule... thank you..

## 2023-09-21 ENCOUNTER — OFFICE VISIT (OUTPATIENT)
Dept: FAMILY MEDICINE | Facility: CLINIC | Age: 23
End: 2023-09-21
Payer: MEDICAID

## 2023-09-21 VITALS
HEIGHT: 68 IN | DIASTOLIC BLOOD PRESSURE: 60 MMHG | WEIGHT: 132.63 LBS | BODY MASS INDEX: 20.1 KG/M2 | SYSTOLIC BLOOD PRESSURE: 112 MMHG | OXYGEN SATURATION: 98 % | HEART RATE: 125 BPM

## 2023-09-21 DIAGNOSIS — Z13.220 ENCOUNTER FOR LIPID SCREENING FOR CARDIOVASCULAR DISEASE: ICD-10-CM

## 2023-09-21 DIAGNOSIS — E10.65 TYPE 1 DIABETES MELLITUS WITH HYPERGLYCEMIA: ICD-10-CM

## 2023-09-21 DIAGNOSIS — Z96.41 INSULIN PUMP STATUS: ICD-10-CM

## 2023-09-21 DIAGNOSIS — E03.9 HYPOTHYROIDISM, UNSPECIFIED TYPE: ICD-10-CM

## 2023-09-21 DIAGNOSIS — I15.2 HYPERTENSION ASSOCIATED WITH DIABETES: ICD-10-CM

## 2023-09-21 DIAGNOSIS — E11.59 HYPERTENSION ASSOCIATED WITH DIABETES: ICD-10-CM

## 2023-09-21 DIAGNOSIS — R00.0 TACHYCARDIA: ICD-10-CM

## 2023-09-21 DIAGNOSIS — Z13.6 ENCOUNTER FOR LIPID SCREENING FOR CARDIOVASCULAR DISEASE: ICD-10-CM

## 2023-09-21 DIAGNOSIS — Z09 HOSPITAL DISCHARGE FOLLOW-UP: Primary | ICD-10-CM

## 2023-09-21 DIAGNOSIS — Z79.899 ENCOUNTER FOR LONG-TERM (CURRENT) USE OF MEDICATIONS: ICD-10-CM

## 2023-09-21 PROCEDURE — 3008F PR BODY MASS INDEX (BMI) DOCUMENTED: ICD-10-PCS | Mod: CPTII,,, | Performed by: FAMILY MEDICINE

## 2023-09-21 PROCEDURE — 93005 ELECTROCARDIOGRAM TRACING: CPT | Mod: PBBFAC,PO | Performed by: INTERNAL MEDICINE

## 2023-09-21 PROCEDURE — 93010 EKG 12-LEAD: ICD-10-PCS | Mod: S$PBB,,, | Performed by: INTERNAL MEDICINE

## 2023-09-21 PROCEDURE — 1159F MED LIST DOCD IN RCRD: CPT | Mod: CPTII,,, | Performed by: FAMILY MEDICINE

## 2023-09-21 PROCEDURE — 3074F SYST BP LT 130 MM HG: CPT | Mod: CPTII,,, | Performed by: FAMILY MEDICINE

## 2023-09-21 PROCEDURE — 3078F DIAST BP <80 MM HG: CPT | Mod: CPTII,,, | Performed by: FAMILY MEDICINE

## 2023-09-21 PROCEDURE — 3008F BODY MASS INDEX DOCD: CPT | Mod: CPTII,,, | Performed by: FAMILY MEDICINE

## 2023-09-21 PROCEDURE — 1159F PR MEDICATION LIST DOCUMENTED IN MEDICAL RECORD: ICD-10-PCS | Mod: CPTII,,, | Performed by: FAMILY MEDICINE

## 2023-09-21 PROCEDURE — 99215 OFFICE O/P EST HI 40 MIN: CPT | Mod: PBBFAC,PO | Performed by: FAMILY MEDICINE

## 2023-09-21 PROCEDURE — 99999 PR PBB SHADOW E&M-EST. PATIENT-LVL V: ICD-10-PCS | Mod: PBBFAC,,, | Performed by: FAMILY MEDICINE

## 2023-09-21 PROCEDURE — 1160F PR REVIEW ALL MEDS BY PRESCRIBER/CLIN PHARMACIST DOCUMENTED: ICD-10-PCS | Mod: CPTII,,, | Performed by: FAMILY MEDICINE

## 2023-09-21 PROCEDURE — 1160F RVW MEDS BY RX/DR IN RCRD: CPT | Mod: CPTII,,, | Performed by: FAMILY MEDICINE

## 2023-09-21 PROCEDURE — 4010F ACE/ARB THERAPY RXD/TAKEN: CPT | Mod: CPTII,,, | Performed by: FAMILY MEDICINE

## 2023-09-21 PROCEDURE — 99215 PR OFFICE/OUTPT VISIT, EST, LEVL V, 40-54 MIN: ICD-10-PCS | Mod: S$PBB,,, | Performed by: FAMILY MEDICINE

## 2023-09-21 PROCEDURE — 99215 OFFICE O/P EST HI 40 MIN: CPT | Mod: S$PBB,,, | Performed by: FAMILY MEDICINE

## 2023-09-21 PROCEDURE — 93010 ELECTROCARDIOGRAM REPORT: CPT | Mod: S$PBB,,, | Performed by: INTERNAL MEDICINE

## 2023-09-21 PROCEDURE — 3074F PR MOST RECENT SYSTOLIC BLOOD PRESSURE < 130 MM HG: ICD-10-PCS | Mod: CPTII,,, | Performed by: FAMILY MEDICINE

## 2023-09-21 PROCEDURE — 4010F PR ACE/ARB THEARPY RXD/TAKEN: ICD-10-PCS | Mod: CPTII,,, | Performed by: FAMILY MEDICINE

## 2023-09-21 PROCEDURE — 3078F PR MOST RECENT DIASTOLIC BLOOD PRESSURE < 80 MM HG: ICD-10-PCS | Mod: CPTII,,, | Performed by: FAMILY MEDICINE

## 2023-09-21 PROCEDURE — 99999 PR PBB SHADOW E&M-EST. PATIENT-LVL V: CPT | Mod: PBBFAC,,, | Performed by: FAMILY MEDICINE

## 2023-09-21 RX ORDER — LOSARTAN POTASSIUM 50 MG/1
50 TABLET ORAL DAILY
Qty: 90 TABLET | Refills: 4 | Status: SHIPPED | OUTPATIENT
Start: 2023-09-21 | End: 2024-03-01 | Stop reason: SINTOL

## 2023-09-21 RX ORDER — BUSPIRONE HYDROCHLORIDE 15 MG/1
7.5 TABLET ORAL 4 TIMES DAILY PRN
COMMUNITY
Start: 2023-05-22 | End: 2024-03-01

## 2023-09-21 RX ORDER — ONDANSETRON 8 MG/1
1 TABLET, ORALLY DISINTEGRATING ORAL EVERY 8 HOURS PRN
COMMUNITY
Start: 2023-09-07

## 2023-09-21 RX ORDER — LOSARTAN POTASSIUM 50 MG/1
50 TABLET ORAL
COMMUNITY
Start: 2023-09-08 | End: 2023-09-21 | Stop reason: SDUPTHER

## 2023-09-21 NOTE — PATIENT INSTRUCTIONS
Follow up if symptoms worsen or fail to improve.     Dear patient,   As a result of recent federal legislation (The Federal Cures Act), you may receive lab or pathology results from your visit in your [facility] account before your physician is able to contact you. Your physician or their representative will relay the results to you with their recommendations at their soonest availability.     If no improvement in symptoms or symptoms worsen, please be advised to call MD, follow-up at clinic and/or go to ER if becomes severe.    Nico Leonard M.D.        We Offer TELEHEALTH & Same Day Appointments!   Book your Telehealth appointment with me through my nurse or   Clinic appointments on [facility]!    [address] [place] [address]    Office: [phone]   FAX: [phone]    Check out my Facebook Page and Follow Me at: [email or web address]    Check out my website at [employer] by clicking on: [email or web address]    To Schedule appointments online, go to [email or web address]: [email or web address]

## 2023-09-21 NOTE — PROGRESS NOTES
PLAN:      Problem List Items Addressed This Visit       Type 1 diabetes mellitus (Chronic)     Follow-up closely with diabetes management. We will plan to monitor hemoglobin A1c at designated intervals 3 to 6 months.  I recommend ongoing Education for diabetic diet and exercise protocol.  We will continue to monitor for side effects.    Please be advised of symptoms to monitor for and to notify me immediately if persistent or worsening.  Follow up with Ophthalmology/Optometry and Podiatry at least annually.           Relevant Orders    CBC Without Differential    Comprehensive Metabolic Panel    TSH    Hemoglobin A1C    Lipid Panel    Encounter for long-term (current) use of medications (Chronic)     Complete history and physical was completed today.  Complete and thorough medication reconciliation was performed.  Discussed risks and benefits of medications.  Advised patient on orders and health maintenance.  We discussed old records and old labs if available.  Will request any records not available through epic.  Continue current medications listed on your summary sheet.           Relevant Orders    CBC Without Differential    Comprehensive Metabolic Panel    TSH    Hemoglobin A1C    Lipid Panel    Tachycardia (Chronic)     Referral to Cardiology for further evaluation and treatment.  ER precautions for severe symptoms.         Relevant Orders    IN OFFICE EKG 12-LEAD (to [place]) (Completed)    Ambulatory referral/consult to Cardiology    Insulin pump status (Chronic)     Defer insulin pump settings to diabetes management.         Hypertension associated with diabetes (Chronic)     Continue losartan.  Counseled on importance of hypertension disease course, I recommend ongoing Education for DASH-diet and exercise.  Counseled on medication regimen importance of treating high blood pressure.  Please be advised of risk of untreated blood pressure as discussed.  Please advised of ER precautions were given for symptoms of  hypertensive urgency and emergency.           Relevant Medications    losartan (COZAAR) 50 MG tablet    Hospital discharge follow-up - Primary     Transitional Care Note    Family and/or Caretaker present at visit?  No.  Diagnostic tests reviewed/disposition: I have reviewed all completed as well as pending diagnostic tests at the time of discharge.  Disease/illness education: DKA  Home health/community services discussion/referrals: Patient does not have home health established from hospital visit.  They do not need home health.  If needed, we will set up home health for the patient.   Establishment or re-establishment of referral orders for community resources: No other necessary community resources.   Discussion with other health care providers: No discussion with other health care providers necessary.                  Relevant Orders    CBC Without Differential    Comprehensive Metabolic Panel    TSH    Hemoglobin A1C    Lipid Panel     Other Visit Diagnoses       Encounter for lipid screening for cardiovascular disease        Relevant Orders    Lipid Panel          Future Appointments       Date Provider Specialty Appt Notes    9/22/2023  Lab     11/7/2023 Scott Orozco MD Cardiology .    11/16/2023 Cristina Mccormick APRN,ANP-C Endocrinology 3 mth f/u [facility]           Medication Management for assessment above:   Medication List with Changes/Refills   Current Medications    BUSPIRONE (BUSPAR) 15 MG TABLET    Take 7.5 mg by mouth 4 (four) times daily as needed.    FAMOTIDINE (PEPCID) 40 MG TABLET    Take 1 tablet (40 mg total) by mouth every evening. , about an hour before bedtime.    GI COCKTAIL ANTAC/DICYC/LIDOC    Take 10 mLs by mouth every 4 to 6 hours as needed (nausea, chest discomfort).    INSULIN ASPART U-100 (NOVOLOG) 100 UNIT/ML INJECTION    Pt uses 100 units a day via insulin pump    LEVOTHYROXINE (SYNTHROID, LEVOTHROID) 175 MCG TABLET    Take 1 tablet by mouth 6 days per week and 1.5 tablets on  day 7.    MISOPROSTOL (CYTOTEC) 100 MCG TAB    Take 1 tablet (100 mcg total) by mouth 4 (four) times daily before meals and nightly.    ONDANSETRON (ZOFRAN-ODT) 8 MG TBDL    Take 1 tablet by mouth every 8 (eight) hours as needed.    PROCHLORPERAZINE (COMPAZINE) 25 MG SUPPOSITORY    Place 1 suppository (25 mg total) rectally every 12 (twelve) hours as needed for Nausea.    PROMETHAZINE (PHENERGAN) 12.5 MG TAB    Take 1 tablet (12.5 mg total) by mouth every 6 (six) hours as needed (nausea and vomiting).    SUCRALFATE (CARAFATE) 1 GRAM TABLET    Take 1 tablet (1 g total) by mouth 4 (four) times daily before meals and nightly.   Changed and/or Refilled Medications    Modified Medication Previous Medication    LOSARTAN (COZAAR) 50 MG TABLET losartan (COZAAR) 50 MG tablet       Take 1 tablet (50 mg total) by mouth once daily.    Take 50 mg by mouth.   Discontinued Medications    NOVOLOG FLEXPEN U-100 INSULIN 100 UNIT/ML (3 ML) INPN PEN    Pt uses pens as back up in event of pump hiatus--Pt uses 5 units with meals plus ss or 50 units a day max.    OMEPRAZOLE (PRILOSEC) 40 MG CAPSULE    Take 1 capsule (40 mg total) by mouth once daily.    PROCHLORPERAZINE (COMPAZINE) 25 MG SUPPOSITORY    Place 1 suppository (25 mg total) rectally 2 (two) times daily.       Nico Leonard M.D.  ==========================================================================  Subjective:   Patient ID: Zoran Corado is a 23 y.o. male.  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   Chief Complaint:  "Hospital Follow Up (DKA and Hypertension. [facility])      Problem List Items Addressed This Visit       Type 1 diabetes mellitus (Chronic)    Overview     Chronic.  Intermittent control.  Patient with recent hospitalization for DKA.  Patient was discharged after being stabilized with IV fluids adjusting insulin levels.  Patient follows withDM MAJOR Rodriguez,ANP-C  He reports that his blood sugar has been stable since discharge.    Patient does have an insulin pump.    Diabetes Medications               insulin aspart U-100 (NOVOLOG) 100 unit/mL injection Pt uses 100 units a day via insulin pump              Diabetes Management Status    Statin: Not taking  ACE/ARB: Taking    Screening or Prevention Patient's value Goal Complete/Controlled?   HgA1C Testing and Control   Lab Results   Component Value Date    HGBA1C 9.0 (H) 09/05/2023      Annually/Less than 8% No   Lipid profile Most Recent Lipid Panel Health Maintenance Topic Completion: Not Found Annually No   LDL control Lab Results   Component Value Date    LDLCALC 138.2 07/28/2017    Annually/Less than 100 mg/dl  No   Nephropathy screening Lab Results   Component Value Date    LABMICR 56.0 12/02/2021     Lab Results   Component Value Date    PROTEINUA Trace (A) 08/14/2023     No results found for: "UTPCR"   Annually Yes   Blood pressure BP Readings from Last 1 Encounters:   09/21/23 112/60    Less than 140/90 Yes   Dilated retinal exam : 12/05/2016 Annually No   Foot exam   : 04/11/2022 Annually No              Current Assessment & Plan     Follow-up closely with diabetes management. We will plan to monitor hemoglobin A1c at designated intervals 3 to 6 months.  I recommend ongoing Education for diabetic diet and exercise protocol.  We will continue to monitor for side effects.    Please be advised of symptoms to monitor for and to notify me immediately if persistent or worsening.  Follow up with Ophthalmology/Optometry and Podiatry at least annually.     "       Encounter for long-term (current) use of medications (Chronic)    Overview     CHRONIC. Stable. Compliant with medications for managed conditions. See medication list. No SE reported.   Routine lab analysis is being monitored. Refills were addressed.  Lab Results   Component Value Date    WBC 11.79 08/14/2023    HGB 16.0 08/14/2023    HCT 42.7 08/14/2023    MCV 79 (L) 08/14/2023     08/14/2023       Chemistry        Component Value Date/Time     09/07/2023 0456     (L) 08/14/2023 0912    K 3.8 09/07/2023 0456    K 2.4 (LL) 08/14/2023 0912    CL 74 (LL) 08/14/2023 0912    CO2 27 09/07/2023 0456    CO2 40 (H) 08/14/2023 0912    BUN 10 09/07/2023 0456    BUN 22 (H) 08/14/2023 0912    CREATININE 0.83 (L) 09/07/2023 0456    CREATININE 0.94 08/14/2023 0912     (H) 08/14/2023 0912    GLU >500 09/09/2018 0433        Component Value Date/Time    CALCIUM 8.9 09/07/2023 0456    CALCIUM 9.4 08/14/2023 0912    ALKPHOS 126 (H) 09/05/2023 0940    ALKPHOS 115 08/14/2023 0912    AST 30 09/05/2023 0940    AST 29 08/14/2023 0912    AST 30 04/12/2016 1113    ALT 58 (H) 09/05/2023 0940    ALT 24 08/14/2023 0912    BILITOT 0.4 09/05/2023 0940    BILITOT 1.3 08/14/2023 0912    ESTGFRAFRICA >60 07/08/2022 1342    EGFRNONAA >60 07/08/2022 1342          Lab Results   Component Value Date    TSH 6.777 (H) 04/11/2022    FREET4 0.76 04/11/2022            Current Assessment & Plan     Complete history and physical was completed today.  Complete and thorough medication reconciliation was performed.  Discussed risks and benefits of medications.  Advised patient on orders and health maintenance.  We discussed old records and old labs if available.  Will request any records not available through epic.  Continue current medications listed on your summary sheet.           Tachycardia (Chronic)    Overview     Subacute.  Patient reports he has been having elevated heart rate for the last few months.  Patient denies any  recreational drug use or change in medications.  He was recently hospitalized for DKA.  Patient reports no chest pain shortness of breath but he does feel his heart rate racing at times.    Results for orders placed or performed in visit on 09/21/23   IN OFFICE EKG 12-LEAD (to [place])    Collection Time: 09/21/23  3:42 PM    Narrative    Test Reason : R00.0,    Vent. Rate : 113 BPM     Atrial Rate : 113 BPM     P-R Int : 130 ms          QRS Dur : 092 ms      QT Int : 304 ms       P-R-T Axes : 052 054 017 degrees     QTc Int : 416 ms    Sinus tachycardia  Possible Left atrial enlargement  Nonspecific T wave abnormality  Abnormal ECG  When compared with ECG of 14-AUG-2023 08:31,  Non-specific change in ST segment in Inferior leads  Confirmed by DARIO STANLEY MD ([id number]) on 9/21/2023 6:12:33 PM    Referred By:  MARLEE DAVE           Confirmed By:DARIO STANLEY MD              Current Assessment & Plan     Referral to Cardiology for further evaluation and treatment.  ER precautions for severe symptoms.         Insulin pump status (Chronic)    Overview     Managed by MAJOR Olivares,ANP-C           Current Assessment & Plan     Defer insulin pump settings to diabetes management.         Hypertension associated with diabetes (Chronic)    Overview     CHRONIC.  September 2023: Patient was recently started on losartan during the hospital stay due to elevated blood pressure.  Patient with comorbid diabetes.. BP Reviewed.  Compliant with BP medications. No SE reported.   (-) CP, SOB, palpitations, dizziness, lightheadedness, HA, arm numbness, tingling or weakness, syncope.  Creatinine   Date Value Ref Range Status   09/07/2023 0.83 (L) 0.90 - 1.30 mg/dL Final   08/14/2023 0.94 0.50 - 1.40 mg/dL Final            Current Assessment & Plan     Continue losartan.  Counseled on importance of hypertension disease course, I recommend ongoing Education for DASH-diet and exercise.  Counseled on medication regimen importance of  treating high blood pressure.  Please be advised of risk of untreated blood pressure as discussed.  Please advised of ER precautions were given for symptoms of hypertensive urgency and emergency.           Hospital discharge follow-up - Primary    Overview     [facility] DISCHARGE SUMMARY    Patient ID:  Zoran Corado  [id number]  23 y.o.  2000    Admit Date:   9/5/2023 9:35 AM    Discharge Date:   Discharge Today: 9/7/2023    Admitting Physician:   Benito Salas DO     Discharge Physician:   ERIN PHAN NP    Consultants/Treatment Team:  Consultants   Provider Service Role Specialty   Teresa, MD Damian -- Consulting Physician Endocrinology   Erin Phan NP -- Nurse Practitioner Nurse Practitioner Acute Care       Reason for Admission/Admission Diagnoses:   Present on Admission:   Diabetic ketoacidosis without coma associated with type 1 diabetes mellitus (HCC)   Systemic inflammatory response syndrome (SIRS) due to non-infectious process with acute organ dysfunction (HCC)   JIM (acute kidney injury) (HCC)   Hypernatremia   Thrombocytosis   Hyperkalemia   Gastroparesis    Discharge Diagnoses:   Active Hospital Problems   Diagnosis Date Noted    Diabetic ketoacidosis without coma associated with type 1 diabetes mellitus (HCC) 09/05/2023    Systemic inflammatory response syndrome (SIRS) due to non-infectious process with acute organ dysfunction (HCC) 09/05/2023    JIM (acute kidney injury) (HCC) 09/05/2023    Hypernatremia 09/05/2023    Thrombocytosis 09/05/2023    Hyperkalemia 09/05/2023    Gastroparesis 09/05/2023     Resolved Hospital Problems     History of Present Illness:   Zoran Corado is a 22 y/o male who presented to the ED with hyperglycemia. The hx is obtained from the chart as the pt would not cooperate with the interview and refused to talk or answer any of my questions. Per the primary nurse, this has been his behavior and is typical of him. According to the ED notes, he was brought in by a friend due to  hyperglycemia and he woke up vomiting and his insulin pump ran out during the night. He was found to be in DKA with an admitting glucose of >1500, pH 7.18, A1C 9.0, lactic acid 3.88, initial CO2 of 14, AG 35, WBC count 25.3, plts 562, BUN 46 with a creatinine of 2.65 and a potassium of 6.6. He was given a 2 L bolus of normal saline, 1 g of calcium gluconate IV push, 10 units of regular insulin IV as well as an amp of bicarb and started on insulin infusion. He was seen and evaluated by endocrinology, Dr. Damian Moore and orders noted. Associated symptoms include nausea and vomiting for which he has been given Zofran and Benadryl with improvement in his symptoms. He was apparently diagnosed with type 1 diabetes at age 7 and is managed by Dr. Mccormick, an endocrinologist in [place]. He is admitted to [facility] for continued treatment of his DKA.    Hospital Course and Treatment:   DKA  Type 1 diabetes mellitus  -Endocrinology followed along, recommending to resume his insulin pump at discharge. Dr. Moore has cleared the patient for discharge.  -Patient's insulin pump ran out during the night and glucose on arrival was greater than 1500.  -Accuchecks were monitored AC/HS. S/P insulin infusion.   -IV fluids were maintained by endocrinology  -Initial CO2 14, anion gap 35. DKA now resolved but his blood sugars have improved. On Lantus, meal time insulin and SSI while inpatient.   -Last A1c 9.1 from 2022.  She is maintained on a tandem pump and uses Dexcom in the outpatient setting.  -A1C this admission 9.0.     Multiple electrolyte derangements secondary to DKA  Hyperkalemia-resolved  Hypernatremia-worsening  JIM-resolved  -Creatinine 0.83, BUN 10  -Potassium 3.8   -Sodium 143     SIRS criteria POA; no source of infection identified  Lactic acidosis  -All secondary to DKA, monitor labs closely and trend LA levels.  Initial lactic 3.88, improved to 2.00.  -No indication for antibiotics at this time. WBC count improved to 10.2;  down from 25.3.   -For completeness, blood cultures x2 sets were collected and are negative. Afebrile.      Intractable nausea and vomiting secondary to DKA-resolved    Elevated blood pressure readings  -No formal diagnosis of hypertension prior to this admission however his blood pressures have remained elevated and he was started on losartan which he will continue at discharge. I have advised that he keep a log of his blood pressures and record them, and bring his log with him to his PCP for review and further management.    He is hemodynamically stable, clinically improved and will be discharged home today. He has been cleared for discharge by endocrinology with outpatient follow-up arranged. He has been advised to follow-up with his PCP and endocrinology after discharge and resume his insulin pump. I have explained the discharge instructions to the patient, all questions have been addressed.    I have discussed the case and discharge plan with Dr. Alverto Salas DO.    Admission Information   Date & Time  9/5/2023 Provider  Benito Salas DO Department  [facility]. Phone  [phone]           I discussed with the patient the disease process and treatment.     I have personally seen and examined the patient, Zoran Corado, in a face to face encounter on the date of discharge.     He is cleared for discharge with instructions to follow up as directed.   Total time in the care and discharge planning of this patient was greater than 30 minutes.    Significant Diagnostic Studies:  Recent Imaging and Procedure Results   None       Surgical Procedures during this visit:    Patient's Condition On Discharge:   Stable    Physical Exam  Vitals and nursing note reviewed.   Constitutional:   General: He is not in acute distress.  Appearance: Normal appearance. He is not ill-appearing, toxic-appearing or diaphoretic.   HENT:   Head: Normocephalic and atraumatic.   Nose: Nose normal.    Mouth/Throat:   Mouth: Mucous membranes are moist.   Eyes:   Pupils: Pupils are equal, round, and reactive to light.   Cardiovascular:   Rate and Rhythm: Normal rate and regular rhythm.   Pulses: Normal pulses.   Heart sounds: Normal heart sounds.   Pulmonary:   Effort: Pulmonary effort is normal. No respiratory distress.   Breath sounds: Normal breath sounds. No wheezing or rales.   Abdominal:   General: Abdomen is flat. Bowel sounds are normal. There is no distension.   Palpations: Abdomen is soft.   Tenderness: There is no abdominal tenderness. There is no guarding.   Musculoskeletal:   General: Normal range of motion.   Cervical back: Normal range of motion and neck supple.   Skin:  General: Skin is warm and dry.   Capillary Refill: Capillary refill takes less than 2 seconds.   Neurological:   General: No focal deficit present.   Mental Status: He is alert and oriented to person, place, and time. Mental status is at baseline.   Psychiatric:   Mood and Affect: Mood normal.   Behavior: Behavior normal.     Discharge Disposition:   Order for Discharge (From admission, onward)   Start Ordered   09/07/23 1133 Discharge Disposition to: Home or Self Care Once   Expected Discharge Date: 09/07/23     Question: Discharge Disposition to Answer: Home or Self Care   09/07/23 1133   09/07/23 0000 Follow-up with PCP Schedule for: 1; Weeks   Question Answer Comment   Schedule for 1   when Weeks     09/07/23 1133             Discharge Orders:  Follow-up Information   Rosy Lehman MD. Schedule an appointment as soon as possible for a visit in 1 week(s).   Specialty: Pediatrics  Contact information:  [address] [id number] [phone]        Damian Moore MD. Schedule an appointment as soon as possible for a visit.   Specialty: Endocrinology  Contact information:  [address]  [place] [address] [phone]                Future Appointments:    Immunizations Administered  for This Admission   No immunizations given this admission.         Medication List     START taking these medications   losartan 50 MG Tab tablet  Commonly known as: COZAAR  Take 1 tablet (50 mg total) by mouth daily  Start taking on: September 8, 2023      CHANGE how you take these medications   ondansetron 8 MG Tbdi disintegrating tablet  Commonly known as: ZOFRAN-ODT  Take 1 tablet (8 mg total) by mouth every 8 (eight) hours as needed for Nausea  What changed:   · medication strength  · how much to take  · Another medication with the same name was removed. Continue taking this medication, and follow the directions you see here.      STOP taking these medications   blood sugar diagnostic Strp test strip    blood-glucose meter Kit kit    gabapentin 300 MG Cap capsule  Commonly known as: NEURONTIN    ibuprofen 600 MG Tab tablet  Commonly known as: ADVIL    insulin aspart U-100 100 unit/mL (3 mL) Inpn  Commonly known as: NOVOLOG FLEXPEN    lancets Misc    levothyroxine 175 MCG Tab tablet  Commonly known as: SYNTHROID        Where to Get Your Medications     These medications were sent to [facility] [address] [facility] [address] [address] [facility] [address] [address]   Phone: [phone]   · losartan 50 MG Tab tablet  · ondansetron 8 MG Tbdi disintegrating tablet      Discharge Orders   Future Labs/Procedures Expected by Expires   Activity as tolerated As directed   Diet (Select type) Consistent Carb 5 (75g/meal, 1700cal) As directed   Questions:   Diet Type: Consistent Carb 5 (75g/meal, 1700cal)   Restriction(s):   Solid Consistency:   Liquid Consistency:   Sodium Restriction:   Fluid restriction:   Follow-up with PCP Schedule for: 1; Weeks As directed   Questions:   Schedule for: 1   when: Bernie ROGERS NP  Hospital Medicine            Current Assessment & Plan     Transitional Care Note    Family and/or Caretaker present at visit?  No.  Diagnostic tests reviewed/disposition: I have  reviewed all completed as well as pending diagnostic tests at the time of discharge.  Disease/illness education: DKA  Home health/community services discussion/referrals: Patient does not have home health established from hospital visit.  They do not need home health.  If needed, we will set up home health for the patient.   Establishment or re-establishment of referral orders for community resources: No other necessary community resources.   Discussion with other health care providers: No discussion with other health care providers necessary.                   Other Visit Diagnoses       Encounter for lipid screening for cardiovascular disease                 Review of patient's allergies indicates:  No Known Allergies  Current Outpatient Medications   Medication Instructions    busPIRone (BUSPAR) 7.5 mg, Oral, 4 times daily PRN    famotidine (PEPCID) 40 mg, Oral, Nightly, , about an hour before bedtime.    GI cocktail antac/dicyc/lidoc 10 mLs, Oral, Every 4-6 hours PRN    insulin aspart U-100 (NOVOLOG) 100 unit/mL injection Pt uses 100 units a day via insulin pump    levothyroxine (SYNTHROID, LEVOTHROID) 175 MCG tablet Take 1 tablet by mouth 6 days per week and 1.5 tablets on day 7.    losartan (COZAAR) 50 mg, Oral, Daily    miSOPROStoL (CYTOTEC) 100 mcg, Oral, Before meals & nightly    ondansetron (ZOFRAN-ODT) 8 MG TbDL 1 tablet, Oral, Every 8 hours PRN    prochlorperazine (COMPAZINE) 25 mg, Rectal, Every 12 hours PRN    promethazine (PHENERGAN) 12.5 mg, Oral, Every 6 hours PRN    sucralfate (CARAFATE) 1 g, Oral, Before meals & nightly      I have reviewed the PMH, social history, FamilyHx, surgical history, allergies and medications documented / confirmed by the patient at the time of this visit.  Review of Systems   Constitutional:  Positive for fatigue. Negative for chills, fever and unexpected weight change.   HENT:  Negative for ear pain and sore throat.    Eyes:  Negative for redness and visual disturbance.  "  Respiratory:  Negative for cough and shortness of breath.    Cardiovascular:  Negative for chest pain and palpitations.   Gastrointestinal:  Negative for nausea and vomiting.   Endocrine: Negative for cold intolerance and heat intolerance.   Genitourinary:  Negative for difficulty urinating and hematuria.   Musculoskeletal:  Negative for arthralgias and myalgias.   Skin:  Negative for rash and wound.   Allergic/Immunologic: Negative for environmental allergies and food allergies.   Neurological:  Negative for weakness and headaches.   Hematological:  Negative for adenopathy. Does not bruise/bleed easily.   Psychiatric/Behavioral:  Negative for sleep disturbance. The patient is not nervous/anxious.      Objective:   /60   Pulse (!) 125   Ht 5' 8" (1.727 m)   Wt 60.1 kg (132 lb 9.6 oz)   SpO2 98%   BMI 20.16 kg/m²   Physical Exam  Vitals and nursing note reviewed.   Constitutional:       General: He is not in acute distress.     Appearance: He is well-developed. He is not diaphoretic.   HENT:      Head: Normocephalic and atraumatic.      Right Ear: External ear normal.      Left Ear: External ear normal.      Nose: Nose normal. No rhinorrhea.   Eyes:      Extraocular Movements: Extraocular movements intact.      Pupils: Pupils are equal, round, and reactive to light.   Cardiovascular:      Rate and Rhythm: Regular rhythm. Tachycardia present.      Pulses: Normal pulses.   Pulmonary:      Effort: Pulmonary effort is normal. No respiratory distress.      Breath sounds: Normal breath sounds.   Abdominal:      General: Bowel sounds are normal.      Palpations: Abdomen is soft.   Musculoskeletal:         General: Normal range of motion.      Cervical back: Normal range of motion and neck supple.   Skin:     General: Skin is warm and dry.      Capillary Refill: Capillary refill takes less than 2 seconds.      Findings: No rash.   Neurological:      General: No focal deficit present.      Mental Status: He is " alert and oriented to person, place, and time. Mental status is at baseline.      Cranial Nerves: No cranial nerve deficit.      Motor: No weakness.      Gait: Gait normal.   Psychiatric:         Attention and Perception: He is attentive.         Mood and Affect: Mood normal. Mood is not anxious or depressed. Affect is not labile, blunt, angry or inappropriate.         Speech: He is communicative. Speech is not rapid and pressured, delayed, slurred or tangential.         Behavior: Behavior normal. Behavior is not agitated, slowed, aggressive, withdrawn, hyperactive or combative.         Thought Content: Thought content normal. Thought content is not paranoid or delusional. Thought content does not include homicidal or suicidal ideation. Thought content does not include homicidal or suicidal plan.         Cognition and Memory: Memory is not impaired.         Judgment: Judgment normal. Judgment is not impulsive or inappropriate.         Assessment:     1. Hospital discharge follow-up    2. Type 1 diabetes mellitus with hyperglycemia    3. Encounter for long-term (current) use of medications    4. Encounter for lipid screening for cardiovascular disease    5. Tachycardia    6. Insulin pump status    7. Hypertension associated with diabetes      MDM:   High medical complexity.  Moderate to high risk.  Total time: 40 minutes.  This includes total time spent on the encounter, which includes face to face time and non-face to face time preparing to see the patient (eg, review of previous medical records, tests), Obtaining and/or reviewing separately obtained history, documenting clinical information in the electronic or other health record, independently interpreting results (not separately reported)/communicating results to the patient/family/caregiver, and/or care coordination (not separately reported).    I have Reviewed and summarized old records.  I have performed thorough medication reconciliation today and discussed  risk and benefits of medications.  I have reviewed EKG, labs and discussed with patient.  All questions were answered.  I am requesting old records and will review them once they are available. Endocrine    I have signed for the following orders AND/OR meds.  Orders Placed This Encounter   Procedures    CBC Without Differential     Standing Status:   Future     Standing Expiration Date:   11/19/2024    Comprehensive Metabolic Panel     Standing Status:   Future     Standing Expiration Date:   11/19/2024    TSH     Standing Status:   Future     Standing Expiration Date:   11/19/2024    Hemoglobin A1C     Standing Status:   Future     Standing Expiration Date:   11/19/2024    Lipid Panel     Standing Status:   Future     Standing Expiration Date:   11/19/2024    Ambulatory referral/consult to Cardiology     Standing Status:   Future     Standing Expiration Date:   10/21/2024     Referral Priority:   Routine     Referral Type:   Consultation     Referral Reason:   Specialty Services Required     Requested Specialty:   Cardiology     Number of Visits Requested:   1    IN OFFICE EKG 12-LEAD (to Muse)     Medications Ordered This Encounter   Medications    losartan (COZAAR) 50 MG tablet     Sig: Take 1 tablet (50 mg total) by mouth once daily.     Dispense:  90 tablet     Refill:  4     .        Follow up if symptoms worsen or fail to improve.  Future Appointments       Date Provider Specialty Appt Notes    9/22/2023  Lab     11/7/2023 Scott Orozco MD Cardiology .    11/16/2023 Cristina Mccormick APRN,ANP-C Endocrinology 3 mth f/u [facility]          If no improvement in symptoms or symptoms worsen, advised to call/follow-up at clinic or go to ER. Patient voiced understanding and all questions/concerns were addressed.   DISCLAIMER: This note was compiled by using a speech recognition dictation system and therefore please be aware that typographical / speech recognition errors can and do occur.  Please contact me if you  see any errors specifically.    Nico Leonard M.D.       Office: [phone] [address] [place] [address]  FAX: [phone]

## 2023-09-21 NOTE — ASSESSMENT & PLAN NOTE
Transitional Care Note    Family and/or Caretaker present at visit?  No.  Diagnostic tests reviewed/disposition: I have reviewed all completed as well as pending diagnostic tests at the time of discharge.  Disease/illness education: DKGAMA  Home health/community services discussion/referrals: Patient does not have home health established from hospital visit.  They do not need home health.  If needed, we will set up home health for the patient.   Establishment or re-establishment of referral orders for community resources: No other necessary community resources.   Discussion with other health care providers: No discussion with other health care providers necessary.

## 2023-09-22 ENCOUNTER — LAB VISIT (OUTPATIENT)
Dept: LAB | Facility: HOSPITAL | Age: 23
End: 2023-09-22
Attending: FAMILY MEDICINE
Payer: MEDICAID

## 2023-09-22 DIAGNOSIS — E10.42 TYPE 1 DIABETES MELLITUS WITH DIABETIC POLYNEUROPATHY: ICD-10-CM

## 2023-09-22 DIAGNOSIS — Z79.899 ENCOUNTER FOR LONG-TERM (CURRENT) USE OF MEDICATIONS: ICD-10-CM

## 2023-09-22 DIAGNOSIS — E10.65 TYPE 1 DIABETES MELLITUS WITH HYPERGLYCEMIA: ICD-10-CM

## 2023-09-22 DIAGNOSIS — Z09 HOSPITAL DISCHARGE FOLLOW-UP: ICD-10-CM

## 2023-09-22 PROBLEM — I15.2 HYPERTENSION ASSOCIATED WITH DIABETES: Chronic | Status: ACTIVE | Noted: 2023-09-22

## 2023-09-22 PROBLEM — E11.59 HYPERTENSION ASSOCIATED WITH DIABETES: Status: ACTIVE | Noted: 2023-09-22

## 2023-09-22 PROBLEM — E11.59 HYPERTENSION ASSOCIATED WITH DIABETES: Chronic | Status: ACTIVE | Noted: 2023-09-22

## 2023-09-22 PROBLEM — R00.0 TACHYCARDIA: Chronic | Status: ACTIVE | Noted: 2023-09-21

## 2023-09-22 PROBLEM — I15.2 HYPERTENSION ASSOCIATED WITH DIABETES: Status: ACTIVE | Noted: 2023-09-22

## 2023-09-22 PROBLEM — Z96.41 INSULIN PUMP STATUS: Chronic | Status: ACTIVE | Noted: 2023-09-21

## 2023-09-22 LAB
ALBUMIN SERPL BCP-MCNC: 3.5 G/DL (ref 3.5–5.2)
ALBUMIN/CREAT UR: 4.8 UG/MG (ref 0–30)
ALP SERPL-CCNC: 73 U/L (ref 55–135)
ALT SERPL W/O P-5'-P-CCNC: 11 U/L (ref 10–44)
ANION GAP SERPL CALC-SCNC: 8 MMOL/L (ref 8–16)
AST SERPL-CCNC: 14 U/L (ref 10–40)
BILIRUB SERPL-MCNC: 0.7 MG/DL (ref 0.1–1)
BUN SERPL-MCNC: 11 MG/DL (ref 6–20)
CALCIUM SERPL-MCNC: 9.6 MG/DL (ref 8.7–10.5)
CHLORIDE SERPL-SCNC: 103 MMOL/L (ref 95–110)
CHOLEST SERPL-MCNC: 151 MG/DL (ref 120–199)
CHOLEST/HDLC SERPL: 4.4 {RATIO} (ref 2–5)
CO2 SERPL-SCNC: 26 MMOL/L (ref 23–29)
CREAT SERPL-MCNC: 0.9 MG/DL (ref 0.5–1.4)
CREAT UR-MCNC: 332 MG/DL (ref 23–375)
ERYTHROCYTE [DISTWIDTH] IN BLOOD BY AUTOMATED COUNT: 13.2 % (ref 11.5–14.5)
EST. GFR  (NO RACE VARIABLE): >60 ML/MIN/1.73 M^2
ESTIMATED AVG GLUCOSE: 197 MG/DL (ref 68–131)
GLUCOSE SERPL-MCNC: 311 MG/DL (ref 70–110)
HBA1C MFR BLD: 8.5 % (ref 4–5.6)
HCT VFR BLD AUTO: 37.4 % (ref 40–54)
HDLC SERPL-MCNC: 34 MG/DL (ref 40–75)
HDLC SERPL: 22.5 % (ref 20–50)
HGB BLD-MCNC: 12.6 G/DL (ref 14–18)
LDLC SERPL CALC-MCNC: 96.8 MG/DL (ref 63–159)
MCH RBC QN AUTO: 29.2 PG (ref 27–31)
MCHC RBC AUTO-ENTMCNC: 33.7 G/DL (ref 32–36)
MCV RBC AUTO: 87 FL (ref 82–98)
MICROALBUMIN UR DL<=1MG/L-MCNC: 16 UG/ML
NONHDLC SERPL-MCNC: 117 MG/DL
PLATELET # BLD AUTO: 494 K/UL (ref 150–450)
PMV BLD AUTO: 9.5 FL (ref 9.2–12.9)
POTASSIUM SERPL-SCNC: 4.2 MMOL/L (ref 3.5–5.1)
PROT SERPL-MCNC: 6.6 G/DL (ref 6–8.4)
RBC # BLD AUTO: 4.32 M/UL (ref 4.6–6.2)
SODIUM SERPL-SCNC: 137 MMOL/L (ref 136–145)
T4 FREE SERPL-MCNC: 1.23 NG/DL (ref 0.71–1.51)
TRIGL SERPL-MCNC: 101 MG/DL (ref 30–150)
TSH SERPL DL<=0.005 MIU/L-ACNC: <0.01 UIU/ML (ref 0.4–4)
WBC # BLD AUTO: 5.38 K/UL (ref 3.9–12.7)

## 2023-09-22 PROCEDURE — 83036 HEMOGLOBIN GLYCOSYLATED A1C: CPT | Performed by: NURSE PRACTITIONER

## 2023-09-22 PROCEDURE — 36415 COLL VENOUS BLD VENIPUNCTURE: CPT | Mod: PO | Performed by: NURSE PRACTITIONER

## 2023-09-22 PROCEDURE — 80053 COMPREHEN METABOLIC PANEL: CPT | Performed by: NURSE PRACTITIONER

## 2023-09-22 PROCEDURE — 84443 ASSAY THYROID STIM HORMONE: CPT | Performed by: NURSE PRACTITIONER

## 2023-09-22 PROCEDURE — 84439 ASSAY OF FREE THYROXINE: CPT | Performed by: NURSE PRACTITIONER

## 2023-09-22 PROCEDURE — 85027 COMPLETE CBC AUTOMATED: CPT | Performed by: FAMILY MEDICINE

## 2023-09-22 PROCEDURE — 82570 ASSAY OF URINE CREATININE: CPT | Performed by: NURSE PRACTITIONER

## 2023-09-22 PROCEDURE — 80061 LIPID PANEL: CPT | Performed by: NURSE PRACTITIONER

## 2023-09-22 RX ORDER — LEVOTHYROXINE SODIUM 175 UG/1
TABLET ORAL
Qty: 30 TABLET | Refills: 6 | Status: SHIPPED | OUTPATIENT
Start: 2023-09-22 | End: 2023-09-28 | Stop reason: SDUPTHER

## 2023-09-22 NOTE — ASSESSMENT & PLAN NOTE
Follow-up closely with diabetes management. We will plan to monitor hemoglobin A1c at designated intervals 3 to 6 months.  I recommend ongoing Education for diabetic diet and exercise protocol.  We will continue to monitor for side effects.    Please be advised of symptoms to monitor for and to notify me immediately if persistent or worsening.  Follow up with Ophthalmology/Optometry and Podiatry at least annually.

## 2023-09-22 NOTE — ASSESSMENT & PLAN NOTE
Continue losartan.  Counseled on importance of hypertension disease course, I recommend ongoing Education for DASH-diet and exercise.  Counseled on medication regimen importance of treating high blood pressure.  Please be advised of risk of untreated blood pressure as discussed.  Please advised of ER precautions were given for symptoms of hypertensive urgency and emergency.

## 2023-09-26 ENCOUNTER — PATIENT MESSAGE (OUTPATIENT)
Dept: FAMILY MEDICINE | Facility: CLINIC | Age: 23
End: 2023-09-26
Payer: MEDICAID

## 2023-09-26 DIAGNOSIS — R79.89 ABNORMAL CBC: Primary | ICD-10-CM

## 2023-09-26 NOTE — PROGRESS NOTES
Make follow-up lab appointment per recommendation below.  Check to see if patient has seen the results through my chart.  If not then,  #CALL THE PATIENT# to discuss results/see if they have questions and document verification of contact. Make F/U appt if needed. [phone]    #My interpretation that was sent to them through [employer]:  Zoran, I have reviewed your recent blood work.     Your complete blood count is abnormal showing mild to moderate anemia.  This is a change from your previous baseline.  I recommend further evaluation with iron, folate, B12 and ferritin levels follow-up after for results..    Your cholesterol is improved.      =========================  Also please address any outstanding health maintenance that may be due: Pneumococcal Vaccines (Age 0-64)(2 - PPSV23 or PCV20) due on 12/07/2010  Eye Exam due on 12/05/2017  COVID-19 Vaccine(2 - Pfizer series) due on 06/13/2022  Foot Exam due on 04/11/2023  Influenza Vaccine(1) due on 09/01/2023

## 2023-09-27 ENCOUNTER — PATIENT MESSAGE (OUTPATIENT)
Dept: FAMILY MEDICINE | Facility: CLINIC | Age: 23
End: 2023-09-27
Payer: MEDICAID

## 2023-09-27 ENCOUNTER — PATIENT MESSAGE (OUTPATIENT)
Dept: GASTROENTEROLOGY | Facility: CLINIC | Age: 23
End: 2023-09-27
Payer: MEDICAID

## 2023-09-27 RX ORDER — PROCHLORPERAZINE 25 MG
25 SUPPOSITORY, RECTAL RECTAL EVERY 12 HOURS PRN
Qty: 12 SUPPOSITORY | Refills: 0 | Status: SHIPPED | OUTPATIENT
Start: 2023-09-27

## 2023-09-27 NOTE — TELEPHONE ENCOUNTER
I received your message which was reviewed along with the the medication list and allergies that we have below.  Please review it for accuracy to make sure that we have the most recent records on your history.     Based on this, the following orders were placed AND/OR medicines were sent in.     No orders of the defined types were placed in this encounter.      Medications written and sent at this time include:  Medications Ordered This Encounter   Medications    prochlorperazine (COMPAZINE) 25 MG suppository     Sig: Place 1 suppository (25 mg total) rectally every 12 (twelve) hours as needed for Nausea.     Dispense:  12 suppository     Refill:  0       Your pharmacy(ies) of choice at this time on record include the list below and any medications would have been sent to the one at the top.    [facility] [place], [place] - [address] [address]  [address] [address]  [facility] [address]  Phone: [phone] Fax: [phone]    [id number] - [address] [address]  Phone: [phone] Fax: [phone]      Thank you for choosing us as your healthcare provider!  Dr. Nico Leonard    ALLERGY LIST  Review of patient's allergies indicates:  No Known Allergies    MEDICATION LIST  Current Outpatient Medications on File Prior to Visit   Medication Sig Dispense Refill    busPIRone (BUSPAR) 15 MG tablet Take 7.5 mg by mouth 4 (four) times daily as needed.      famotidine (PEPCID) 40 MG tablet Take 1 tablet (40 mg total) by mouth every evening. , about an hour before bedtime. 90 tablet 3    GI cocktail antac/dicyc/lidoc Take 10 mLs by mouth every 4 to 6 hours as needed (nausea, chest discomfort). (Patient not taking: Reported on 9/21/2023) 400 mL 3    insulin aspart U-100 (NOVOLOG) 100 unit/mL injection Pt uses 100 units a day via insulin pump 30 mL 12    levothyroxine (SYNTHROID, LEVOTHROID) 175 MCG tablet Take 1 tablet by  mouth 6 days per week and 1.5 tablets on day 7. 30 tablet 6    losartan (COZAAR) 50 MG tablet Take 1 tablet (50 mg total) by mouth once daily. 90 tablet 4    miSOPROStoL (CYTOTEC) 100 MCG Tab Take 1 tablet (100 mcg total) by mouth 4 (four) times daily before meals and nightly. (Patient not taking: Reported on 9/21/2023) 120 tablet 11    ondansetron (ZOFRAN-ODT) 8 MG TbDL Take 1 tablet by mouth every 8 (eight) hours as needed.      promethazine (PHENERGAN) 12.5 MG Tab Take 1 tablet (12.5 mg total) by mouth every 6 (six) hours as needed (nausea and vomiting). 30 tablet 1    sucralfate (CARAFATE) 1 gram tablet Take 1 tablet (1 g total) by mouth 4 (four) times daily before meals and nightly. 120 tablet 11    [DISCONTINUED] prochlorperazine (COMPAZINE) 25 MG suppository Place 1 suppository (25 mg total) rectally every 12 (twelve) hours as needed for Nausea. 12 suppository 0     No current facility-administered medications on file prior to visit.       HEALTH MAINTENANCE THAT IS OVERDUE OR NEEDS TO BE UPDATED ON OUR CHART IS LISTED BELOW.  IF YOU HAVE HAD IT DONE ELSEWHERE, PLEASE SEND US DATES AND RECORDS IF YOU HAVE THEM TO MAKE YOUR CHART ACCURATE.  IF YOU HAVE NOT HAD THESE DONE AND ARE READY FOR US TO SCHEDULE THEM, PLEASE SEND US A MESSAGE.  Health Maintenance Due   Topic Date Due    Pneumococcal Vaccines (Age 0-64) (2 - PPSV23 or PCV20) 12/07/2010    Eye Exam  12/05/2017    COVID-19 Vaccine (2 - Pfizer series) 06/13/2022    Foot Exam  04/11/2023    Influenza Vaccine (1) 09/01/2023       DISCLAIMER: This note was compiled by using a speech recognition dictation system and therefore please be aware that typographical / speech recognition errors can and do occur.  Please contact me if you see any errors specifically.    Nico Leonard MD  We Offer Telehealth & Same Day Appointments!   Book your Telehealth appointment with me through my nurse or   Clinic appointments on [facility]!  [phone]     Check out my  Facebook Page and Follow Me at: CLICK HERE    Check out my website at [employer] by clicking on: CLICK HERE    To Schedule appointments online, go to [employer]: CLICK HERE     Location: [email or web address]    [address] [facility] [place] [address]    FAX: [phone]

## 2023-09-28 DIAGNOSIS — E03.9 HYPOTHYROIDISM, UNSPECIFIED TYPE: ICD-10-CM

## 2023-09-28 RX ORDER — LEVOTHYROXINE SODIUM 175 UG/1
TABLET ORAL
Qty: 30 TABLET | Refills: 6 | Status: SHIPPED | OUTPATIENT
Start: 2023-09-28 | End: 2023-10-10 | Stop reason: SDUPTHER

## 2023-10-05 ENCOUNTER — PATIENT MESSAGE (OUTPATIENT)
Dept: ENDOCRINOLOGY | Facility: CLINIC | Age: 23
End: 2023-10-05
Payer: MEDICAID

## 2023-10-05 ENCOUNTER — PATIENT MESSAGE (OUTPATIENT)
Dept: ADMINISTRATIVE | Facility: OTHER | Age: 23
End: 2023-10-05
Payer: MEDICAID

## 2023-10-10 DIAGNOSIS — E03.9 HYPOTHYROIDISM, UNSPECIFIED TYPE: ICD-10-CM

## 2023-10-12 RX ORDER — LEVOTHYROXINE SODIUM 175 UG/1
TABLET ORAL
Qty: 96 TABLET | Refills: 3 | Status: SHIPPED | OUTPATIENT
Start: 2023-10-12 | End: 2023-11-10 | Stop reason: DRUGHIGH

## 2023-10-15 NOTE — NURSING TRANSFER
Fine, set up paperwork for application for assistance from [employer]   Nursing Transfer Note    Sending Transfer Note      9/9/2018 5:00 PM  Transfer via bed  From PICU 12 to Peds 447   Transfered with chart, medications, home insulin pump, personal belongings  Transported by: Chata Castillo RN; AZAM Martin RN; AMY Byrd RN  Report given as documented in PER Handoff on Doc Flowsheet  VS's per Doc Flowsheet  Medicines sent: Yes  Chart sent with patient: Yes  What caregiver / guardian was Notified of transfer: Mother and Patient  Chata Castillo RN  9/9/2018 5:00 PM

## 2023-11-07 ENCOUNTER — PATIENT MESSAGE (OUTPATIENT)
Dept: ENDOCRINOLOGY | Facility: CLINIC | Age: 23
End: 2023-11-07
Payer: MEDICAID

## 2023-11-08 ENCOUNTER — PATIENT MESSAGE (OUTPATIENT)
Dept: ENDOCRINOLOGY | Facility: CLINIC | Age: 23
End: 2023-11-08

## 2023-11-08 ENCOUNTER — PATIENT MESSAGE (OUTPATIENT)
Dept: ENDOCRINOLOGY | Facility: CLINIC | Age: 23
End: 2023-11-08
Payer: MEDICAID

## 2023-11-08 ENCOUNTER — OFFICE VISIT (OUTPATIENT)
Dept: ENDOCRINOLOGY | Facility: CLINIC | Age: 23
End: 2023-11-08
Payer: MEDICAID

## 2023-11-08 DIAGNOSIS — E10.65 TYPE 1 DIABETES MELLITUS WITH HYPERGLYCEMIA: Primary | ICD-10-CM

## 2023-11-08 DIAGNOSIS — E03.9 HYPOTHYROIDISM, UNSPECIFIED TYPE: ICD-10-CM

## 2023-11-08 DIAGNOSIS — Z96.41 INSULIN PUMP STATUS: Chronic | ICD-10-CM

## 2023-11-08 PROCEDURE — 3052F HG A1C>EQUAL 8.0%<EQUAL 9.0%: CPT | Mod: CPTII,95,, | Performed by: NURSE PRACTITIONER

## 2023-11-08 PROCEDURE — 3066F NEPHROPATHY DOC TX: CPT | Mod: CPTII,95,, | Performed by: NURSE PRACTITIONER

## 2023-11-08 PROCEDURE — 1159F MED LIST DOCD IN RCRD: CPT | Mod: CPTII,95,, | Performed by: NURSE PRACTITIONER

## 2023-11-08 PROCEDURE — 3066F PR DOCUMENTATION OF TREATMENT FOR NEPHROPATHY: ICD-10-PCS | Mod: CPTII,95,, | Performed by: NURSE PRACTITIONER

## 2023-11-08 PROCEDURE — 4010F PR ACE/ARB THEARPY RXD/TAKEN: ICD-10-PCS | Mod: CPTII,95,, | Performed by: NURSE PRACTITIONER

## 2023-11-08 PROCEDURE — 3061F PR NEG MICROALBUMINURIA RESULT DOCUMENTED/REVIEW: ICD-10-PCS | Mod: CPTII,95,, | Performed by: NURSE PRACTITIONER

## 2023-11-08 PROCEDURE — 4010F ACE/ARB THERAPY RXD/TAKEN: CPT | Mod: CPTII,95,, | Performed by: NURSE PRACTITIONER

## 2023-11-08 PROCEDURE — 99214 PR OFFICE/OUTPT VISIT, EST, LEVL IV, 30-39 MIN: ICD-10-PCS | Mod: 95,,, | Performed by: NURSE PRACTITIONER

## 2023-11-08 PROCEDURE — 3052F PR MOST RECENT HEMOGLOBIN A1C LEVEL 8.0 - < 9.0%: ICD-10-PCS | Mod: CPTII,95,, | Performed by: NURSE PRACTITIONER

## 2023-11-08 PROCEDURE — 1160F RVW MEDS BY RX/DR IN RCRD: CPT | Mod: CPTII,95,, | Performed by: NURSE PRACTITIONER

## 2023-11-08 PROCEDURE — 1160F PR REVIEW ALL MEDS BY PRESCRIBER/CLIN PHARMACIST DOCUMENTED: ICD-10-PCS | Mod: CPTII,95,, | Performed by: NURSE PRACTITIONER

## 2023-11-08 PROCEDURE — 1159F PR MEDICATION LIST DOCUMENTED IN MEDICAL RECORD: ICD-10-PCS | Mod: CPTII,95,, | Performed by: NURSE PRACTITIONER

## 2023-11-08 PROCEDURE — 99214 OFFICE O/P EST MOD 30 MIN: CPT | Mod: 95,,, | Performed by: NURSE PRACTITIONER

## 2023-11-08 PROCEDURE — 3061F NEG MICROALBUMINURIA REV: CPT | Mod: CPTII,95,, | Performed by: NURSE PRACTITIONER

## 2023-11-08 NOTE — PROGRESS NOTES
Subjective:       Patient ID: Zoran Corado is a 23 y.o. male.    Chief Complaint: Diabetes     The patient location is: home   The chief complaint leading to consultation is: diabetes     Visit type: audiovisual    Face to Face time with patient: 15 min   25  minutes of total time spent on the encounter, which includes face to face time and non-face to face time preparing to see the patient (eg, review of tests), Obtaining and/or reviewing separately obtained history, Documenting clinical information in the electronic or other health record, Independently interpreting results (not separately reported) and communicating results to the patient/family/caregiver, or Care coordination (not separately reported).     Each patient to whom he or she provides medical services by telemedicine is:  (1) informed of the relationship between the physician and patient and the respective role of any other health care provider with respect to management of the patient; and (2) notified that he or she may decline to receive medical services by telemedicine and may withdraw from such care at any time.    Notes:   covid.     HPI Pt is a 23 y.o. wm  with a diagnosis of Type 1 diabetes mellitus  (2007 age 7 years). iagnosed approximately hypothyroidism, as well as chronic conditions pending review including cystic fibrosis carrier , gastroparesis.   Pt initially dx with Type 1 DM at age 7 years, and CF  (2015). later on. Has used insulin pump but interfered with job.  He has frequent and recurrent hospitalizations for DKA and  Other pertinent medical and social information noted includes, but not limited to: Works in [employer].     Interim Events:Nov 8, 2023:  ER at [facility] about 3 weeks ago--states he was sick--feeling horrible--would walk across room and feel so weak he had to lay down.  [facility] stated DKA but he states he didn't feel like it. He messed around and got side tracked and let his pump run out of insulin.  I note TSH  severely depressed but no mention in Care every where.  Currently not using dexcom r/t supply issues.       Aug 10, 2023: No acute events. States a1c done per Dr. Leonard and was 10.  Pump and sensor downloaded.  Overall readings look much better than an a1c that high.  Has been having some freq mild reccurrent hypoglycemia, but seems isolated to days working outside in heat.  Also states he is running out of insulin--2 vials not enough, He is using closer to 80-90 units a day. Gastroparesis has been better without any severe flares. Currently working at [employer].  And he did not get labs done before this visit.       Current DM meds:   Tandem pump  ~ 100   units a day. (With exacerbations)        Failed DM meds:  none        Back up plan for insulin pump hiatus: 28 units of basal,  5 units with meals plus ss 1:150   Statin: na        Not tolerated statin : none   ACE/ARB:na       Not tolerated ACE/ARB: na   Known Diabetic complications: gastroparesis       march 31, 2023:  Pt returns for routine f/u. Gastroparesis exacerbations have been relatively brief.  Looks much better.  Pump and sensor downloaded.  A couple of days he was having n/v and primarily slept--so those glucoses were somewhat elevated--and he had an infusion site issue and that markedly elevated rreadigns.        Dec 2, 2022:  Pt new to me--needed Friday appt, other provider no longer available.  Pleasant, good historian.  He changed jobs and restarted pump.  Date is off.  No focal complaints with exception of bloating r/t gastroparesis.   He is/was being worked up for gastric pacemaker at [facility].  He did have a spell in last couple of weeks sensor delayed in shipment so no data available. He has had frequent DKA--partially r/t gastroparesis, appears a lot was related to cannabinoid hyperemesis syndrome, and a couple of times missed insulin when not on pump.  He is has since stopped marijuana with job, and that has been beneficial.  He is also  missing levo, and when takes, takes with other meds.       Review of Systems   Constitutional:  Negative for appetite change and unexpected weight change.   HENT:  Negative for hearing loss and trouble swallowing.    Eyes:  Negative for photophobia and visual disturbance.   Respiratory:  Negative for cough and shortness of breath.    Cardiovascular:  Negative for chest pain and leg swelling.   Gastrointestinal:  Negative for abdominal distention, abdominal pain, constipation and diarrhea.   Genitourinary:  Negative for difficulty urinating and urgency.   Musculoskeletal:  Negative for arthralgias and myalgias.   Neurological:  Negative for weakness and numbness.   Psychiatric/Behavioral:  Negative for sleep disturbance. The patient is not nervous/anxious.          Objective:      Physical Exam  Constitutional:       Appearance: Normal appearance.   HENT:      Head: Normocephalic and atraumatic.      Nose: Nose normal.   Neurological:      General: No focal deficit present.      Mental Status: He is alert and oriented to person, place, and time.   Psychiatric:         Mood and Affect: Mood normal.         Behavior: Behavior normal.         Thought Content: Thought content normal.         Judgment: Judgment normal.             There were no vitals taken for this visit.    Hemoglobin A1C   Date Value Ref Range Status   09/22/2023 8.5 (H) 4.0 - 5.6 % Final     Comment:     ADA Screening Guidelines:  5.7-6.4%  Consistent with prediabetes  >or=6.5%  Consistent with diabetes    High levels of fetal hemoglobin interfere with the HbA1C  assay. Heterozygous hemoglobin variants (HbS, HgC, etc)do  not significantly interfere with this assay.   However, presence of multiple variants may affect accuracy.     09/05/2023 9.0 (H) 4.6 - 6.2 % Final   07/08/2022 9.1 (H) 0.0 - 5.6 % Final     Comment:     Reference Interval:  5.0 - 5.6 Normal   5.7 - 6.4 High Risk   > 6.5 Diabetic      Hgb A1c results are standardized based on the  "(NGSP) National   Glycohemoglobin Standardization Program.      Hemoglobin A1C levels are related to mean serum/plasma glucose   during the preceding 2-3 months.        04/11/2022 7.1 (H) 4.0 - 5.6 % Final     Comment:     ADA Screening Guidelines:  5.7-6.4%  Consistent with prediabetes  >or=6.5%  Consistent with diabetes    High levels of fetal hemoglobin interfere with the HbA1C  assay. Heterozygous hemoglobin variants (HbS, HgC, etc)do  not significantly interfere with this assay.   However, presence of multiple variants may affect accuracy.         Chemistry        Component Value Date/Time     (L) 10/23/2023 0420     09/22/2023 1003    K 3.5 (L) 10/23/2023 0420    K 4.2 09/22/2023 1003     09/22/2023 1003    CO2 31 10/23/2023 0420    CO2 26 09/22/2023 1003    BUN 24 (H) 10/23/2023 0420    BUN 11 09/22/2023 1003    CREATININE 1.31 (H) 10/23/2023 0420    CREATININE 0.9 09/22/2023 1003     (H) 09/22/2023 1003    GLU >500 09/09/2018 0433        Component Value Date/Time    CALCIUM 9.0 10/23/2023 0420    CALCIUM 9.6 09/22/2023 1003    ALKPHOS 81 10/23/2023 0420    ALKPHOS 73 09/22/2023 1003    AST 12 10/23/2023 0420    AST 14 09/22/2023 1003    AST 30 04/12/2016 1113    ALT 8 10/23/2023 0420    ALT 11 09/22/2023 1003    BILITOT 1.5 10/23/2023 0420    BILITOT 0.7 09/22/2023 1003    ESTGFRAFRICA >60 07/08/2022 1342    EGFRNONAA >60 07/08/2022 1342          Lab Results   Component Value Date    CHOL 151 09/22/2023     Lab Results   Component Value Date    HDL 34 (L) 09/22/2023     Lab Results   Component Value Date    LDLCALC 96.8 09/22/2023     Lab Results   Component Value Date    TRIG 101 09/22/2023     Lab Results   Component Value Date    CHOLHDL 22.5 09/22/2023     Lab Results   Component Value Date    MICALBCREAT 4.8 09/22/2023     Lab Results   Component Value Date    TSH <0.010 (L) 09/22/2023     No results found for: "[id number]"    Assessment:     1. Type 1 diabetes mellitus with " hyperglycemia  blood-glucose sensor (DEXCOM G6 SENSOR) Aylin    blood-glucose transmitter (DEXCOM G6 TRANSMITTER) Aylin      2. Insulin pump status        3. Hypothyroidism, unspecified type                Plan:     Reinforced levo--qam--empty stomach, water only ideally an hour before eating. On levo 1.75      Decrease Levo to .150 mcg     ORDERS 11/08/2023     TSH, in 6 weeks.     F/u 4 mo with a1c. Tsh prior

## 2023-11-09 ENCOUNTER — PATIENT MESSAGE (OUTPATIENT)
Dept: ENDOCRINOLOGY | Facility: CLINIC | Age: 23
End: 2023-11-09
Payer: MEDICAID

## 2023-11-10 DIAGNOSIS — E03.9 HYPOTHYROIDISM, UNSPECIFIED TYPE: Primary | ICD-10-CM

## 2023-11-10 RX ORDER — BLOOD-GLUCOSE SENSOR
EACH MISCELLANEOUS
Qty: 9 EACH | Refills: 3 | Status: SHIPPED | OUTPATIENT
Start: 2023-11-10

## 2023-11-10 RX ORDER — LEVOTHYROXINE SODIUM 150 UG/1
150 TABLET ORAL
Qty: 30 TABLET | Refills: 1 | Status: SHIPPED | OUTPATIENT
Start: 2023-11-10 | End: 2024-03-01

## 2023-11-10 RX ORDER — BLOOD-GLUCOSE TRANSMITTER
EACH MISCELLANEOUS
Qty: 1 EACH | Refills: 3 | Status: SHIPPED | OUTPATIENT
Start: 2023-11-10

## 2023-11-10 RX ORDER — BLOOD-GLUCOSE TRANSMITTER
EACH MISCELLANEOUS
Qty: 1 EACH | Refills: 3 | Status: SHIPPED | OUTPATIENT
Start: 2023-11-10 | End: 2023-11-10 | Stop reason: SDUPTHER

## 2023-11-10 RX ORDER — BLOOD-GLUCOSE SENSOR
EACH MISCELLANEOUS
Qty: 9 EACH | Refills: 3 | Status: SHIPPED | OUTPATIENT
Start: 2023-11-10 | End: 2023-11-10 | Stop reason: SDUPTHER

## 2023-11-10 RX ORDER — LEVOTHYROXINE SODIUM 175 UG/1
TABLET ORAL
Qty: 96 TABLET | Refills: 3 | OUTPATIENT
Start: 2023-11-10

## 2023-12-08 ENCOUNTER — PATIENT MESSAGE (OUTPATIENT)
Dept: ENDOCRINOLOGY | Facility: CLINIC | Age: 23
End: 2023-12-08
Payer: MEDICAID

## 2023-12-25 PROBLEM — Z09 HOSPITAL DISCHARGE FOLLOW-UP: Status: RESOLVED | Noted: 2023-09-21 | Resolved: 2023-12-25

## 2023-12-27 ENCOUNTER — PATIENT OUTREACH (OUTPATIENT)
Dept: ADMINISTRATIVE | Facility: HOSPITAL | Age: 23
End: 2023-12-27
Payer: MEDICAID

## 2024-01-18 ENCOUNTER — OFFICE VISIT (OUTPATIENT)
Dept: FAMILY MEDICINE | Facility: CLINIC | Age: 24
End: 2024-01-18
Payer: MEDICAID

## 2024-01-18 ENCOUNTER — LAB VISIT (OUTPATIENT)
Dept: LAB | Facility: HOSPITAL | Age: 24
End: 2024-01-18
Attending: FAMILY MEDICINE
Payer: MEDICAID

## 2024-01-18 VITALS
RESPIRATION RATE: 18 BRPM | HEIGHT: 68 IN | TEMPERATURE: 98 F | WEIGHT: 127.88 LBS | BODY MASS INDEX: 19.38 KG/M2 | DIASTOLIC BLOOD PRESSURE: 70 MMHG | SYSTOLIC BLOOD PRESSURE: 136 MMHG | HEART RATE: 101 BPM | OXYGEN SATURATION: 100 %

## 2024-01-18 DIAGNOSIS — Z79.899 ENCOUNTER FOR LONG-TERM (CURRENT) USE OF MEDICATIONS: ICD-10-CM

## 2024-01-18 DIAGNOSIS — E10.65 TYPE 1 DIABETES MELLITUS WITH HYPERGLYCEMIA: Chronic | ICD-10-CM

## 2024-01-18 DIAGNOSIS — Z96.41 INSULIN PUMP STATUS: Chronic | ICD-10-CM

## 2024-01-18 DIAGNOSIS — Z09 HOSPITAL DISCHARGE FOLLOW-UP: ICD-10-CM

## 2024-01-18 DIAGNOSIS — E23.2 DIABETES INSIPIDUS: ICD-10-CM

## 2024-01-18 DIAGNOSIS — Z09 HOSPITAL DISCHARGE FOLLOW-UP: Primary | ICD-10-CM

## 2024-01-18 PROCEDURE — 36415 COLL VENOUS BLD VENIPUNCTURE: CPT | Mod: PO | Performed by: FAMILY MEDICINE

## 2024-01-18 PROCEDURE — 3075F SYST BP GE 130 - 139MM HG: CPT | Mod: CPTII,,, | Performed by: FAMILY MEDICINE

## 2024-01-18 PROCEDURE — 3078F DIAST BP <80 MM HG: CPT | Mod: CPTII,,, | Performed by: FAMILY MEDICINE

## 2024-01-18 PROCEDURE — 99215 OFFICE O/P EST HI 40 MIN: CPT | Mod: PBBFAC,PO | Performed by: FAMILY MEDICINE

## 2024-01-18 PROCEDURE — 99999 PR PBB SHADOW E&M-EST. PATIENT-LVL V: CPT | Mod: PBBFAC,,, | Performed by: FAMILY MEDICINE

## 2024-01-18 PROCEDURE — 99215 OFFICE O/P EST HI 40 MIN: CPT | Mod: S$PBB,,, | Performed by: FAMILY MEDICINE

## 2024-01-18 PROCEDURE — 80053 COMPREHEN METABOLIC PANEL: CPT | Performed by: FAMILY MEDICINE

## 2024-01-18 PROCEDURE — 83036 HEMOGLOBIN GLYCOSYLATED A1C: CPT | Performed by: FAMILY MEDICINE

## 2024-01-18 PROCEDURE — 85027 COMPLETE CBC AUTOMATED: CPT | Performed by: FAMILY MEDICINE

## 2024-01-18 PROCEDURE — 1160F RVW MEDS BY RX/DR IN RCRD: CPT | Mod: CPTII,,, | Performed by: FAMILY MEDICINE

## 2024-01-18 PROCEDURE — 1159F MED LIST DOCD IN RCRD: CPT | Mod: CPTII,,, | Performed by: FAMILY MEDICINE

## 2024-01-18 PROCEDURE — 3052F HG A1C>EQUAL 8.0%<EQUAL 9.0%: CPT | Mod: CPTII,,, | Performed by: FAMILY MEDICINE

## 2024-01-18 PROCEDURE — 84443 ASSAY THYROID STIM HORMONE: CPT | Performed by: FAMILY MEDICINE

## 2024-01-18 PROCEDURE — 3008F BODY MASS INDEX DOCD: CPT | Mod: CPTII,,, | Performed by: FAMILY MEDICINE

## 2024-01-18 PROCEDURE — 84439 ASSAY OF FREE THYROXINE: CPT | Performed by: FAMILY MEDICINE

## 2024-01-18 PROCEDURE — 81001 URINALYSIS AUTO W/SCOPE: CPT | Performed by: FAMILY MEDICINE

## 2024-01-18 RX ORDER — DESMOPRESSIN ACETATE 0.1 MG/1
TABLET ORAL
Qty: 20 TABLET | Refills: 0 | Status: SHIPPED | OUTPATIENT
Start: 2024-01-18

## 2024-01-18 NOTE — PATIENT INSTRUCTIONS
Geovanny Meehan,     If you are due for any health screening(s) below please notify me so we can arrange them to be ordered and scheduled. Most healthy patients at your age complete them, but you are free to accept or refuse.     If you can't do it, I'll definitely understand. If you can, I'd certainly appreciate it!    Tests to Keep You Healthy    Eye Exam: DUE  Last Blood Pressure <= 139/89 (1/18/2024): Yes  Last HbA1c < 8 (09/22/2023): NO      Lets manage your A1c levels     Your last hemoglobin A1c was higher than the goal of less than 8. Hemoglobin A1c or HbA1c is a blood test that measures your average blood sugar levels over the past 3 months. It is the main test to help you and your health care team manage your diabetes.     Higher A1c levels are linked to diabetes complications, such as loss of vision, kidney disease, and nerve damage. Keeping your A1c at least less than 8 is important to stay healthy and we are here to help. Talk with your provider on how you can further improve your A1c.     We recommend that you set up blood work to get a repeat hemoglobin A1c in 3 months to monitor your diabetes. Let your health care team know if you have questions.    Your diabetic retinal eye exam is due     Diabetes is the #1 cause of blindness in the [place] - early detection before signs or symptoms develop can prevent debilitating blindness.     Our records indicate that you may be overdue for your annual diabetic eye exam. Eye screening can help identify patients at risk for developing vision loss which is common in diabetes. This simple screening is an important step to keeping you healthy and preventing complications from diabetes.     This recommended diabetic eye exam should take place once per year and can prevent and treat diabetes complications in the eye before developing symptoms. This can be done with a special camera is used to take photographs of the back of your eye without having to dilate them, or you can  see an eye doctor for a full dilated exam.     If you recently had your yearly diabetic eye exam performed outside of [facility], please let your Health care team know so that they can update your health record.        Were here to help you quit smoking     Our records indicated that you are still smoking. One of the best things you can do for your health is to stop smoking and we are here to help.     Talk with your provider about our Smoking Cessation Program and how we can support you on your journey.

## 2024-01-18 NOTE — PROGRESS NOTES
PLAN:      Problem List Items Addressed This Visit       Insulin pump status (Chronic)    Relevant Orders    CBC Without Differential    Comprehensive Metabolic Panel    TSH    Hemoglobin A1C    Urinalysis    Type 1 diabetes mellitus (Chronic)     Follow-up closely with Endocrinology. We will plan to monitor hemoglobin A1c at designated intervals 3 to 6 months.  I recommend ongoing Education for diabetic diet and exercise protocol.  We will continue to monitor for side effects.    Please be advised of symptoms to monitor for and to notify me immediately if persistent or worsening.  Follow up with Ophthalmology/Optometry and Podiatry at least annually.           Relevant Orders    CBC Without Differential    Comprehensive Metabolic Panel    TSH    Hemoglobin A1C    Urinalysis    Encounter for long-term (current) use of medications (Chronic)     Recheck labs.  Complete history and physical was completed today.  Complete and thorough medication reconciliation was performed.  Discussed risks and benefits of medications.  Advised patient on orders and health maintenance.  We discussed old records and old labs if available.  Will request any records not available through epic.  Continue current medications listed on your summary sheet.           Relevant Orders    CBC Without Differential    Comprehensive Metabolic Panel    TSH    Hemoglobin A1C    Urinalysis    Hospital discharge follow-up - Primary     Patient will have appointments with Endocrinology, Nephrology and GI.  Transitional Care Note    Family and/or Caretaker present at visit?  No.  Diagnostic tests reviewed/disposition: No diagnosic tests pending after this hospitalization.  Disease/illness education:  Altered mental status, hyponatremia  Home health/community services discussion/referrals: Patient does not have home health established from hospital visit.  They do not need home health.  If needed, we will set up home health for the patient.   Establishment  or re-establishment of referral orders for community resources: No other necessary community resources.   Discussion with other health care providers: No discussion with other health care providers necessary.                Relevant Orders    CBC Without Differential    Comprehensive Metabolic Panel    TSH    Hemoglobin A1C    Urinalysis    Diabetes insipidus     Start DDAVP per consult note recommendation.  Patient will follow-up with Nephrology soon.  ER precautions for severe symptoms.    Monitor renal function and sodium levels.         Relevant Medications    desmopressin (DDAVP) 0.1 MG Tab     Future Appointments       Date Provider Specialty Appt Notes    1/22/2024 José Miguel Russo Jr., MD Gastroenterology gastroparesis           Medication Management for assessment above:   Medication List with Changes/Refills   New Medications    DESMOPRESSIN (DDAVP) 0.1 MG TAB    Take 1/2 tab twice daily by mouth   Current Medications    BLOOD-GLUCOSE SENSOR (DEXCOM G6 SENSOR) WILFRID    Change every 10 days.    BLOOD-GLUCOSE TRANSMITTER (DEXCOM G6 TRANSMITTER) WILFRID    Change every 90 days.    BUSPIRONE (BUSPAR) 15 MG TABLET    Take 7.5 mg by mouth 4 (four) times daily as needed.    FAMOTIDINE (PEPCID) 40 MG TABLET    Take 1 tablet (40 mg total) by mouth every evening. , about an hour before bedtime.    GI COCKTAIL ANTAC/DICYC/LIDOC    Take 10 mLs by mouth every 4 to 6 hours as needed (nausea, chest discomfort).    INSULIN ASPART U-100 (NOVOLOG) 100 UNIT/ML INJECTION    Pt uses 100 units a day via insulin pump    LEVOTHYROXINE (SYNTHROID) 150 MCG TABLET    Take 1 tablet (150 mcg total) by mouth before breakfast.    LOSARTAN (COZAAR) 50 MG TABLET    Take 1 tablet (50 mg total) by mouth once daily.    MISOPROSTOL (CYTOTEC) 100 MCG TAB    Take 1 tablet (100 mcg total) by mouth 4 (four) times daily before meals and nightly.    ONDANSETRON (ZOFRAN-ODT) 8 MG TBDL    Take 1 tablet by mouth every 8 (eight) hours as needed.     PROCHLORPERAZINE (COMPAZINE) 25 MG SUPPOSITORY    Place 1 suppository (25 mg total) rectally every 12 (twelve) hours as needed for Nausea.    PROMETHAZINE (PHENERGAN) 12.5 MG TAB    Take 1 tablet (12.5 mg total) by mouth every 6 (six) hours as needed (nausea and vomiting).    SUCRALFATE (CARAFATE) 1 GRAM TABLET    Take 1 tablet (1 g total) by mouth 4 (four) times daily before meals and nightly.       Nico Leonard M.D.  ==========================================================================  Subjective:   Patient ID: Zoran Corado is a 23 y.o. male.  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   Chief Complaint: Hospital Follow Up (Altered Mental Status)      Problem List Items Addressed This Visit       Insulin pump status (Chronic)    Overview     Managed by MAJOR Olivares,ANP-[facility]           Type 1 diabetes mellitus (Chronic)    Overview     January 2024: Patient has an appointment upcoming with Endocrinology.  Recent hospitalization.  Patient left AMA.  Diabetes Medications               insulin aspart U-100 (NOVOLOG) 100 unit/mL injection Pt uses 100 units a day via insulin pump          Previous history:  Chronic.  Intermittent control.  Patient with recent hospitalization for DKA.  Patient was discharged after being stabilized with IV fluids adjusting insulin levels.  Patient follows withMAJOR Olivares,ANP-[facility]  He reports that his blood sugar has been stable since discharge.  Patient does have an insulin pump.Diabetes Management  "Status    Statin: Not taking  ACE/ARB: Taking    Screening or Prevention Patient's value Goal Complete/Controlled?   HgA1C Testing and Control   Lab Results   Component Value Date    HGBA1C 8.9 (H) 01/11/2024      Annually/Less than 8% No   Lipid profile : 09/22/2023 Annually Yes   LDL control Lab Results   Component Value Date    LDLCALC 96.8 09/22/2023    Annually/Less than 100 mg/dl  Yes   Nephropathy screening Lab Results   Component Value Date    LABMICR 16.0 09/22/2023     Lab Results   Component Value Date    PROTEINUA Trace (A) 08/14/2023     No results found for: "UTPCR"   Annually Yes   Blood pressure BP Readings from Last 1 Encounters:   01/18/24 136/70    Less than 140/90 Yes   Dilated retinal exam : 12/05/2016 Annually No   Foot exam   : 04/11/2022 Annually No            Current Assessment & Plan     Follow-up closely with Endocrinology. We will plan to monitor hemoglobin A1c at designated intervals 3 to 6 months.  I recommend ongoing Education for diabetic diet and exercise protocol.  We will continue to monitor for side effects.    Please be advised of symptoms to monitor for and to notify me immediately if persistent or worsening.  Follow up with Ophthalmology/Optometry and Podiatry at least annually.           Encounter for long-term (current) use of medications (Chronic)    Overview     January 2024: Reviewed labs.  CHRONIC. Stable. Compliant with medications for managed conditions. See medication list. No SE reported.   Routine lab analysis is being monitored. Refills were addressed.  Lab Results   Component Value Date    WBC 32.3 (H) 01/11/2024    HGB 15.1 01/11/2024    HCT 39.2 (L) 01/11/2024    MCV 74.8 (L) 01/11/2024     (H) 01/11/2024       Chemistry        Component Value Date/Time     (L) 01/15/2024 1726     09/22/2023 1003    K 4.3 01/15/2024 1726    K 4.2 09/22/2023 1003     09/22/2023 1003    CO2 23 01/15/2024 1726    CO2 26 09/22/2023 1003    BUN 15 01/15/2024 " 1726    BUN 11 09/22/2023 1003    CREATININE 0.84 (L) 01/15/2024 1726    CREATININE 0.9 09/22/2023 1003     (H) 09/22/2023 1003    GLU >500 09/09/2018 0433        Component Value Date/Time    CALCIUM 9.2 01/15/2024 1726    CALCIUM 9.6 09/22/2023 1003    ALKPHOS 115 01/11/2024 0630    ALKPHOS 73 09/22/2023 1003    AST 29 01/11/2024 0630    AST 14 09/22/2023 1003    AST 30 04/12/2016 1113    ALT 18 01/11/2024 0630    ALT 11 09/22/2023 1003    BILITOT 2.3 (H) 01/11/2024 0630    BILITOT 0.7 09/22/2023 1003    ESTGFRAFRICA >60 07/08/2022 1342    EGFRNONAA >60 07/08/2022 1342        Lab Results   Component Value Date    TSH <0.010 (L) 09/22/2023    FREET4 1.23 09/22/2023            Current Assessment & Plan     Recheck labs.  Complete history and physical was completed today.  Complete and thorough medication reconciliation was performed.  Discussed risks and benefits of medications.  Advised patient on orders and health maintenance.  We discussed old records and old labs if available.  Will request any records not available through epic.  Continue current medications listed on your summary sheet.           Hospital discharge follow-up - Primary    Overview     Endocrinology Progress Note    Zoran Corado is a 23 y.o. male seen for Diabetes Mellitus    HPI    Patient extubated today    BP still on low side    Hyperthyroidism/storm  -bedside US yesterday did show hypervascularity suggesting autonomous thyroid function  - PTU and lugols    -Patient reports very poor compliance with levothyroxine states he may have taken 1 tab in the last 2 weeks    Hyponatremia  Hypokalemia  -Sodium 143 at last check  - D5 started    JIM  -Creatinine stable at 1.8    DM Type 1 - last available A1c 8.9 this admission  -low glucose today    ROS  Review of systems limited, patient is oriented x 3 but very slow and provides minimal amount of information    Past Medical History:  Diagnosis Date  ADHD (attention deficit hyperactivity  disorder)  Behavioral disorder in pediatric patient  Chronic lymphocytic thyroiditis 11/03/2011  Cystic fibrosis (HCC)  Depression  Diabetes mellitus  DM (diabetes mellitus), type 1 (HCC)  Thyroid disease  Type 1 diabetes mellitus with ketoacidosis (HCC) 06/29/2018    No Known Allergies    Scheduled Meds:  cholestyramine-aspartame 4 g Per NG tube Q6H  enoxaparin 40 mg Subcutaneous Q24H  insulin glargine 15 Units Subcutaneous QAM  insulin lispro 0-10 Units Subcutaneous ACHS & 2AM - Humalog  iodine strong 8 drop Topical Q6H  pantoprazole 40 mg Oral Daily  piperacillin-tazobactam 3.375 g Intravenous Q8H  propranoloL 10 mg Per NG tube Q6H  propylthiouraciL 250 mg Per NG tube Q6H    Continuous Infusions:  sodium chloride (1/2 NS) 150 mL/hr at 01/13/24 1009  dexmedeTOMIDine in 0.9 % NaCL 0.2 mcg/kg/hr (01/13/24 0940)  dextrose 5 % (D5W) 150 mL/hr at 01/13/24 1322    PRN Meds:.  albuterol sulfate  dextrose 50 %  dextrose 50 %  HYDROmorphone    Vitals:  01/13/24 1200 01/13/24 1215 01/13/24 1230 01/13/24 1245  BP: 115/59 109/64 95/66 92/55  BP Location: Right arm  Patient Position: Lying  Pulse: 81 82 82 83  Resp: 16  Temp: 99 °F (37.2 °C)  TempSrc: Core  SpO2: 98% 99% 97% 98%  Weight:  Height:      Body mass index is 20.71 kg/m².    Physical Exam  Constitutional:  Appearance: He is well-developed.  Comments: Extubated  Mother at bedside  Cardiovascular:  Rate and Rhythm: Normal rate and regular rhythm.  Pulses: Normal pulses.  Heart sounds: Normal heart sounds.  Pulmonary:  Effort: Pulmonary effort is normal. No respiratory distress.  Breath sounds: Normal breath sounds. No stridor.  Abdominal:  General: Bowel sounds are normal. There is no distension.  Palpations: Abdomen is soft.  Tenderness: There is no abdominal tenderness.  Musculoskeletal:  Comments: Various bruises  Neurological:  Mental Status: He is oriented to person, place, and time.    Lab Results  Component Value Date  HGBA1C 8.9 (H) 01/11/2024      No  "components found for: "A1C"  No results found for: "[id number]"    Hyperthyroidism/storm  -Bedside ultrasound indicated hypervascularity which would be consistent with Graves' disease  -Reports poor med compliance    -Given this, despite previous thoughts that hyperthyroidism was from iatrogenic causes, does appear patient has hyperthyroidism/Graves'    -Given BP and pulse, will reduce beta-blocker  -Continue PTU with tentative plan to change to methimazole tomorrow  -Continue Lugol's for few more days  -Continue cholestyramine with plan to discontinue tomorrow    -Check TSI  -Formal thyroid ultrasound ordered but feels this is unlikely to be covered    DM 1 - last A1c 8.9 this admission  -Continue Lantus 15 units daily for basal requirements  -Reduce Humalog scale, changed ACHS to a.m.  -Start Humalog 3 units AC, can dose mealtime insulin immediately after the meal to ensure that meal is a    Hyponatremia  - now normal  - start D5 - recommoneded by nephrology  Electronically signed by Damian Moore MD at 01/13/2024 2:07 PM [facility] AM SUMMARY    Patient ID:  Zoran Corado  [id number]  23 y.o., 2000    Admit Date:  1/11/2024 6:29 AM    Admitting Physician:  Simón Pino MD    Attending Physician:  Simón Pino MD    Discharge Date:  Discharge Today: 1/15/2024    Discharge Physician:  EZEQUIEL ROGERS NP    Zoran Corado has made the decision to leave the facility against the advice of EZEQUIEL ROGERS NP and Dr. Simón Pino.  He has been informed and understands the inherent risks, including death. He has decided to accept the responsibility for this decision. The patient was discouraged from leaving the facility. The following were notified of patient desire to leave the facility against medical advice: ICU staff, Dr. Pino.    Zoran Corado and all necessary parties have been advised that he may return for further evaluation or treatment. His condition at time of discharge was " unknown-pt left and was not personally examined by me at the time he eloped . Zoran Corado had current vital signs as follows:    Last Filed Vitals  01/15/24 1400 01/15/24 1500 01/15/24 1600 01/15/24 1700  BP: 129/87 128/70 125/74 (!) 133/95  Pulse: 98 89 93 110  Resp: 18 16 18 20  Temp:  TempSrc:  SpO2: 100% 96% 100% 100%  Weight:  Height:  PainSc: 0-No pain 0-No pain 0-No pain 0-No pain    I saw this pt around lunch time for my 2nd visit alongside Dr. Pino. He had threatened to leave AMA and was unable to be reasoned with. I spent 45 minutes with him and finally convinced him to stay. His Hussein and central line were removed and he was even allowed to play his Xbox. He c/o severe anxiety and panic attacks and he was given IV Ativan 2mg as well as scheduled Xanax 1mg TID. In the end, this wasn't enough. I discussed the case with Cade Downey NP who was aware of the situation. Because he has sound mind and has mental capacity, we are unable to PEC him. Despite our best efforts to keep him from leaving, he signed out AMA. He will likely be back very soon and accepts that he may die. No prescriptions were provided to him. Please refer to the nurses notes outlining his exit from the hospital. This is a very unfortunate situation.  Electronically signed by Erin Phan NP at 01/15/2024 8:39 PM CST          Current Assessment & Plan     Patient will have appointments with Endocrinology, Nephrology and GI.  Transitional Care Note    Family and/or Caretaker present at visit?  No.  Diagnostic tests reviewed/disposition: No diagnosic tests pending after this hospitalization.  Disease/illness education:  Altered mental status, hyponatremia  Home health/community services discussion/referrals: Patient does not have home health established from hospital visit.  They do not need home health.  If needed, we will set up home health for the patient.   Establishment or re-establishment of referral orders for community  resources: No other necessary community resources.   Discussion with other health care providers: No discussion with other health care providers necessary.                Diabetes insipidus    Overview     New problem.  Patient reports patient was given fluids was being monitored with blood work every 4 hours.  Patient reportedly left the hospital AMA.         Current Assessment & Plan     Start DDAVP per consult note recommendation.  Patient will follow-up with Nephrology soon.  ER precautions for severe symptoms.    Monitor renal function and sodium levels.             Review of patient's allergies indicates:  No Known Allergies  Current Outpatient Medications   Medication Instructions    blood-glucose sensor (DEXCOM G6 SENSOR) Aylin Change every 10 days.    blood-glucose transmitter (DEXCOM G6 TRANSMITTER) Aylin Change every 90 days.    busPIRone (BUSPAR) 7.5 mg, Oral, 4 times daily PRN    desmopressin (DDAVP) 0.1 MG Tab Take 1/2 tab twice daily by mouth    famotidine (PEPCID) 40 mg, Oral, Nightly, , about an hour before bedtime.    GI cocktail antac/dicyc/lidoc 10 mLs, Oral, Every 4-6 hours PRN    insulin aspart U-100 (NOVOLOG) 100 unit/mL injection Pt uses 100 units a day via insulin pump    levothyroxine (SYNTHROID) 150 mcg, Oral, Before breakfast    losartan (COZAAR) 50 mg, Oral, Daily    miSOPROStoL (CYTOTEC) 100 mcg, Oral, Before meals & nightly    ondansetron (ZOFRAN-ODT) 8 MG TbDL 1 tablet, Oral, Every 8 hours PRN    prochlorperazine (COMPAZINE) 25 mg, Rectal, Every 12 hours PRN    promethazine (PHENERGAN) 12.5 mg, Oral, Every 6 hours PRN    sucralfate (CARAFATE) 1 g, Oral, Before meals & nightly      I have reviewed the PMH, social history, FamilyHx, surgical history, allergies and medications documented / confirmed by the patient at the time of this visit.  Review of Systems   Constitutional:  Positive for fatigue. Negative for chills, fever and unexpected weight change.   HENT:  Negative for ear pain and  "sore throat.    Eyes:  Negative for redness and visual disturbance.   Respiratory:  Negative for cough and shortness of breath.    Cardiovascular:  Negative for chest pain and palpitations.   Gastrointestinal:  Negative for nausea and vomiting.   Endocrine: Negative for cold intolerance and heat intolerance.   Genitourinary:  Negative for difficulty urinating and hematuria.   Musculoskeletal:  Negative for arthralgias and myalgias.   Skin:  Negative for rash and wound.   Allergic/Immunologic: Negative for environmental allergies and food allergies.   Neurological:  Negative for weakness and headaches.   Hematological:  Negative for adenopathy. Does not bruise/bleed easily.   Psychiatric/Behavioral:  Negative for sleep disturbance. The patient is not nervous/anxious.      Objective:   /70 (BP Location: Left arm, Patient Position: Sitting)   Pulse 101   Temp 98.2 °F (36.8 °C)   Resp 18   Ht 5' 8" (1.727 m)   Wt 58 kg (127 lb 14.4 oz)   SpO2 100%   BMI 19.45 kg/m²   Physical Exam  Vitals and nursing note reviewed.   Constitutional:       General: He is not in acute distress.     Appearance: He is well-developed. He is not diaphoretic.   HENT:      Head: Normocephalic and atraumatic.      Right Ear: External ear normal.      Left Ear: External ear normal.      Nose: Nose normal. No rhinorrhea.   Eyes:      Extraocular Movements: Extraocular movements intact.      Pupils: Pupils are equal, round, and reactive to light.   Cardiovascular:      Rate and Rhythm: Normal rate and regular rhythm.      Pulses: Normal pulses.   Pulmonary:      Effort: Pulmonary effort is normal. No respiratory distress.      Breath sounds: Normal breath sounds.   Abdominal:      General: Bowel sounds are normal.      Palpations: Abdomen is soft.   Musculoskeletal:         General: Normal range of motion.      Cervical back: Normal range of motion and neck supple.   Skin:     General: Skin is warm and dry.      Capillary Refill: " Capillary refill takes less than 2 seconds.      Findings: Lesion present. No rash.   Neurological:      General: No focal deficit present.      Mental Status: He is alert and oriented to person, place, and time. Mental status is at baseline.      Cranial Nerves: No cranial nerve deficit.      Motor: No weakness.      Gait: Gait normal.   Psychiatric:         Attention and Perception: He is attentive.         Mood and Affect: Mood normal. Mood is not anxious or depressed. Affect is not labile, blunt, angry or inappropriate.         Speech: He is communicative. Speech is not rapid and pressured, delayed, slurred or tangential.         Behavior: Behavior normal. Behavior is not agitated, slowed, aggressive, withdrawn, hyperactive or combative.         Thought Content: Thought content normal. Thought content is not paranoid or delusional. Thought content does not include homicidal or suicidal ideation. Thought content does not include homicidal or suicidal plan.         Cognition and Memory: Memory is not impaired.         Judgment: Judgment normal. Judgment is not impulsive or inappropriate.         Assessment:     1. Hospital discharge follow-up    2. Encounter for long-term (current) use of medications    3. Type 1 diabetes mellitus with hyperglycemia    4. Insulin pump status    5. Diabetes insipidus      MDM:   High medical complexity.  Moderate to high risk.  Total time: 45 minutes.  This includes total time spent on the encounter, which includes face to face time and non-face to face time preparing to see the patient (eg, review of previous medical records, tests), Obtaining and/or reviewing separately obtained history, documenting clinical information in the electronic or other health record, independently interpreting results (not separately reported)/communicating results to the patient/family/caregiver, and/or care coordination (not separately reported).    I have Reviewed and summarized old records.  I have  performed thorough medication reconciliation today and discussed risk and benefits of medications.  I have reviewed EKG, labs and discussed with patient.  All questions were answered.  I am requesting old records and will review them once they are available. Endocrine    I have signed for the following orders AND/OR meds.  Orders Placed This Encounter   Procedures    CBC Without Differential     Standing Status:   Future     Number of Occurrences:   1     Standing Expiration Date:   3/18/2025    Comprehensive Metabolic Panel     Standing Status:   Future     Number of Occurrences:   1     Standing Expiration Date:   3/18/2025    TSH     Standing Status:   Future     Number of Occurrences:   1     Standing Expiration Date:   3/18/2025    Hemoglobin A1C     Standing Status:   Future     Number of Occurrences:   1     Standing Expiration Date:   3/18/2025    Urinalysis     Standing Status:   Future     Number of Occurrences:   1     Standing Expiration Date:   3/18/2025     Order Specific Question:   Collection Type     Answer:   Urine, Clean Catch     Medications Ordered This Encounter   Medications    desmopressin (DDAVP) 0.1 MG Tab     Sig: Take 1/2 tab twice daily by mouth     Dispense:  20 tablet     Refill:  0        Follow up in about 6 months (around 7/18/2024), or if symptoms worsen or fail to improve, for Med refills.  Future Appointments       Date Provider Specialty Appt Notes    1/22/2024 José Miguel Russo Jr., MD Gastroenterology gastroparesis          If no improvement in symptoms or symptoms worsen, advised to call/follow-up at clinic or go to ER. Patient voiced understanding and all questions/concerns were addressed.   DISCLAIMER: This note was compiled by using a speech recognition dictation system and therefore please be aware that typographical / speech recognition errors can and do occur.  Please contact me if you see any errors specifically.    Nico Leonard M.D.       Office: [phone]    [address] [place] [address]  FAX: [phone]

## 2024-01-19 LAB
ALBUMIN SERPL BCP-MCNC: 3.3 G/DL (ref 3.5–5.2)
ALP SERPL-CCNC: 72 U/L (ref 55–135)
ALT SERPL W/O P-5'-P-CCNC: 25 U/L (ref 10–44)
ANION GAP SERPL CALC-SCNC: 8 MMOL/L (ref 8–16)
AST SERPL-CCNC: 27 U/L (ref 10–40)
BACTERIA #/AREA URNS AUTO: NORMAL /HPF
BILIRUB SERPL-MCNC: 0.2 MG/DL (ref 0.1–1)
BILIRUB UR QL STRIP: NEGATIVE
BUN SERPL-MCNC: 14 MG/DL (ref 6–20)
CALCIUM SERPL-MCNC: 9.3 MG/DL (ref 8.7–10.5)
CHLORIDE SERPL-SCNC: 105 MMOL/L (ref 95–110)
CLARITY UR REFRACT.AUTO: CLEAR
CO2 SERPL-SCNC: 25 MMOL/L (ref 23–29)
COLOR UR AUTO: YELLOW
CREAT SERPL-MCNC: 0.9 MG/DL (ref 0.5–1.4)
ERYTHROCYTE [DISTWIDTH] IN BLOOD BY AUTOMATED COUNT: 13.2 % (ref 11.5–14.5)
EST. GFR  (NO RACE VARIABLE): >60 ML/MIN/1.73 M^2
ESTIMATED AVG GLUCOSE: 209 MG/DL (ref 68–131)
GLUCOSE SERPL-MCNC: 58 MG/DL (ref 70–110)
GLUCOSE UR QL STRIP: ABNORMAL
HBA1C MFR BLD: 8.9 % (ref 4–5.6)
HCT VFR BLD AUTO: 31.7 % (ref 40–54)
HGB BLD-MCNC: 10.4 G/DL (ref 14–18)
HGB UR QL STRIP: NEGATIVE
KETONES UR QL STRIP: NEGATIVE
LEUKOCYTE ESTERASE UR QL STRIP: NEGATIVE
MCH RBC QN AUTO: 29.5 PG (ref 27–31)
MCHC RBC AUTO-ENTMCNC: 32.8 G/DL (ref 32–36)
MCV RBC AUTO: 90 FL (ref 82–98)
MICROSCOPIC COMMENT: NORMAL
NITRITE UR QL STRIP: NEGATIVE
PH UR STRIP: 7 [PH] (ref 5–8)
PLATELET # BLD AUTO: 553 K/UL (ref 150–450)
PMV BLD AUTO: 9.2 FL (ref 9.2–12.9)
POTASSIUM SERPL-SCNC: 4.7 MMOL/L (ref 3.5–5.1)
PROT SERPL-MCNC: 6.6 G/DL (ref 6–8.4)
PROT UR QL STRIP: NEGATIVE
RBC # BLD AUTO: 3.53 M/UL (ref 4.6–6.2)
RBC #/AREA URNS AUTO: 0 /HPF (ref 0–4)
SODIUM SERPL-SCNC: 138 MMOL/L (ref 136–145)
SP GR UR STRIP: 1.01 (ref 1–1.03)
SQUAMOUS #/AREA URNS AUTO: 0 /HPF
T4 FREE SERPL-MCNC: 0.88 NG/DL (ref 0.71–1.51)
TSH SERPL DL<=0.005 MIU/L-ACNC: 0.11 UIU/ML (ref 0.4–4)
URN SPEC COLLECT METH UR: ABNORMAL
WBC # BLD AUTO: 7.77 K/UL (ref 3.9–12.7)
WBC #/AREA URNS AUTO: 1 /HPF (ref 0–5)
YEAST UR QL AUTO: NORMAL

## 2024-01-19 NOTE — ASSESSMENT & PLAN NOTE
Start DDAVP per consult note recommendation.  Patient will follow-up with Nephrology soon.  ER precautions for severe symptoms.    Monitor renal function and sodium levels.

## 2024-01-19 NOTE — ASSESSMENT & PLAN NOTE
Follow-up closely with Endocrinology. We will plan to monitor hemoglobin A1c at designated intervals 3 to 6 months.  I recommend ongoing Education for diabetic diet and exercise protocol.  We will continue to monitor for side effects.    Please be advised of symptoms to monitor for and to notify me immediately if persistent or worsening.  Follow up with Ophthalmology/Optometry and Podiatry at least annually.

## 2024-01-19 NOTE — ASSESSMENT & PLAN NOTE
Recheck labs.  Complete history and physical was completed today.  Complete and thorough medication reconciliation was performed.  Discussed risks and benefits of medications.  Advised patient on orders and health maintenance.  We discussed old records and old labs if available.  Will request any records not available through epic.  Continue current medications listed on your summary sheet.

## 2024-01-19 NOTE — ASSESSMENT & PLAN NOTE
Patient will have appointments with Endocrinology, Nephrology and GI.  Transitional Care Note    Family and/or Caretaker present at visit?  No.  Diagnostic tests reviewed/disposition: No diagnosic tests pending after this hospitalization.  Disease/illness education:  Altered mental status, hyponatremia  Home health/community services discussion/referrals: Patient does not have home health established from hospital visit.  They do not need home health.  If needed, we will set up home health for the patient.   Establishment or re-establishment of referral orders for community resources: No other necessary community resources.   Discussion with other health care providers: No discussion with other health care providers necessary.

## 2024-01-20 NOTE — PROGRESS NOTES
Make follow-up lab appointment per recommendation below.  Check to see if patient has seen the results through my chart.  If not then,  #CALL THE PATIENT# to discuss results/see if they have questions and document verification of contact. Make F/U appt if needed. [phone]    #My interpretation that was sent to them through [facility]:  Zoran, I have reviewed your recent blood work.     Urinalysis is negative for infection.  Glucose consistent with diabetes.  Follow-up with Endocrinology.  Your complete blood count is abnormal.  There is mild anemia present.  Platelet counts are elevated likely due to recent medical condition in the hospital.  Your metabolic panel which shows your glucose, kidney function, electrolytes, and liver function is mostly within normal limits except for low glucose and low albumin.  Increase sugar with glucose if having low blood sugar.  ER precautions for severe symptoms.  Increase protein.  Sodium level is now normal.  Thyroid study is abnormal.  This levels consistent with too much thyroid hormone.  Reduce levothyroxine until follow-up with Endocrinology.  FT4 is within normal range.  Your hemoglobin A1c is abnormal.  Follow-up with Endocrinology for further evaluation and treatment of your diabetes.  This test is gold standard screening test for diabetes.  It is a measures 3 months of your average blood sugar.    =========================  Also please address any outstanding health maintenance that may be due: Pneumococcal Vaccines (Age 0-64)(2 - PPSV23 or PCV20) due on 12/07/2010  Eye Exam due on 12/05/2017  Foot Exam due on 04/11/2023  Influenza Vaccine(1) due on 09/01/2023  COVID-19 Vaccine(2 - 2023-24 season) due on 09/01/2023

## 2024-02-07 ENCOUNTER — OFFICE VISIT (OUTPATIENT)
Dept: FAMILY MEDICINE | Facility: CLINIC | Age: 24
End: 2024-02-07
Payer: MEDICAID

## 2024-02-07 VITALS
OXYGEN SATURATION: 100 % | DIASTOLIC BLOOD PRESSURE: 76 MMHG | TEMPERATURE: 99 F | HEART RATE: 92 BPM | BODY MASS INDEX: 20.19 KG/M2 | SYSTOLIC BLOOD PRESSURE: 129 MMHG | WEIGHT: 132.81 LBS

## 2024-02-07 DIAGNOSIS — L89.90 PRESSURE INJURY OF SKIN, UNSPECIFIED INJURY STAGE, UNSPECIFIED LOCATION: ICD-10-CM

## 2024-02-07 DIAGNOSIS — L08.9 SKIN INFECTION: Primary | ICD-10-CM

## 2024-02-07 PROCEDURE — 87070 CULTURE OTHR SPECIMN AEROBIC: CPT | Performed by: NURSE PRACTITIONER

## 2024-02-07 PROCEDURE — 3078F DIAST BP <80 MM HG: CPT | Mod: CPTII,,, | Performed by: NURSE PRACTITIONER

## 2024-02-07 PROCEDURE — 3052F HG A1C>EQUAL 8.0%<EQUAL 9.0%: CPT | Mod: CPTII,,, | Performed by: NURSE PRACTITIONER

## 2024-02-07 PROCEDURE — 3008F BODY MASS INDEX DOCD: CPT | Mod: CPTII,,, | Performed by: NURSE PRACTITIONER

## 2024-02-07 PROCEDURE — 99215 OFFICE O/P EST HI 40 MIN: CPT | Mod: PBBFAC,PO | Performed by: NURSE PRACTITIONER

## 2024-02-07 PROCEDURE — 3074F SYST BP LT 130 MM HG: CPT | Mod: CPTII,,, | Performed by: NURSE PRACTITIONER

## 2024-02-07 PROCEDURE — 1160F RVW MEDS BY RX/DR IN RCRD: CPT | Mod: CPTII,,, | Performed by: NURSE PRACTITIONER

## 2024-02-07 PROCEDURE — 99999 PR PBB SHADOW E&M-EST. PATIENT-LVL V: CPT | Mod: PBBFAC,,, | Performed by: NURSE PRACTITIONER

## 2024-02-07 PROCEDURE — 99213 OFFICE O/P EST LOW 20 MIN: CPT | Mod: S$PBB,,, | Performed by: NURSE PRACTITIONER

## 2024-02-07 PROCEDURE — 1159F MED LIST DOCD IN RCRD: CPT | Mod: CPTII,,, | Performed by: NURSE PRACTITIONER

## 2024-02-07 RX ORDER — MUPIROCIN 20 MG/G
OINTMENT TOPICAL 3 TIMES DAILY
Qty: 22 G | Refills: 0 | Status: SHIPPED | OUTPATIENT
Start: 2024-02-07 | End: 2024-02-17

## 2024-02-07 RX ORDER — DOXYCYCLINE HYCLATE 100 MG
100 TABLET ORAL 2 TIMES DAILY
Qty: 20 TABLET | Refills: 0 | Status: SHIPPED | OUTPATIENT
Start: 2024-02-07 | End: 2024-02-17

## 2024-02-07 RX ORDER — METHIMAZOLE 10 MG/1
10 TABLET ORAL
COMMUNITY
Start: 2024-01-22

## 2024-02-07 NOTE — PATIENT INSTRUCTIONS
"Keep area clean and dry  RTC as needed  Consider wound care  Avoid prolonged sun exposure while taking doxycycline  Report to ER immediately if symptoms worsen or persist    Doxycycline: Patient drug information  Access [employer] for additional drug information, tools, and databases.  Copyright 5372-0219 [employer] All rights reserved.  Contributor Disclosures  (For additional information see "Doxycycline: Drug information" and see "Doxycycline: Pediatric drug information")    You must carefully read the "Consumer Information Use and Disclaimer" below in order to understand and correctly use this information.  Brand Names: [place]  Acticlate [DSC];     Avidoxy;     Doryx;     Doryx MPC;     Doxy 100;     Lymepak [DSC];     Mondoxyne NL;     Morgidox [DSC];     Okebo [DSC];     Oracea;     TargaDOX;     Vibramycin    Brand Names: Nivia  APO-Doxy;     APO-Doxycycline MR;     Apprilon;     Doxycin;     Doxytab;     Periostat;     TIFFANY-Doxycycline;     TEVA-Doxycycline    What is this drug used for?  It is used to treat pimples (acne).  It is used to treat or prevent bacterial infections.  It is used to prevent malaria.  It is used to treat swelling of the tissue around the teeth (periodontitis). It is used with scaling and root planing.  It is used to treat rosacea.  It may be given to you for other reasons. Talk with the doctor.    What do I need to tell my doctor BEFORE I take this drug?  If you are allergic to this drug; any part of this drug; or any other drugs, foods, or substances. Tell your doctor about the allergy and what signs you had.  If you are taking any of these drugs: Acitretin, isotretinoin, or a penicillin.  If you are breast-feeding or plan to breast-feed. You may need to avoid breast-feeding.  This is not a list of all drugs or health problems that interact with this drug.  Tell your doctor and pharmacist about all of your drugs (prescription or OTC, natural products, vitamins) and health " problems. You must check to make sure that it is safe for you to take this drug with all of your drugs and health problems. Do not start, stop, or change the dose of any drug without checking with your doctor.    What are some things I need to know or do while I take this drug?  Tell all of your health care providers that you take this drug. This includes your doctors, nurses, pharmacists, and dentists.  Have your blood work checked if you are on this drug for a long time. Talk with your doctor.  This drug may affect certain lab tests. Tell all of your health care providers and lab workers that you take this drug.  Do not use longer than you have been told. A second infection may happen.  If you are allergic to sulfites, talk with your doctor. Some products have sulfites.  This drug may make you sunburn more easily. Use care if you will be in the sun. Tell your doctor if you sunburn easily while taking this drug.  Severe skin reactions may happen with this drug. These include Rhoades-Sorin syndrome (SJS), toxic epidermal necrolysis (TEN), and other serious reactions. Sometimes, body organs may also be affected. These reactions can be deadly. Get medical help right away if you have signs like red, swollen, blistered, or peeling skin; red or irritated eyes; sores in your mouth, throat, nose, eyes, genitals, or any areas of skin; fever; chills; body aches; shortness of breath; or swollen glands.  Birth control pills and other hormone-based birth control may not work as well to prevent pregnancy. Use some other kind of birth control also like a condom when taking this drug.  This drug may change tooth color to yellow-gray brown if taken by children younger than 8 years old, or in the unborn baby if taken during some parts of pregnancy. If this change of tooth color happens, it will not go away. Other tooth problems have also happened. Bone growth may also be affected in these people taking this drug. If you have  questions, talk with the doctor.  Most of the time, this drug is not for use in children younger than 8 years old. However, there may be times when these children may need to take this drug. Talk with the doctor.  Change in tooth color has also happened in adults. This has gone back to normal after this drug was stopped and teeth cleaning at a dentist's office. Talk with the doctor.  This drug may cause harm to the unborn baby if you take it while you are pregnant. If you are pregnant or you get pregnant while taking this drug, call your doctor right away.    What are some side effects that I need to call my doctor about right away?  WARNING/CAUTION: Even though it may be rare, some people may have very bad and sometimes deadly side effects when taking a drug. Tell your doctor or get medical help right away if you have any of the following signs or symptoms that may be related to a very bad side effect:  Signs of an allergic reaction, like rash; hives; itching; red, swollen, blistered, or peeling skin with or without fever; wheezing; tightness in the chest or throat; trouble breathing, swallowing, or talking; unusual hoarseness; or swelling of the mouth, face, lips, tongue, or throat.  Signs of liver problems like dark urine, tiredness, decreased appetite, upset stomach or stomach pain, light-colored stools, throwing up, or yellow skin or eyes.  Signs of a pancreas problem (pancreatitis) like very bad stomach pain, very bad back pain, or very bad upset stomach or throwing up.  Chest pain or pressure or a fast heartbeat.  Not able to pass urine or change in how much urine is passed.  Fever, chills, or sore throat; any unexplained bruising or bleeding; or feeling very tired or weak.  Throat irritation.  Trouble swallowing.  Muscle or joint pain.  Fast breathing.  Flushing.  Very bad dizziness or passing out.  Change in skin color.  Vaginal itching or discharge.  Diarrhea is common with antibiotics. Rarely, a severe  form called C diff-associated diarrhea (CDAD) may happen. Sometimes, this has led to a deadly bowel problem. CDAD may happen during or a few months after taking antibiotics. Call your doctor right away if you have stomach pain, cramps, or very loose, watery, or bloody stools. Check with your doctor before treating diarrhea.  Raised pressure in the brain has happened with this drug. Most of the time, this will go back to normal after this drug is stopped. Sometimes, loss of eyesight may happen and may not go away even after this drug is stopped. Call your doctor right away if you have a headache or eyesight problems like blurred eyesight, seeing double, or loss of eyesight.    What are some other side effects of this drug?  All drugs may cause side effects. However, many people have no side effects or only have minor side effects. Call your doctor or get medical help if any of these side effects or any other side effects bother you or do not go away:  Diarrhea, upset stomach, or throwing up.  Decreased appetite.  These are not all of the side effects that may occur. If you have questions about side effects, call your doctor. Call your doctor for medical advice about side effects.  You may report side effects to your national health agency.    How is this drug best taken?  Use this drug as ordered by your doctor. Read all information given to you. Follow all instructions closely.  All oral products:  Keep taking this drug as you have been told by your doctor or other health care provider, even if you feel well.  Some drugs may need to be taken with food or on an empty stomach. For some drugs it does not matter. Check with your pharmacist about how to take this drug.  It is best to avoid taking this drug at the same time as milk, dairy, or other products with calcium. This drug may not work as well. If you have questions, talk with the doctor or pharmacist.  Drink lots of noncaffeine liquids unless told to drink less  liquid by your doctor.  Do not take bismuth (Pepto-Bismol®), calcium, iron, magnesium, zinc, multivitamins with minerals, colestipol, cholestyramine, didanosine, or antacids within 2 hours of this drug.  Tablets and capsules:  Take with a full glass of water.  Do not lie down after taking this drug. This will help lower the chance of throat irritation. Ask your pharmacist how long before you can lie down after taking this drug.  Delayed-release tablets:  Swallow whole. Do not chew or crush.  The tablet may be broken if the doctor tells you to.  You may sprinkle contents of tablet on applesauce. Be careful to break the tablet without crushing the pellets. Do not chew, crush, or damage the contents of the tablet.  Do not mix with hot applesauce.  If mixed, swallow the mixed drug right away. Do not store for use at a later time.  All liquid products:  Measure liquid doses carefully. Use the measuring device that comes with this drug. If there is none, ask the pharmacist for a device to measure this drug.  Liquid (suspension):  Shake well before use.  Injection:  It is given as an infusion into a vein over a period of time.    What do I do if I miss a dose?  All oral products:  Take a missed dose as soon as you think about it.  If it is close to the time for your next dose, skip the missed dose and go back to your normal time.  Do not take 2 doses at the same time or extra doses.  Injection:  Call your doctor to find out what to do.    How do I store and/or throw out this drug?  All oral products:  Store at room temperature protected from light. Store in a dry place. Do not store in a bathroom.  Do not take this drug if it is outdated.  Do not take this drug if it has not been stored as you have been told.  Liquid (syrup):  Throw away any unused part of this drug.  Liquid (suspension):  Store liquid (suspension) at room temperature. Throw away any part not used after 2 weeks.  Injection:  If you need to store this drug  at home, talk with your doctor, nurse, or pharmacist about how to store it.  All products:  Keep all drugs in a safe place. Keep all drugs out of the reach of children and pets.  Throw away unused or  drugs. Do not flush down a toilet or pour down a drain unless you are told to do so. Check with your pharmacist if you have questions about the best way to throw out drugs. There may be drug take-back programs in your area.    General drug facts  If your symptoms or health problems do not get better or if they become worse, call your doctor.  Do not share your drugs with others and do not take anyone else's drugs.  Some drugs may have another patient information leaflet. If you have any questions about this drug, please talk with your doctor, nurse, pharmacist, or other health care provider.  If you think there has been an overdose, call your poison control center or get medical care right away. Be ready to tell or show what was taken, how much, and when it happened.      Geovanny Meehan,     If you are due for any health screening(s) below please notify me so we can arrange them to be ordered and scheduled. Most healthy patients at your age complete them, but you are free to accept or refuse.     If you can't do it, I'll definitely understand. If you can, I'd certainly appreciate it!    Tests to Keep You Healthy    Eye Exam: DUE  Last Blood Pressure <= 139/89 (2024): Yes  Last HbA1c < 8 (2024): NO  Tobacco Cessation: NO      Lets manage your A1c levels     Your last hemoglobin A1c was higher than the goal of less than 8. Hemoglobin A1c or HbA1c is a blood test that measures your average blood sugar levels over the past 3 months. It is the main test to help you and your health care team manage your diabetes.     Higher A1c levels are linked to diabetes complications, such as loss of vision, kidney disease, and nerve damage. Keeping your A1c at least less than 8 is important to stay healthy and we are here  to help. Talk with your provider on how you can further improve your A1c.     We recommend that you set up blood work to get a repeat hemoglobin A1c in 3 months to monitor your diabetes. Let your health care team know if you have questions.    Your diabetic retinal eye exam is due     Diabetes is the #1 cause of blindness in the [place] - early detection before signs or symptoms develop can prevent debilitating blindness.     Our records indicate that you may be overdue for your annual diabetic eye exam. Eye screening can help identify patients at risk for developing vision loss which is common in diabetes. This simple screening is an important step to keeping you healthy and preventing complications from diabetes.     This recommended diabetic eye exam should take place once per year and can prevent and treat diabetes complications in the eye before developing symptoms. This can be done with a special camera is used to take photographs of the back of your eye without having to dilate them, or you can see an eye doctor for a full dilated exam.     If you recently had your yearly diabetic eye exam performed outside of [facility], please let your Health care team know so that they can update your health record.        Were here to help you quit smoking     Our records indicated that you are still smoking. One of the best things you can do for your health is to stop smoking and we are here to help.     Talk with your provider about our Smoking Cessation Program and how we can support you on your journey.

## 2024-02-07 NOTE — PROGRESS NOTES
Subjective     Patient ID: Zoran Corado is a 23 y.o. male.    Chief Complaint: Wound Check    Wound Check  He was originally treated more than 14 days ago. Prior ED Treatment: Pt states has been using honey dressings, triamcinolone; states pressure ulcer following hospitalization in January. The maximum temperature noted was less than 100.4 F. The temperature was taken using an oral thermometer. There has been clear discharge from the wound. The redness has improved. The swelling has improved. Pain course: mild. He has no difficulty moving the affected extremity or digit.     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     Depression in pediatric patient 10/1/2015    Diabetes mellitus 3/1/2012    No prev history of DKA    GERD (gastroesophageal reflux disease)     Hashimoto's thyroiditis     Headache(784.0)     has frequent HA and sometimes responds to Ibuprofen    Hypothyroidism 8/17/2017    Mood disorder     Oppositional defiant disorder 10/1/2015    Otitis media     Pancreatitis     Pneumothorax     Suicidal ideation 1/10/2018    Thyroid disease     Wheezing      Social History     Socioeconomic History    Marital status: Single   Occupational History    Occupation: [employer]     Comment: works in [employer]   Tobacco Use    Smoking status: Every Day     Types: Vaping with nicotine    Smokeless tobacco: Never    Tobacco comments:     dad is former smoker   Substance and Sexual Activity    Alcohol use: Yes     Comment: ocasionally    Drug use: No    Sexual activity: Not Currently     Partners: Female     Birth control/protection: Condom   Social History Narrative    Lives with parents and sister.     Past Surgical History:   Procedure Laterality Date    ADENOIDECTOMY      ADENOIDECTOMY      BRONCHOSCOPY  06/20/2016         ESOPHAGOGASTRODUODENOSCOPY N/A 2/1/2023    Procedure: EGD  (ESOPHAGOGASTRODUODENOSCOPY);  Surgeon: José Miguel Russo Jr., MD;  Location: [facility];  Service: Endoscopy;  Laterality: N/A;    AL BRONCHOSCOPY,[id number] W LAVAGE  08/17/2017         TYMPANOSTOMY TUBE PLACEMENT  June of 2002    TYMPANOSTOMY TUBE PLACEMENT         Review of Systems   Constitutional: Negative.    HENT: Negative.     Eyes: Negative.    Respiratory: Negative.     Cardiovascular: Negative.    Gastrointestinal: Negative.    Endocrine: Negative.    Genitourinary: Negative.    Musculoskeletal: Negative.    Integumentary:  Positive for wound (Upper portion of right buttock).   Allergic/Immunologic: Negative.    Neurological: Negative.    Psychiatric/Behavioral: Negative.            Objective     Physical Exam  Vitals and nursing note reviewed.   Constitutional:       Appearance: Normal appearance.   HENT:      Head: Normocephalic.      Right Ear: Tympanic membrane, ear canal and external ear normal.      Left Ear: Tympanic membrane, ear canal and external ear normal.      Nose: Nose normal.      Mouth/Throat:      Mouth: Mucous membranes are moist.      Pharynx: Oropharynx is clear.   Eyes:      Conjunctiva/sclera: Conjunctivae normal.      Pupils: Pupils are equal, round, and reactive to light.   Cardiovascular:      Rate and Rhythm: Normal rate and regular rhythm.      Pulses: Normal pulses.      Heart sounds: Normal heart sounds.   Pulmonary:      Effort: Pulmonary effort is normal.      Breath sounds: Normal breath sounds.   Abdominal:      General: Bowel sounds are normal.      Palpations: Abdomen is soft.   Musculoskeletal:         General: Normal range of motion.      Cervical back: Normal range of motion and neck supple.   Skin:     General: Skin is warm and dry.      Capillary Refill: Capillary refill takes 2 to 3 seconds.      Findings: Lesion (upper portion of left inner buttock; small amount of serous drainage noted) present.   Neurological:      Mental Status: He is alert and oriented to  person, place, and time.   Psychiatric:         Mood and Affect: Mood normal.         Behavior: Behavior normal.         Thought Content: Thought content normal.         Judgment: Judgment normal.            Assessment and Plan     1. Skin infection  2. Pressure injury of skin, unspecified injury stage, unspecified location  -     Aerobic culture  -     mupirocin (BACTROBAN) 2 % ointment; Apply topically 3 (three) times daily. for 10 days  Dispense: 22 g; Refill: 0  -     doxycycline (VIBRA-TABS) 100 MG tablet; Take 1 tablet (100 mg total) by mouth 2 (two) times daily. for 10 days  Dispense: 20 tablet; Refill: 0  Keep area clean and dry  RTC as needed  Consider wound care  Report to ER immediately if symptoms worsen or persist                 No follow-ups on file.

## 2024-02-10 LAB — BACTERIA SPEC AEROBE CULT: NORMAL

## 2024-02-27 ENCOUNTER — TELEPHONE (OUTPATIENT)
Dept: FAMILY MEDICINE | Facility: CLINIC | Age: 24
End: 2024-02-27
Payer: COMMERCIAL

## 2024-02-27 NOTE — TELEPHONE ENCOUNTER
"Unable to schedule due to pt having medicaid, please schedule on Friday at 920 in office with shavonne for anxiety and labs. The 920 spot looked to be an "any" spot  "

## 2024-02-27 NOTE — TELEPHONE ENCOUNTER
----- Message from Rubén Becker sent at 2/27/2024  9:38 AM CST -----  Contact: [phone]  Pt is calling regarding appt . Please call back at [phone] . Thanksdj

## 2024-03-01 ENCOUNTER — LAB VISIT (OUTPATIENT)
Dept: LAB | Facility: HOSPITAL | Age: 24
End: 2024-03-01
Attending: NURSE PRACTITIONER
Payer: COMMERCIAL

## 2024-03-01 ENCOUNTER — OFFICE VISIT (OUTPATIENT)
Dept: FAMILY MEDICINE | Facility: CLINIC | Age: 24
End: 2024-03-01
Payer: MEDICAID

## 2024-03-01 VITALS
HEART RATE: 110 BPM | HEIGHT: 68 IN | WEIGHT: 142 LBS | SYSTOLIC BLOOD PRESSURE: 129 MMHG | DIASTOLIC BLOOD PRESSURE: 87 MMHG | BODY MASS INDEX: 21.52 KG/M2

## 2024-03-01 DIAGNOSIS — Z79.899 ENCOUNTER FOR LONG-TERM (CURRENT) USE OF MEDICATIONS: Chronic | ICD-10-CM

## 2024-03-01 DIAGNOSIS — F41.1 GENERALIZED ANXIETY DISORDER: Primary | ICD-10-CM

## 2024-03-01 DIAGNOSIS — D64.9 ANEMIA, UNSPECIFIED TYPE: ICD-10-CM

## 2024-03-01 DIAGNOSIS — R68.82 DECREASED SEX DRIVE: ICD-10-CM

## 2024-03-01 DIAGNOSIS — F41.1 GENERALIZED ANXIETY DISORDER: ICD-10-CM

## 2024-03-01 PROBLEM — F32.A ANXIETY AND DEPRESSION: Status: ACTIVE | Noted: 2018-01-10

## 2024-03-01 PROBLEM — F41.9 ANXIETY AND DEPRESSION: Status: ACTIVE | Noted: 2018-01-10

## 2024-03-01 LAB
ALBUMIN SERPL BCP-MCNC: 4.3 G/DL (ref 3.5–5.2)
ALP SERPL-CCNC: 105 U/L (ref 55–135)
ALT SERPL W/O P-5'-P-CCNC: 33 U/L (ref 10–44)
ANION GAP SERPL CALC-SCNC: 10 MMOL/L (ref 8–16)
AST SERPL-CCNC: 45 U/L (ref 10–40)
BASOPHILS # BLD AUTO: 0.06 K/UL (ref 0–0.2)
BASOPHILS NFR BLD: 1.1 % (ref 0–1.9)
BILIRUB SERPL-MCNC: 0.5 MG/DL (ref 0.1–1)
BUN SERPL-MCNC: 25 MG/DL (ref 6–20)
CALCIUM SERPL-MCNC: 10.3 MG/DL (ref 8.7–10.5)
CHLORIDE SERPL-SCNC: 102 MMOL/L (ref 95–110)
CO2 SERPL-SCNC: 23 MMOL/L (ref 23–29)
CREAT SERPL-MCNC: 1.5 MG/DL (ref 0.5–1.4)
DIFFERENTIAL METHOD BLD: ABNORMAL
EOSINOPHIL # BLD AUTO: 0.3 K/UL (ref 0–0.5)
EOSINOPHIL NFR BLD: 4.5 % (ref 0–8)
ERYTHROCYTE [DISTWIDTH] IN BLOOD BY AUTOMATED COUNT: 13 % (ref 11.5–14.5)
EST. GFR  (NO RACE VARIABLE): >60 ML/MIN/1.73 M^2
FERRITIN SERPL-MCNC: 101 NG/ML (ref 20–300)
FOLATE SERPL-MCNC: 10.1 NG/ML (ref 4–24)
GLUCOSE SERPL-MCNC: 168 MG/DL (ref 70–110)
HCT VFR BLD AUTO: 41.3 % (ref 40–54)
HGB BLD-MCNC: 13.9 G/DL (ref 14–18)
IMM GRANULOCYTES # BLD AUTO: 0.01 K/UL (ref 0–0.04)
IMM GRANULOCYTES NFR BLD AUTO: 0.2 % (ref 0–0.5)
IRON SERPL-MCNC: 81 UG/DL (ref 45–160)
LYMPHOCYTES # BLD AUTO: 2.1 K/UL (ref 1–4.8)
LYMPHOCYTES NFR BLD: 38.7 % (ref 18–48)
MCH RBC QN AUTO: 29.4 PG (ref 27–31)
MCHC RBC AUTO-ENTMCNC: 33.7 G/DL (ref 32–36)
MCV RBC AUTO: 88 FL (ref 82–98)
MONOCYTES # BLD AUTO: 0.4 K/UL (ref 0.3–1)
MONOCYTES NFR BLD: 7.6 % (ref 4–15)
NEUTROPHILS # BLD AUTO: 2.6 K/UL (ref 1.8–7.7)
NEUTROPHILS NFR BLD: 47.9 % (ref 38–73)
NRBC BLD-RTO: 0 /100 WBC
PLATELET # BLD AUTO: 439 K/UL (ref 150–450)
PMV BLD AUTO: 9.4 FL (ref 9.2–12.9)
POTASSIUM SERPL-SCNC: 4.5 MMOL/L (ref 3.5–5.1)
PROT SERPL-MCNC: 7.6 G/DL (ref 6–8.4)
RBC # BLD AUTO: 4.72 M/UL (ref 4.6–6.2)
SATURATED IRON: 18 % (ref 20–50)
SODIUM SERPL-SCNC: 135 MMOL/L (ref 136–145)
TOTAL IRON BINDING CAPACITY: 454 UG/DL (ref 250–450)
TRANSFERRIN SERPL-MCNC: 307 MG/DL (ref 200–375)
VIT B12 SERPL-MCNC: 680 PG/ML (ref 210–950)
WBC # BLD AUTO: 5.51 K/UL (ref 3.9–12.7)

## 2024-03-01 PROCEDURE — 3074F SYST BP LT 130 MM HG: CPT | Mod: CPTII,,, | Performed by: NURSE PRACTITIONER

## 2024-03-01 PROCEDURE — 83540 ASSAY OF IRON: CPT | Performed by: NURSE PRACTITIONER

## 2024-03-01 PROCEDURE — 99999 PR PBB SHADOW E&M-EST. PATIENT-LVL IV: CPT | Mod: PBBFAC,,, | Performed by: NURSE PRACTITIONER

## 2024-03-01 PROCEDURE — 3052F HG A1C>EQUAL 8.0%<EQUAL 9.0%: CPT | Mod: CPTII,,, | Performed by: NURSE PRACTITIONER

## 2024-03-01 PROCEDURE — 82728 ASSAY OF FERRITIN: CPT | Performed by: NURSE PRACTITIONER

## 2024-03-01 PROCEDURE — 1160F RVW MEDS BY RX/DR IN RCRD: CPT | Mod: CPTII,,, | Performed by: NURSE PRACTITIONER

## 2024-03-01 PROCEDURE — 82746 ASSAY OF FOLIC ACID SERUM: CPT | Performed by: NURSE PRACTITIONER

## 2024-03-01 PROCEDURE — 80053 COMPREHEN METABOLIC PANEL: CPT | Performed by: NURSE PRACTITIONER

## 2024-03-01 PROCEDURE — 82607 VITAMIN B-12: CPT | Performed by: NURSE PRACTITIONER

## 2024-03-01 PROCEDURE — 99214 OFFICE O/P EST MOD 30 MIN: CPT | Mod: S$PBB,,, | Performed by: NURSE PRACTITIONER

## 2024-03-01 PROCEDURE — 85025 COMPLETE CBC W/AUTO DIFF WBC: CPT | Performed by: NURSE PRACTITIONER

## 2024-03-01 PROCEDURE — 3079F DIAST BP 80-89 MM HG: CPT | Mod: CPTII,,, | Performed by: NURSE PRACTITIONER

## 2024-03-01 PROCEDURE — 82040 ASSAY OF SERUM ALBUMIN: CPT | Mod: 91 | Performed by: NURSE PRACTITIONER

## 2024-03-01 PROCEDURE — 1159F MED LIST DOCD IN RCRD: CPT | Mod: CPTII,,, | Performed by: NURSE PRACTITIONER

## 2024-03-01 PROCEDURE — 99214 OFFICE O/P EST MOD 30 MIN: CPT | Mod: PBBFAC,PO | Performed by: NURSE PRACTITIONER

## 2024-03-01 PROCEDURE — 36415 COLL VENOUS BLD VENIPUNCTURE: CPT | Mod: PO | Performed by: NURSE PRACTITIONER

## 2024-03-01 PROCEDURE — 3008F BODY MASS INDEX DOCD: CPT | Mod: CPTII,,, | Performed by: NURSE PRACTITIONER

## 2024-03-01 RX ORDER — ESCITALOPRAM OXALATE 10 MG/1
10 TABLET ORAL DAILY
Qty: 30 TABLET | Refills: 11 | Status: SHIPPED | OUTPATIENT
Start: 2024-03-01 | End: 2025-03-01

## 2024-03-01 NOTE — ASSESSMENT & PLAN NOTE
Start Lexapro. Follow up in 4 weeks virtually. Psychiatry referral placed.     Please be advised of condition course.  SNRI/SSRI is first-line treatment for this condition.  Please be advised of the risk of discontinuing this medication without tapering/contacting MD.  Patient has been advised of side effects, and all questions were answered.  Patient voiced understanding.  Patient will follow up routinely and notify us if having any side effects or worsening or persistent symptoms.  ER precautions were given. Antidepressant/Antianxiety Medication Initiation:  Patient informed of risks, benefits, and potential side effects of medication and accepts informed consent.  Common side effects include nausea, fatigue, headache, insomnia, etc see medication insert for complete side effect profile.  Most importantly be advised of the possibility of new or worsening suicidal thoughts/depression/anxiety etcetera.  Please be advised to stop the medication immediately and seek urgent treatment if this occurs.  Therefore please do not to abruptly discontinue medication without physician guidance except in cases of sudden onset or worsening of SI.

## 2024-03-01 NOTE — PROGRESS NOTES
"Assessment/Plan:  Problem List Items Addressed This Visit          Psychiatric    Anxiety and depression - Primary    Overview     Chronic. Uncontrolled. Not currently taking anything. He has failed buspar. He is wanting to start medication today. He was following with Dr. Berrios since 9 y/o and reports she no longer accepts his insurance. He would like to see Cade Downey, psych NP at [facility]. He struggles with both anxiety and depression. Denies SI/HI.          Current Assessment & Plan     Start Lexapro. Follow up in 4 weeks virtually. Psychiatry referral placed.     Please be advised of condition course.  SNRI/SSRI is first-line treatment for this condition.  Please be advised of the risk of discontinuing this medication without tapering/contacting MD.  Patient has been advised of side effects, and all questions were answered.  Patient voiced understanding.  Patient will follow up routinely and notify us if having any side effects or worsening or persistent symptoms.  ER precautions were given. Antidepressant/Antianxiety Medication Initiation:  Patient informed of risks, benefits, and potential side effects of medication and accepts informed consent.  Common side effects include nausea, fatigue, headache, insomnia, etc see medication insert for complete side effect profile.  Most importantly be advised of the possibility of new or worsening suicidal thoughts/depression/anxiety etcetera.  Please be advised to stop the medication immediately and seek urgent treatment if this occurs.  Therefore please do not to abruptly discontinue medication without physician guidance except in cases of sudden onset or worsening of SI.          Relevant Medications    EScitalopram oxalate (LEXAPRO) 10 MG tablet       Renal/    Decreased sex drive    Overview     Patient reports feeling fatigued and having decreased sex drive. He is requesting to have his testosterone checked.    No results found for: "TESTOSTERONE", "TOTALTESTOST", " ""BIOTESTO"         Current Assessment & Plan     Testosterone panel ordered.         Relevant Orders    TESTOSTERONE PANEL       Oncology    Anemia    Overview     Lab Results   Component Value Date    WBC 7.77 01/18/2024    HGB 10.4 (L) 01/18/2024    HCT 31.7 (L) 01/18/2024    MCV 90 01/18/2024     (H) 01/18/2024   No results found for: "UIBC", "IRON", "TRANS", "TRANSFERRIN", "TIBC", "LABIRON", "FESATURATED"   No results found for: "FOLATE"  No results found for: "[id number]"   No results found for: "FERRITIN"         Current Assessment & Plan     Update labs. Check iron studies.          Relevant Orders    CBC Auto Differential    IRON AND TIBC    FOLATE    FERRITIN    VITAMIN B12       Other    Encounter for long-term (current) use of medications (Chronic)    Overview     March 2024: reviewed labs.  January 2024: Reviewed labs.  CHRONIC. Stable. Compliant with medications for managed conditions. See medication list. No SE reported.   Routine lab analysis is being monitored. Refills were addressed.  Lab Results   Component Value Date    WBC 7.77 01/18/2024    HGB 10.4 (L) 01/18/2024    HCT 31.7 (L) 01/18/2024    MCV 90 01/18/2024     (H) 01/18/2024       Chemistry        Component Value Date/Time     (L) 02/27/2024 1533     01/18/2024 1126    K 3.9 02/27/2024 1533    K 4.7 01/18/2024 1126     01/18/2024 1126    CO2 31 02/27/2024 1533    CO2 25 01/18/2024 1126    BUN 22 (H) 02/27/2024 1533    BUN 14 01/18/2024 1126    CREATININE 1.32 (H) 02/27/2024 1533    CREATININE 0.9 01/18/2024 1126    GLU 58 (L) 01/18/2024 1126    GLU >500 09/09/2018 0433        Component Value Date/Time    CALCIUM 9.2 02/27/2024 1533    CALCIUM 9.3 01/18/2024 1126    ALKPHOS 110 02/27/2024 1533    ALKPHOS 72 01/18/2024 1126    AST 26 02/27/2024 1533    AST 27 01/18/2024 1126    AST 30 04/12/2016 1113    ALT 25 02/27/2024 1533    ALT 25 01/18/2024 1126    BILITOT 0.7 02/27/2024 1533    BILITOT 0.2 01/18/2024 " "1126    ESTGFRAFRICA >60 07/08/2022 1342    EGFRNONAA >60 07/08/2022 1342        Lab Results   Component Value Date    TSH 0.114 (L) 01/18/2024    FREET4 0.88 01/18/2024            Current Assessment & Plan     Labs today. Complete history and physical was completed today.  Complete and thorough medication reconciliation was performed.  Discussed risks and benefits of medications.  Advised patient on orders and health maintenance.  We discussed old records and old labs if available.  Will request any records not available through epic.  Continue current medications listed on your summary sheet.          Follow up in about 4 weeks (around 3/29/2024), or if symptoms worsen or fail to improve, for Virtual Visit, follow up with me.  ER precautions for severe or worsening symptoms.      Bianca Jacome NP  _____________________________________________________________________________________________________________________________________________________    CC: anxiety    HPI: Patient is a 23-year-old male who presents in clinic today as an established patient here for anxiety and depression.  He has the following symptoms: difficulty concentrating, irritable, racing thoughts, feeling sad/depressed. Onset of symptoms was approximately several years ago, gradually worsening since that time. He denies current suicidal and homicidal ideation. Risk factors: negative life event - increase life stress; stress about health.  Previous treatment includes BuSpar and individual therapy with Dr. Berrios. He reports he has been seeing psychiatry since 8 years old but she no longer accepts his insurance. He would like to see Cade Downey NP.  He complains of the following side effects from the treatment: none.    He also reports decreased sex drive, fatigue. Ongoing for months. He is requesting to have testosterone checked.   No results found for: "TESTOSTERONE", "TOTALTESTOST", "BIOTESTO"    Past Medical History:  Past Medical History: "   Diagnosis Date    ADHD 7/13/2012    ADHD (attention deficit hyperactivity disorder)     Anxiety     Constipation     Cystic fibrosis gene carrier     Depression in pediatric patient 10/1/2015    Diabetes mellitus 3/1/2012    No prev history of DKA    GERD (gastroesophageal reflux disease)     Hashimoto's thyroiditis     Headache(784.0)     has frequent HA and sometimes responds to Ibuprofen    Hypothyroidism 8/17/2017    Mood disorder     Oppositional defiant disorder 10/1/2015    Otitis media     Pancreatitis     Pneumothorax     Suicidal ideation 1/10/2018    Thyroid disease     Wheezing      Past Surgical History:   Procedure Laterality Date    ADENOIDECTOMY      ADENOIDECTOMY      BRONCHOSCOPY  06/20/2016         ESOPHAGOGASTRODUODENOSCOPY N/A 2/1/2023    Procedure: EGD (ESOPHAGOGASTRODUODENOSCOPY);  Surgeon: José Miguel Russo Jr., MD;  Location: [facility];  Service: Endoscopy;  Laterality: N/A;    VA BRONCHOSCOPY,[id number] W LAVAGE  08/17/2017         TYMPANOSTOMY TUBE PLACEMENT  June of 2002    TYMPANOSTOMY TUBE PLACEMENT       Review of patient's allergies indicates:   Allergen Reactions    Losartan potassium Anaphylaxis     Social History     Tobacco Use    Smoking status: Every Day     Types: Vaping with nicotine    Smokeless tobacco: Never    Tobacco comments:     dad is former smoker   Substance Use Topics    Alcohol use: Yes     Comment: ocasionally    Drug use: No     Family History   Problem Relation Age of Onset    Cystic fibrosis Sister     Cystic fibrosis gene carrier Sister     Diabetes Neg Hx     Asthma Neg Hx     Cancer Neg Hx     Early death Neg Hx     Heart disease Neg Hx     Kidney disease Neg Hx     Hypertension Neg Hx     Thyroid disease Neg Hx      Current Outpatient Medications on File Prior to Visit   Medication Sig Dispense Refill    blood-glucose sensor (DEXCOM G6 SENSOR) Aylin Change every 10 days. 9 each 3    blood-glucose transmitter (DEXCOM G6 TRANSMITTER) Aylin Change every 90 days.  1 each 3    desmopressin (DDAVP) 0.1 MG Tab Take 1/2 tab twice daily by mouth 20 tablet 0    famotidine (PEPCID) 40 MG tablet Take 1 tablet (40 mg total) by mouth every evening. , about an hour before bedtime. 90 tablet 3    insulin aspart U-100 (NOVOLOG) 100 unit/mL injection Pt uses 100 units a day via insulin pump 30 mL 12    ondansetron (ZOFRAN-ODT) 8 MG TbDL Take 1 tablet by mouth every 8 (eight) hours as needed.      prochlorperazine (COMPAZINE) 25 MG suppository Place 1 suppository (25 mg total) rectally every 12 (twelve) hours as needed for Nausea. 12 suppository 0    promethazine (PHENERGAN) 12.5 MG Tab Take 1 tablet (12.5 mg total) by mouth every 6 (six) hours as needed (nausea and vomiting). 30 tablet 1    methIMAzole (TAPAZOLE) 10 MG Tab Take 10 mg by mouth.      [DISCONTINUED] busPIRone (BUSPAR) 15 MG tablet Take 7.5 mg by mouth 4 (four) times daily as needed.      [DISCONTINUED] levothyroxine (SYNTHROID) 150 MCG tablet Take 1 tablet (150 mcg total) by mouth before breakfast. 30 tablet 1    [DISCONTINUED] losartan (COZAAR) 50 MG tablet Take 1 tablet (50 mg total) by mouth once daily. 90 tablet 4    [DISCONTINUED] miSOPROStoL (CYTOTEC) 100 MCG Tab Take 1 tablet (100 mcg total) by mouth 4 (four) times daily before meals and nightly. 120 tablet 11     No current facility-administered medications on file prior to visit.     Review of Systems   Constitutional:  Positive for fatigue. Negative for chills, fever and unexpected weight change.   HENT:  Negative for ear pain and sore throat.    Eyes:  Negative for redness and visual disturbance.   Respiratory:  Negative for cough and shortness of breath.    Cardiovascular:  Negative for chest pain and palpitations.   Gastrointestinal:  Negative for nausea and vomiting.   Endocrine: Negative for cold intolerance and heat intolerance.   Genitourinary:  Negative for difficulty urinating and hematuria.        +decreased sex drive   Musculoskeletal:  Negative for  "arthralgias and myalgias.   Skin:  Negative for rash and wound.   Allergic/Immunologic: Negative for environmental allergies and food allergies.   Neurological:  Negative for weakness and headaches.   Hematological:  Negative for adenopathy. Does not bruise/bleed easily.   Psychiatric/Behavioral:  Negative for sleep disturbance. The patient is not nervous/anxious.      Vitals:    03/01/24 0919   BP: 129/87   Pulse: 110   Weight: 64.4 kg (142 lb)   Height: 5' 8" (1.727 m)     Wt Readings from Last 3 Encounters:   03/01/24 64.4 kg (142 lb)   02/07/24 60.2 kg (132 lb 12.8 oz)   01/18/24 58 kg (127 lb 14.4 oz)     Physical Exam  Vitals reviewed.   Constitutional:       General: He is not in acute distress.     Appearance: Normal appearance. He is not ill-appearing.   HENT:      Head: Normocephalic and atraumatic.      Right Ear: External ear normal.      Left Ear: External ear normal.      Nose: Nose normal.   Eyes:      Extraocular Movements: Extraocular movements intact.      Conjunctiva/sclera: Conjunctivae normal.   Cardiovascular:      Rate and Rhythm: Normal rate.      Heart sounds: Normal heart sounds.   Pulmonary:      Effort: Pulmonary effort is normal. No respiratory distress.      Breath sounds: Normal breath sounds.   Abdominal:      General: Abdomen is flat. There is no distension.   Musculoskeletal:         General: Normal range of motion.      Cervical back: Normal range of motion.   Skin:     General: Skin is warm and dry.      Capillary Refill: Capillary refill takes less than 2 seconds.      Coloration: Skin is not pale.      Findings: No rash.   Neurological:      General: No focal deficit present.      Mental Status: He is alert and oriented to person, place, and time. Mental status is at baseline.   Psychiatric:         Mood and Affect: Mood normal.         Speech: Speech normal. Speech is not rapid and pressured, delayed or slurred.         Behavior: Behavior normal. Behavior is not agitated, " slowed, aggressive, withdrawn, hyperactive or combative. Behavior is cooperative.         Thought Content: Thought content normal.         Judgment: Judgment normal.       Health Maintenance   Topic Date Due    Eye Exam  12/05/2017    Foot Exam  04/11/2023    Hemoglobin A1c  04/18/2024    Lipid Panel  09/22/2024    TETANUS VACCINE  05/26/2032    Hepatitis C Screening  Completed    HPV Vaccines  Completed

## 2024-03-04 ENCOUNTER — PATIENT MESSAGE (OUTPATIENT)
Dept: FAMILY MEDICINE | Facility: CLINIC | Age: 24
End: 2024-03-04
Payer: COMMERCIAL

## 2024-03-14 LAB
ALBUMIN SERPL-MCNC: 4.6 G/DL (ref 3.6–5.1)
SHBG SERPL-SCNC: 34 NMOL/L (ref 10–50)
TESTOST FREE SERPL-MCNC: 92.8 PG/ML (ref 46–224)
TESTOST SERPL-MCNC: 672 NG/DL (ref 250–1100)
TESTOSTERONE.FREE+WB SERPL-MCNC: 194.8 NG/DL

## 2024-03-19 ENCOUNTER — LAB VISIT (OUTPATIENT)
Dept: LAB | Facility: HOSPITAL | Age: 24
End: 2024-03-19
Attending: FAMILY MEDICINE
Payer: COMMERCIAL

## 2024-03-19 DIAGNOSIS — Z79.899 ENCOUNTER FOR LONG-TERM (CURRENT) USE OF MEDICATIONS: ICD-10-CM

## 2024-03-19 DIAGNOSIS — E10.65 TYPE 1 DIABETES MELLITUS WITH HYPERGLYCEMIA: ICD-10-CM

## 2024-03-19 DIAGNOSIS — Z09 HOSPITAL DISCHARGE FOLLOW-UP: ICD-10-CM

## 2024-03-19 LAB
ALBUMIN SERPL BCP-MCNC: 3.8 G/DL (ref 3.5–5.2)
ALP SERPL-CCNC: 91 U/L (ref 55–135)
ALT SERPL W/O P-5'-P-CCNC: 24 U/L (ref 10–44)
ANION GAP SERPL CALC-SCNC: 7 MMOL/L (ref 8–16)
AST SERPL-CCNC: 30 U/L (ref 10–40)
BILIRUB SERPL-MCNC: 0.5 MG/DL (ref 0.1–1)
BUN SERPL-MCNC: 18 MG/DL (ref 6–20)
CALCIUM SERPL-MCNC: 9.4 MG/DL (ref 8.7–10.5)
CHLORIDE SERPL-SCNC: 102 MMOL/L (ref 95–110)
CO2 SERPL-SCNC: 29 MMOL/L (ref 23–29)
CREAT SERPL-MCNC: 1.3 MG/DL (ref 0.5–1.4)
EST. GFR  (NO RACE VARIABLE): >60 ML/MIN/1.73 M^2
GLUCOSE SERPL-MCNC: 278 MG/DL (ref 70–110)
POTASSIUM SERPL-SCNC: 4.6 MMOL/L (ref 3.5–5.1)
PROT SERPL-MCNC: 6.6 G/DL (ref 6–8.4)
SODIUM SERPL-SCNC: 138 MMOL/L (ref 136–145)

## 2024-03-19 PROCEDURE — 36415 COLL VENOUS BLD VENIPUNCTURE: CPT | Mod: PO | Performed by: FAMILY MEDICINE

## 2024-03-19 PROCEDURE — 80053 COMPREHEN METABOLIC PANEL: CPT | Performed by: FAMILY MEDICINE

## 2024-03-21 NOTE — PROGRESS NOTES
Make follow-up lab appointment per recommendation below.  Check to see if patient has seen the results through my chart.  If not then,  #CALL THE PATIENT# to discuss results/see if they have questions and document verification of contact. Make F/U appt if needed. [phone]    #My interpretation that was sent to them through [employer]:  Zoran, I have reviewed your recent blood work.     Your metabolic panel which shows your glucose, kidney function, electrolytes, and liver function is improved from previous.  Glucose is significantly elevated consistent uncontrolled diabetes.  Follow-up with Endocrinology..   =========================  Also please address any outstanding health maintenance that may be due: Pneumococcal Vaccines (Age 0-64)(2 of 2 - PPSV23 or PCV20) due on 12/07/2010  Eye Exam due on 12/05/2017  Foot Exam due on 04/11/2023  Influenza Vaccine(1) due on 09/01/2023  COVID-19 Vaccine(2 - 2023-24 season) due on 09/01/2023  Hemoglobin A1c due on 04/18/2024

## 2024-03-27 NOTE — TELEPHONE ENCOUNTER
I have signed for the following orders AND/OR meds.  Please call the patient and ask the patient to schedule the testing AND/OR inform about any medications that were sent.      Orders Placed This Encounter   Procedures    Iron and TIBC     Standing Status:   Future     Standing Expiration Date:   11/24/2024    Ferritin     Standing Status:   Future     Standing Expiration Date:   11/24/2024    Vitamin B12     Standing Status:   Future     Standing Expiration Date:   11/24/2024    Folate     Standing Status:   Future     Standing Expiration Date:   11/24/2024    CBC Auto Differential     Standing Status:   Future     Standing Expiration Date:   11/24/2024           Noted. Chart and wait list updated for 3 year interval colonoscopy.   Results letter mailed.

## 2024-04-15 ENCOUNTER — TELEPHONE (OUTPATIENT)
Dept: FAMILY MEDICINE | Facility: CLINIC | Age: 24
End: 2024-04-15
Payer: COMMERCIAL

## 2024-04-15 NOTE — TELEPHONE ENCOUNTER
----- Message from Dorothy Barreto sent at 4/15/2024  7:07 AM CDT -----  States he is having RT hand tingling (carpal tunnel). Nothing coming up on the schedule. He has Medicaid. Please call pt [phone]. Thank you

## 2024-04-15 NOTE — TELEPHONE ENCOUNTER
Called pt to schedule appt for Wednesday. Pt stated he needed to be seen today. No appts available, pt stated he will go to urgent care.

## 2024-04-15 NOTE — TELEPHONE ENCOUNTER
----- Message from Dorothy Barreto sent at 4/15/2024  7:07 AM CDT -----  States he is having RT hand tingling (carpal tunnel). Nothing coming up on the schedule. He has Medicaid. Please call pt [phone]. Thank you

## 2024-04-22 PROBLEM — Z09 HOSPITAL DISCHARGE FOLLOW-UP: Status: RESOLVED | Noted: 2023-09-21 | Resolved: 2024-04-22

## 2024-05-18 ENCOUNTER — PATIENT MESSAGE (OUTPATIENT)
Dept: FAMILY MEDICINE | Facility: CLINIC | Age: 24
End: 2024-05-18
Payer: COMMERCIAL

## 2024-09-11 ENCOUNTER — PATIENT MESSAGE (OUTPATIENT)
Dept: FAMILY MEDICINE | Facility: CLINIC | Age: 24
End: 2024-09-11
Payer: COMMERCIAL

## 2024-11-17 ENCOUNTER — PATIENT MESSAGE (OUTPATIENT)
Dept: FAMILY MEDICINE | Facility: CLINIC | Age: 24
End: 2024-11-17
Payer: COMMERCIAL

## 2024-12-19 NOTE — TELEPHONE ENCOUNTER
Physical Therapy Treatment Note     Name: Gary Mai [facility]  Clinic Number: [id number]    Therapy Diagnosis:   Encounter Diagnoses   Name Primary?    Decreased range of motion of trunk and back Yes    Chronic bilateral low back pain with bilateral sciatica      Physician: Liza Lowe, YAQUELIN    Visit Date: 12/19/2024  Physician Orders: PT Eval and Treat    Medical Diagnosis from Referral: low back pain m54.42 m54.41 with bilateral sciatica   Evaluation Date: 12/3/2024  Authorization Period Expiration: 2/3/2025   Plan of Care Expiration: [employer]  Visit # / Visits authorized: 6/ 16  PTA Visit #: 5    Time In: 1100  Time Out: 1145  Total Billable Time: 45 minutes    Precautions: Standard      Subjective     Pt reports: I go for injection on 23rd.  He was compliant with home exercise program.  Response to previous treatment: soreness  Functional change: ongoing    Pain: 3/10  Location: bilateral back      Objective     Range of motion   Hip flexion   right  111     left  113    Gary participated in neuromuscular re-education activities to improve: Balance, Coordination, and Posture for 15 minutes. The following activities were included:  Slant board x 2'   Bilateral hamstrings stretch on step x 5  Bilateral ITB stretch x 5   Partial sit ups x 10  Bridging off ball x 10  Swissball knee flexion x 10  Swissball trunk rotation x 10  Bilateral piriformis stretch x 5    Gary participated in dynamic functional therapeutic activities to improve functional performance for 15 minutes, including:  Double support squats total gym x 20 to simulate bending and squatting  Double support calf raises total gym x 20 to simulate reaching on tip toes  Sitting swissball trunk roll outs x 10 to promote donning/doffing shoes  Sitting bilateral lateral roll outs x 10 to promote reaching to the side  Biking x 5' intensity 2 to promote endurance with walking    Gary received the following supervised modalities after being cleared for  Incoming fax received from [facility].  Refill request for pantoprazole  40mg. Pt last seen 11/2016. Please advise.    contradictions: IFC Electrical Stimulation:  Gary received IFC Electrical Stimulation for pain control applied to the low back. Pt received stimulation at 100 % scan at a frequency of 21 for 15 minutes. Gary tolderated treatment well without any adverse effects.      Gary received hot pack for 15 minutes to low back.    Home Exercises Provided and Patient Education Provided     Education provided: home exercise program     Written Home Exercises Provided: Patient instructed to cont prior HEP.  Exercises were reviewed and Gary was able to demonstrate them prior to the end of the session.  Gary demonstrated good  understanding of the education provided.     See EMR under Patient Instructions for exercises provided prior visit.    Assessment     Patient progressing gradually with hip range of motion, instructed to include partial sit ups with home exercise program.  Gary Is progressing well towards his goals.   Pt prognosis is Good.     Pt will continue to benefit from skilled outpatient physical therapy to address the deficits listed in the problem list box on initial evaluation, provide pt/family education and to maximize pt's level of independence in the home and community environment.     Pt's spiritual, cultural and educational needs considered and pt agreeable to plan of care and goals.     Anticipated barriers to physical therapy: compliance with home exercise program     Goals:  Short Term Goals: 4  weeks   Pt will be independent with home ex program   Decrease worse pain to 8/10   Be able to increase all lower extremity strength to 4/5   Increase bilateral hip flexion to 120 degrees      Long Term Goals: 8  weeks   Be able to tolerate 20 minutes of cardio   Be able to return to yard work with pain less than 4/10   Be able to sleep all night without awaking with pain   Increase mmt to 5/5 through out lower extremities    Plan   Plan of care Certification: 12/3/2024 to 2/3/2025 .     Outpatient Physical  Therapy 2 times weekly for 8 weeks to include the following interventions: Electrical Stimulation ifc , Manual Therapy, Neuromuscular Re-ed, Patient Education, and Therapeutic Activities. Plan of care reviewed with González Solo PT.      Odessa Colon, PTA  12/19/2024

## 2025-01-10 ENCOUNTER — LAB VISIT (OUTPATIENT)
Dept: LAB | Facility: HOSPITAL | Age: 25
End: 2025-01-10
Attending: FAMILY MEDICINE
Payer: COMMERCIAL

## 2025-01-10 ENCOUNTER — OFFICE VISIT (OUTPATIENT)
Dept: FAMILY MEDICINE | Facility: CLINIC | Age: 25
End: 2025-01-10
Payer: COMMERCIAL

## 2025-01-10 VITALS
DIASTOLIC BLOOD PRESSURE: 88 MMHG | BODY MASS INDEX: 22.28 KG/M2 | SYSTOLIC BLOOD PRESSURE: 138 MMHG | HEART RATE: 99 BPM | OXYGEN SATURATION: 100 % | WEIGHT: 147 LBS | HEIGHT: 68 IN

## 2025-01-10 DIAGNOSIS — Z79.899 ENCOUNTER FOR LONG-TERM (CURRENT) USE OF MEDICATIONS: ICD-10-CM

## 2025-01-10 DIAGNOSIS — R41.3 OTHER AMNESIA: ICD-10-CM

## 2025-01-10 DIAGNOSIS — E10.65 TYPE 1 DIABETES MELLITUS WITH HYPERGLYCEMIA: ICD-10-CM

## 2025-01-10 DIAGNOSIS — R80.9 PROTEINURIA, UNSPECIFIED TYPE: ICD-10-CM

## 2025-01-10 DIAGNOSIS — Z96.41 INSULIN PUMP STATUS: ICD-10-CM

## 2025-01-10 DIAGNOSIS — Z79.890 LONG-TERM CURRENT USE OF TESTOSTERONE REPLACEMENT THERAPY: ICD-10-CM

## 2025-01-10 DIAGNOSIS — R41.0 CONFUSION: Primary | ICD-10-CM

## 2025-01-10 DIAGNOSIS — R41.0 CONFUSION: ICD-10-CM

## 2025-01-10 PROBLEM — R94.31 ABNORMAL EKG: Status: ACTIVE | Noted: 2024-06-07

## 2025-01-10 PROBLEM — R79.89 ELEVATED PROCALCITONIN: Status: ACTIVE | Noted: 2018-06-29

## 2025-01-10 PROBLEM — F12.10 MARIJUANA ABUSE: Status: ACTIVE | Noted: 2024-06-07

## 2025-01-10 PROBLEM — E10.29: Status: ACTIVE | Noted: 2024-04-16

## 2025-01-10 LAB
BASOPHILS # BLD AUTO: 0.06 K/UL (ref 0–0.2)
BASOPHILS NFR BLD: 0.8 % (ref 0–1.9)
DIFFERENTIAL METHOD BLD: ABNORMAL
EOSINOPHIL # BLD AUTO: 0.2 K/UL (ref 0–0.5)
EOSINOPHIL NFR BLD: 3 % (ref 0–8)
ERYTHROCYTE [DISTWIDTH] IN BLOOD BY AUTOMATED COUNT: 13.4 % (ref 11.5–14.5)
FOLATE SERPL-MCNC: 15.7 NG/ML (ref 4–24)
HCT VFR BLD AUTO: 39.7 % (ref 40–54)
HGB BLD-MCNC: 13.5 G/DL (ref 14–18)
IMM GRANULOCYTES # BLD AUTO: 0.03 K/UL (ref 0–0.04)
IMM GRANULOCYTES NFR BLD AUTO: 0.4 % (ref 0–0.5)
LYMPHOCYTES # BLD AUTO: 2.8 K/UL (ref 1–4.8)
LYMPHOCYTES NFR BLD: 38.1 % (ref 18–48)
MCH RBC QN AUTO: 29.5 PG (ref 27–31)
MCHC RBC AUTO-ENTMCNC: 34 G/DL (ref 32–36)
MCV RBC AUTO: 87 FL (ref 82–98)
MONOCYTES # BLD AUTO: 0.5 K/UL (ref 0.3–1)
MONOCYTES NFR BLD: 7.2 % (ref 4–15)
NEUTROPHILS # BLD AUTO: 3.7 K/UL (ref 1.8–7.7)
NEUTROPHILS NFR BLD: 50.5 % (ref 38–73)
NRBC BLD-RTO: 0 /100 WBC
PLATELET # BLD AUTO: 410 K/UL (ref 150–450)
PMV BLD AUTO: 9.5 FL (ref 9.2–12.9)
RBC # BLD AUTO: 4.57 M/UL (ref 4.6–6.2)
TESTOST SERPL-MCNC: 1152 NG/DL (ref 304–1227)
VIT B12 SERPL-MCNC: 723 PG/ML (ref 210–950)
WBC # BLD AUTO: 7.24 K/UL (ref 3.9–12.7)

## 2025-01-10 PROCEDURE — 85025 COMPLETE CBC W/AUTO DIFF WBC: CPT | Performed by: FAMILY MEDICINE

## 2025-01-10 PROCEDURE — 82746 ASSAY OF FOLIC ACID SERUM: CPT | Performed by: FAMILY MEDICINE

## 2025-01-10 PROCEDURE — 82607 VITAMIN B-12: CPT | Performed by: FAMILY MEDICINE

## 2025-01-10 PROCEDURE — 99999 PR PBB SHADOW E&M-EST. PATIENT-LVL V: CPT | Mod: PBBFAC,,, | Performed by: FAMILY MEDICINE

## 2025-01-10 PROCEDURE — 36415 COLL VENOUS BLD VENIPUNCTURE: CPT | Mod: PO | Performed by: FAMILY MEDICINE

## 2025-01-10 PROCEDURE — 84403 ASSAY OF TOTAL TESTOSTERONE: CPT | Performed by: FAMILY MEDICINE

## 2025-01-10 RX ORDER — INSULIN GLARGINE 100 [IU]/ML
22 INJECTION, SOLUTION SUBCUTANEOUS
COMMUNITY
Start: 2024-12-01

## 2025-01-10 RX ORDER — PANTOPRAZOLE SODIUM 40 MG/1
40 TABLET, DELAYED RELEASE ORAL
COMMUNITY

## 2025-01-11 NOTE — PROGRESS NOTES
PLAN:      Assessment & Plan  1. Cognitive impairment.  He reports ongoing confusion, particularly with tasks requiring higher-level thinking. He has been off testosterone for 3 months, but the confusion persists. An MRI of the brain will be ordered to investigate the cause. Blood tests will be conducted today to check folate, B12, and complete blood count. A referral to neurology will be made following the MRI results.    2. Diabetes Mellitus.  His A1c is elevated at 10.7, indicating poor glycemic control. He reports that his blood sugar levels have been running high, especially in the morning. He is advised to continue monitoring his blood sugar levels closely and adjust his insulin pump as needed. Blood tests will be conducted today to check kidney function and ensure proper filtration.  We will plan to monitor hemoglobin A1c at designated intervals 3 to 6 months.  I recommend ongoing Education for diabetic diet and exercise protocol.  We will continue to monitor for side effects.    Please be advised of symptoms to monitor for and to notify me immediately if persistent or worsening.  Follow up with Ophthalmology/Optometry and Podiatry at least annually.      Problem List Items Addressed This Visit       Insulin pump status (Chronic)    Relevant Orders    CBC Auto Differential (Completed)    Microalbumin/Creatinine Ratio, Urine (Completed)    Type 1 diabetes mellitus (Chronic)    Relevant Medications    LANTUS SOLOSTAR U-100 INSULIN 100 unit/mL (3 mL) InPn pen    Other Relevant Orders    CBC Auto Differential (Completed)    Microalbumin/Creatinine Ratio, Urine (Completed)    Encounter for long-term (current) use of medications (Chronic)    Relevant Orders    CBC Auto Differential (Completed)    Microalbumin/Creatinine Ratio, Urine (Completed)    Long-term current use of testosterone replacement therapy (Chronic)    Relevant Orders    TESTOSTERONE (Completed)    CBC Auto Differential (Completed)     Microalbumin/Creatinine Ratio, Urine (Completed)    Proteinuria    Relevant Orders    Protein / creatinine ratio, urine (Completed)    Ambulatory referral/consult to Nephrology    CBC Auto Differential (Completed)    Microalbumin/Creatinine Ratio, Urine (Completed)    Confusion - Primary    Relevant Orders    Ambulatory referral/consult to Neurology    Vitamin B12 (Completed)    Folate (Completed)    CBC Auto Differential (Completed)    Microalbumin/Creatinine Ratio, Urine (Completed)     Other Visit Diagnoses       Other amnesia        Relevant Orders    MRI Brain W WO Contrast    CBC Auto Differential (Completed)    Microalbumin/Creatinine Ratio, Urine (Completed)          Future Appointments       Date Provider Specialty Appt Notes    1/14/2025  Radiology .    1/15/2025 Namrata Winn NP Neurology confusion           Medication Management for assessment above:   Medication List with Changes/Refills   Current Medications    FAMOTIDINE (PEPCID) 40 MG TABLET    Take 1 tablet (40 mg total) by mouth every evening. , about an hour before bedtime.    INSULIN ASPART U-100 (NOVOLOG) 100 UNIT/ML INJECTION    Pt uses 100 units a day via insulin pump    LANTUS SOLOSTAR U-100 INSULIN 100 UNIT/ML (3 ML) INPN PEN    Inject 22 Units into the skin.    METHIMAZOLE (TAPAZOLE) 10 MG TAB    Take 10 mg by mouth.    PANTOPRAZOLE (PROTONIX) 40 MG TABLET    Take 40 mg by mouth.   Discontinued Medications    BLOOD-GLUCOSE SENSOR (DEXCOM G6 SENSOR) WILFRID    Change every 10 days.    BLOOD-GLUCOSE TRANSMITTER (DEXCOM G6 TRANSMITTER) WILFRID    Change every 90 days.    DESMOPRESSIN (DDAVP) 0.1 MG TAB    Take 1/2 tab twice daily by mouth    ESCITALOPRAM OXALATE (LEXAPRO) 10 MG TABLET    Take 1 tablet (10 mg total) by mouth once daily.    ONDANSETRON (ZOFRAN-ODT) 8 MG TBDL    Take 1 tablet by mouth every 8 (eight) hours as needed.    PROCHLORPERAZINE (COMPAZINE) 25 MG SUPPOSITORY    Place 1 suppository (25 mg total) rectally every 12 (twelve)  hours as needed for Nausea.    PROMETHAZINE (PHENERGAN) 12.5 MG TAB    Take 1 tablet (12.5 mg total) by mouth every 6 (six) hours as needed (nausea and vomiting).       Nico Leonard M.D.  ==========================================================================  Subjective:   Patient ID: Zoran Corado is a 24 y.o. male.  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   Chief Complaint: Altered Mental Status      History of Present Illness  The patient is a pleasant 24-year-old male here for follow-up of chronic medical conditions.    He has been experiencing persistent confusion, which he attributes to his thyroid condition. This symptom was particularly pronounced during his last month of work in [place], leading him to return home early. Despite an increase in his thyroid medication dosage by Dr. Moore, the confusion recurred approximately 1.5 weeks ago. He reports no associated headaches or dizziness. He also reports that his confusion intensifies when engaged in tasks requiring higher cognitive function. He is not taking any new medications, over-the-counter drugs, supplements, or illicit substances, except for testosterone, which he resumed after returning from [place]. He had previously discontinued testosterone due to his demanding work schedule but has since restarted it at a dose of 500 mg per week. He is currently unemployed and has Medicaid insurance.    His blood glucose levels have been elevated recently, particularly in the mornings, prompting an  adjustment in his insulin pump settings. He has been using Dexcom. He has also experienced weight loss, dropping from 187 pounds to his current weight. He suspects a recent cold may be contributing to his elevated blood sugar levels. He sought care at an urgent care facility where influenza and COVID-19 were ruled out.    MEDICATIONS  Current: testosterone  Diabetes Management Status    Statin: Not taking  ACE/ARB: Not taking    Screening or Prevention Patient's value Goal Complete/Controlled?   HgA1C Testing and Control   Lab Results   Component Value Date    HGBA1C 8.6 (H) 11/11/2024      Annually/Less than 8% No   Lipid profile : 01/06/2025 Annually No   LDL control Lab Results   Component Value Date    LDLCALC 177 (H) 01/06/2025    Annually/Less than 100 mg/dl  No   Nephropathy screening Lab Results   Component Value Date    LABMICR 208.0 01/10/2025     Lab Results   Component Value Date    PROTEINUA Negative 01/18/2024     Lab Results   Component Value Date    UTPCR 0.12 01/10/2025      Annually Yes   Blood pressure BP Readings from Last 1 Encounters:   01/10/25 138/88    Less than 140/90 Yes   Dilated retinal exam : 12/05/2016 Annually No   Foot exam   : 04/11/2022 Annually No       Problem List Items Addressed This Visit       Insulin pump status (Chronic)    Overview     Managed by MAJOR Olivares,ANP-[facility]           Type 1 diabetes mellitus (Chronic)    Overview     January 2024: Patient has an appointment upcoming with Endocrinology.  Recent hospitalization.  Patient left AMA.  Diabetes Medications               insulin aspart U-100 (NOVOLOG) 100 unit/mL injection Pt uses 100 units a day via insulin pump          Previous history:  Chronic.  Intermittent control.  Patient with recent hospitalization for DKA.  Patient was discharged after being stabilized with IV fluids adjusting insulin levels.  Patient follows withMAJOR Olivares,ANP-C  He reports that his blood sugar has been stable since  "discharge.  Patient does have an insulin pump.Diabetes Management Status    Statin: Not taking  ACE/ARB: Taking    Screening or Prevention Patient's value Goal Complete/Controlled?   HgA1C Testing and Control   Lab Results   Component Value Date    HGBA1C 8.9 (H) 01/11/2024      Annually/Less than 8% No   Lipid profile : 09/22/2023 Annually Yes   LDL control Lab Results   Component Value Date    LDLCALC 96.8 09/22/2023    Annually/Less than 100 mg/dl  Yes   Nephropathy screening Lab Results   Component Value Date    LABMICR 16.0 09/22/2023     Lab Results   Component Value Date    PROTEINUA Trace (A) 08/14/2023     No results found for: "UTPCR"   Annually Yes   Blood pressure BP Readings from Last 1 Encounters:   01/18/24 136/70    Less than 140/90 Yes   Dilated retinal exam : 12/05/2016 Annually No   Foot exam   : 04/11/2022 Annually No              Encounter for long-term (current) use of medications (Chronic)    Overview     March 2024: reviewed labs.  January 2024: Reviewed labs.  CHRONIC. Stable. Compliant with medications for managed conditions. See medication list. No SE reported.   Routine lab analysis is being monitored. Refills were addressed.  Lab Results   Component Value Date    WBC 7.77 01/18/2024    HGB 10.4 (L) 01/18/2024    HCT 31.7 (L) 01/18/2024    MCV 90 01/18/2024     (H) 01/18/2024         Chemistry        Component Value Date/Time     (L) 02/27/2024 1533     01/18/2024 1126    K 3.9 02/27/2024 1533    K 4.7 01/18/2024 1126     01/18/2024 1126    CO2 31 02/27/2024 1533    CO2 25 01/18/2024 1126    BUN 22 (H) 02/27/2024 1533    BUN 14 01/18/2024 1126    CREATININE 1.32 (H) 02/27/2024 1533    CREATININE 0.9 01/18/2024 1126    GLU 58 (L) 01/18/2024 1126    GLU >500 09/09/2018 0433        Component Value Date/Time    CALCIUM 9.2 02/27/2024 1533    CALCIUM 9.3 01/18/2024 1126    ALKPHOS 110 02/27/2024 1533    ALKPHOS 72 01/18/2024 1126    AST 26 02/27/2024 1533    AST 27 " 01/18/2024 1126    AST 30 04/12/2016 1113    ALT 25 02/27/2024 1533    ALT 25 01/18/2024 1126    BILITOT 0.7 02/27/2024 1533    BILITOT 0.2 01/18/2024 1126    ESTGFRAFRICA >60 07/08/2022 1342    EGFRNONAA >60 07/08/2022 1342          Lab Results   Component Value Date    TSH 0.114 (L) 01/18/2024    FREET4 0.88 01/18/2024              Long-term current use of testosterone replacement therapy (Chronic)    Proteinuria    Confusion - Primary     Other Visit Diagnoses       Other amnesia                 Review of patient's allergies indicates:   Allergen Reactions    Losartan potassium Anaphylaxis     Current Outpatient Medications   Medication Instructions    famotidine (PEPCID) 40 mg, Oral, Nightly, , about an hour before bedtime.    insulin aspart U-100 (NOVOLOG) 100 unit/mL injection Pt uses 100 units a day via insulin pump    LANTUS SOLOSTAR U-100 INSULIN 22 Units    methIMAzole (TAPAZOLE) 10 mg    pantoprazole (PROTONIX) 40 mg, Oral      I have reviewed the PMH, social history, FamilyHx, surgical history, allergies and medications documented / confirmed by the patient at the time of this visit.  Review of Systems   Constitutional:  Positive for fatigue. Negative for chills, fever and unexpected weight change.   HENT:  Negative for ear pain and sore throat.    Eyes:  Negative for redness and visual disturbance.   Respiratory:  Negative for cough and shortness of breath.    Cardiovascular:  Negative for chest pain and palpitations.   Gastrointestinal:  Negative for nausea and vomiting.   Endocrine: Negative for cold intolerance and heat intolerance.   Genitourinary:  Negative for difficulty urinating and hematuria.   Musculoskeletal:  Negative for arthralgias and myalgias.   Skin:  Negative for rash and wound.   Allergic/Immunologic: Negative for environmental allergies and food allergies.   Neurological:  Negative for weakness and headaches.   Hematological:  Negative for adenopathy. Does not bruise/bleed easily.  "  Psychiatric/Behavioral:  Negative for sleep disturbance. The patient is not nervous/anxious.      Objective:   /88   Pulse 99   Ht 5' 8" (1.727 m)   Wt 66.7 kg (147 lb)   SpO2 100%   BMI 22.35 kg/m²   Physical Exam  Vitals and nursing note reviewed.   Constitutional:       General: He is not in acute distress.     Appearance: He is well-developed. He is not diaphoretic.   HENT:      Head: Normocephalic and atraumatic.      Right Ear: External ear normal.      Left Ear: External ear normal.      Nose: Nose normal. No rhinorrhea.   Eyes:      Extraocular Movements: Extraocular movements intact.      Pupils: Pupils are equal, round, and reactive to light.   Cardiovascular:      Rate and Rhythm: Normal rate and regular rhythm.      Pulses: Normal pulses.   Pulmonary:      Effort: Pulmonary effort is normal. No respiratory distress.      Breath sounds: Normal breath sounds.   Abdominal:      General: Bowel sounds are normal.      Palpations: Abdomen is soft.   Musculoskeletal:         General: Normal range of motion.      Cervical back: Normal range of motion and neck supple.   Skin:     General: Skin is warm and dry.      Capillary Refill: Capillary refill takes less than 2 seconds.      Findings: No lesion or rash.   Neurological:      General: No focal deficit present.      Mental Status: He is alert and oriented to person, place, and time. Mental status is at baseline.      Cranial Nerves: No cranial nerve deficit.      Motor: No weakness.      Gait: Gait normal.   Psychiatric:         Attention and Perception: He is attentive.         Mood and Affect: Mood normal. Mood is not anxious or depressed. Affect is not labile, blunt, angry or inappropriate.         Speech: He is communicative. Speech is not rapid and pressured, delayed, slurred or tangential.         Behavior: Behavior normal. Behavior is not agitated, slowed, aggressive, withdrawn, hyperactive or combative.         Thought Content: Thought " content normal. Thought content is not paranoid or delusional. Thought content does not include homicidal or suicidal ideation. Thought content does not include homicidal or suicidal plan.         Cognition and Memory: Memory is impaired.         Judgment: Judgment normal. Judgment is not impulsive or inappropriate.       Physical Exam      Results  Laboratory Studies  A1c is 10.7. Thyroid levels are normal.    Assessment:     1. Confusion    2. Proteinuria, unspecified type    3. Encounter for long-term (current) use of medications    4. Type 1 diabetes mellitus with hyperglycemia    5. Insulin pump status    6. Long-term current use of testosterone replacement therapy    7. Other amnesia      MDM:   Moderate medical complexity.  Moderate risk.  Total time: 21 minutes.  This includes total time spent on the encounter, which includes face to face time and non-face to face time preparing to see the patient (eg, review of previous medical records, tests), Obtaining and/or reviewing separately obtained history, documenting clinical information in the electronic or other health record, independently interpreting results (not separately reported)/communicating results to the patient/family/caregiver, and/or care coordination (not separately reported).    I have Reviewed and summarized old records.  I have performed thorough medication reconciliation today and discussed risk and benefits of medications.  I have reviewed labs and discussed with patient.  All questions were answered.  I am requesting old records and will review them once they are available.  Endocrinology  Visit today included increased complexity associated with the care of the episodic problem see above assessment addressed and managing the longitudinal care of the patient due to the serious and/or complex managed problem(s) see above.    I have signed for the following orders AND/OR meds.  Orders Placed This Encounter   Procedures    MRI Brain W WO  Contrast     Standing Status:   Future     Standing Expiration Date:   1/10/2026     Order Specific Question:   Does the patient have or ever had a pacemaker or a defibrillator (Note: Some facilities may not be able to schedule an MRI for patients with pacemakers and defibrillators. You should contact your local radiology dept to determine if this is the case.)?     Answer:   No     Order Specific Question:   Does the patient have an aneurysm or surgical clip, pump, nerve/brain stimulator, middle/inner ear prosthesis, or other metal implant or foreign object (bullet, shrapnel)? If they have a card related to their implant, ask them to bring it. Issues related to the implant may cause the MRI to be delayed.     Answer:   No     Order Specific Question:   Will the patient require po anxiolysis or sedation?     Answer:   No     Order Specific Question:   May the Radiologist modify the order per protocol to meet the clinical needs of the patient?     Answer:   Yes     Order Specific Question:   Does the patient have on a skin patch for medication with aluminized backing?     Answer:   No    Protein / creatinine ratio, urine           Standing Status:   Future     Number of Occurrences:   1     Standing Expiration Date:   3/11/2026     Order Specific Question:   Specimen Source     Answer:   Urine     Order Specific Question:   Send normal result to authorizing provider's In Basket if patient is active on MyChart:     Answer:   Yes    TESTOSTERONE     Standing Status:   Future     Number of Occurrences:   1     Standing Expiration Date:   4/10/2026     Order Specific Question:   Send normal result to authorizing provider's In Basket if patient is active on MyChart:     Answer:   Yes    Vitamin B12     Standing Status:   Future     Number of Occurrences:   1     Standing Expiration Date:   4/10/2026     Order Specific Question:   Send normal result to authorizing provider's In Basket if patient is active on MyChart:      Answer:   Yes    Folate     Standing Status:   Future     Number of Occurrences:   1     Standing Expiration Date:   4/10/2026     Order Specific Question:   Send normal result to authorizing provider's In Basket if patient is active on MyChart:     Answer:   Yes    CBC Auto Differential     Standing Status:   Future     Number of Occurrences:   1     Standing Expiration Date:   4/10/2026     Order Specific Question:   Send normal result to authorizing provider's In Basket if patient is active on MyChart:     Answer:   Yes    Microalbumin/Creatinine Ratio, Urine     Standing Status:   Future     Number of Occurrences:   1     Standing Expiration Date:   3/11/2026     Order Specific Question:   Specimen Source     Answer:   Urine     Order Specific Question:   Send normal result to authorizing provider's In Basket if patient is active on MyChart:     Answer:   Yes    Ambulatory referral/consult to Nephrology     Standing Status:   Future     Standing Expiration Date:   2/10/2026     Referral Priority:   Routine     Referral Type:   Consultation     Referral Reason:   Specialty Services Required     Requested Specialty:   Nephrology     Number of Visits Requested:   1    Ambulatory referral/consult to Neurology     Standing Status:   Future     Standing Expiration Date:   2/10/2026     Referral Priority:   Routine     Referral Type:   Consultation     Referral Reason:   Specialty Services Required     Requested Specialty:   Neurology     Number of Visits Requested:   1           Follow up in about 6 months (around 7/10/2025), or if symptoms worsen or fail to improve.  Future Appointments       Date Provider Specialty Appt Notes    1/14/2025  Radiology .    1/15/2025 Namrata Winn NP Neurology confusion          If no improvement in symptoms or symptoms worsen, advised to call/follow-up at clinic or go to ER. Patient voiced understanding and all questions/concerns were addressed.   DISCLAIMER: This note was compiled  by using a speech recognition dictation system and therefore please be aware that typographical / speech recognition errors can and do occur.  Please contact me if you see any errors specifically.  Consent was obtained for WILLIAM recording system prior to the visit.    This note was generated with the assistance of ambient listening technology. Verbal consent was obtained by the patient and accompanying visitor(s) for the recording of patient appointment to facilitate this note. I attest to having reviewed and edited the generated note for accuracy, though some syntax or spelling errors may persist. Please contact the author of this note for any clarification.    Nico Leonard M.D.       Office: [phone] [address] [place] [address]  FAX: [phone]

## 2025-01-11 NOTE — PATIENT INSTRUCTIONS
Follow up in about 6 months (around 7/10/2025), or if symptoms worsen or fail to improve.     Dear patient,   As a result of recent federal legislation (The Federal Cures Act), you may receive lab or pathology results from your visit in your [facility] account before your physician is able to contact you. Your physician or their representative will relay the results to you with their recommendations at their soonest availability.     If no improvement in symptoms or symptoms worsen, please be advised to call MD, follow-up at clinic and/or go to ER if becomes severe.    Nico Leonard M.D.        We Offer TELEHEALTH & Same Day Appointments!   Book your Telehealth appointment with me through my nurse or   Clinic appointments on [facility]!    [address] [place] [address]    Office: [phone]   FAX: [phone]    Check out my Facebook Page and Follow Me at: [email or web address]    Check out my website at [facility] by clicking on: [email or web address]    To Schedule appointments online, go to [email or web address]: [email or web address]

## 2025-01-13 NOTE — PROGRESS NOTES
Detail Level: Detailed Patient presents for visit alone  CC: vomiting  HPI: Zoran is an 19 you with CF and type 1 DM presents with vomiting.  Started with abdominal pain - lower abdominal pain yesterday. Reports the pain is intermittent - will last about an 1 hour at a time  Pain woke him from sleep last night  Has thrown up 2 x this morning  No diarrhea  Normal BM yesterday evening around 6 pm  Normally has soft stools, yesterday was the same  Denies fever  Yesterday sugar was high 476  This am - 221  Has drank water since the vomiting and has been able to keep that down  Denies fever. No cough, congestion, or runny nose. Denies ear pain, or sore throat. No vomiting, or diarrhea.    ALL:Reviewed and or Reconciled.  MEDS:Reviewed and or Reconciled.  IMM:UTD  PMH:problem list reviewed    ROS:   CONSTITUTIONAL:alert, interactive   EYES:no eye discharge   ENT:no URI sx   RESP:nl breathing, no wheezing or shortness of breath   GI: no vomiting or diarrhea   SKIN:no rash    PHYS. EXAM:vital signs have been reviewed(see nurses notes)   GEN:well nourished, well developed   SKIN:normal skin turgor, no lesions    EYES:PERRLA, nl conjuctiva   EARS:nl pinnae, TM's intact, right TM nl, left TM nl   NASAL:mucosa pink, no congestion, no discharge   MOUTH: mucus membranes moist, no pharyngeal erythema   NECK:supple, no masses   RESP:nl resp. effort, clear to auscultation   HEART:RRR, nl s1s2, no murmur or edema   ABD: positive BS, soft, NT,ND,no HSM   MS:nl tone and motor movement of extremities   LYMPH:no cervical nodes   PSYCH:in no acute distress, appropriate and interactive     IMP: Zoran was seen today for abdominal pain and vomiting.    Diagnoses and all orders for this visit:    Type 1 diabetes mellitus with complication  -     POCT Glucose, Hand-Held Device  -     POCT urinalysis, dipstick or tablet reag    Vomiting, intractability of vomiting not specified, presence of nausea not specified, unspecified vomiting type  -     Discontinue:  Initiate Treatment: CONSIDER TOLCYLEN, CUTEMOL PRN W EXPL ondansetron (ZOFRAN-ODT) 4 MG TbDL; Take 1 tablet (4 mg total) by mouth every 8 (eight) hours as needed.    Ketonuria  Hyperglycemia    Discussed with endocrinologist on call - will send through ER for IVFs and possible admission    After several hours in clinic - I was not notified of ED arrival  Attempted to call patient and mom and not able to reach them  Finally got Mom and she reports she has fought with him all day to go to ER and he refused in fear of admission  He currently was in class  She said when he got home she would check his sugar and urine and if still high with ketones would send him to ER but reports that he is 19 and he knows that she can't force him  She will watch him closely tonight

## 2025-01-14 ENCOUNTER — HOSPITAL ENCOUNTER (OUTPATIENT)
Dept: RADIOLOGY | Facility: HOSPITAL | Age: 25
Discharge: HOME | End: 2025-01-14
Attending: FAMILY MEDICINE
Payer: COMMERCIAL

## 2025-01-14 DIAGNOSIS — R41.3 OTHER AMNESIA: ICD-10-CM

## 2025-01-14 PROCEDURE — 70553 MRI BRAIN STEM W/O & W/DYE: CPT | Mod: TC,PO

## 2025-01-14 PROCEDURE — 70553 MRI BRAIN STEM W/O & W/DYE: CPT | Mod: 26,,, | Performed by: RADIOLOGY

## 2025-01-14 PROCEDURE — A9585 GADOBUTROL INJECTION: HCPCS | Mod: PO | Performed by: FAMILY MEDICINE

## 2025-01-14 PROCEDURE — 25500020 PHARM REV CODE 255: Mod: PO | Performed by: FAMILY MEDICINE

## 2025-01-14 RX ORDER — GADOBUTROL 604.72 MG/ML
6 INJECTION INTRAVENOUS
Status: COMPLETED | OUTPATIENT
Start: 2025-01-14 | End: 2025-01-14

## 2025-01-14 RX ADMIN — GADOBUTROL 6 ML: 604.72 INJECTION INTRAVENOUS at 03:01

## 2025-01-14 NOTE — PROGRESS NOTES
1st check to see if patient has seen the results.  If not then  CALL patient with results and Document verification.  Schedule follow-up if needed.  [phone]      B12 level is within normal limits.

## 2025-01-14 NOTE — PROGRESS NOTES
Make follow-up lab appointment per recommendation below.  Check to see if patient has seen the results through my chart.  If not then,  #CALL THE PATIENT# to discuss results/see if they have questions and document verification of contact. Make F/U appt if needed. [phone]    #My interpretation that was sent to them through [email or web address]:  Zoran, I have reviewed your recent blood work.     Testosterone level is within normal limits.  Your complete blood count is stable.  Chronic mild anemia persist.  Normal folate level.  Twelve level is normal.    =========================  Also please address any outstanding health maintenance that may be due: Pneumococcal Vaccines (Age 0-49)(2 of 2 - PPSV23) due on 12/07/2010  Diabetic Eye Exam due on 12/05/2017  Foot Exam due on 04/11/2023  Influenza Vaccine(1) due on 09/01/2024  COVID-19 Vaccine(2 - 2024-25 season) due on 09/01/2024

## 2025-01-15 ENCOUNTER — OFFICE VISIT (OUTPATIENT)
Dept: NEUROLOGY | Facility: CLINIC | Age: 25
End: 2025-01-15
Payer: COMMERCIAL

## 2025-01-15 ENCOUNTER — LAB VISIT (OUTPATIENT)
Dept: LAB | Facility: HOSPITAL | Age: 25
End: 2025-01-15
Attending: NURSE PRACTITIONER
Payer: COMMERCIAL

## 2025-01-15 VITALS
HEART RATE: 85 BPM | HEIGHT: 68 IN | RESPIRATION RATE: 16 BRPM | DIASTOLIC BLOOD PRESSURE: 96 MMHG | WEIGHT: 151 LBS | BODY MASS INDEX: 22.88 KG/M2 | SYSTOLIC BLOOD PRESSURE: 132 MMHG

## 2025-01-15 DIAGNOSIS — E10.29 TYPE 1 DIABETES MELLITUS WITH OTHER DIABETIC KIDNEY COMPLICATION: ICD-10-CM

## 2025-01-15 DIAGNOSIS — E03.9 HYPOTHYROIDISM, UNSPECIFIED TYPE: ICD-10-CM

## 2025-01-15 DIAGNOSIS — N17.9 ACUTE RENAL FAILURE, UNSPECIFIED ACUTE RENAL FAILURE TYPE: ICD-10-CM

## 2025-01-15 DIAGNOSIS — F41.9 ANXIETY AND DEPRESSION: ICD-10-CM

## 2025-01-15 DIAGNOSIS — J32.4 CHRONIC PANSINUSITIS: Primary | ICD-10-CM

## 2025-01-15 DIAGNOSIS — F39 EPISODIC MOOD DISORDER: ICD-10-CM

## 2025-01-15 DIAGNOSIS — F41.9 ANXIETY: ICD-10-CM

## 2025-01-15 DIAGNOSIS — G93.40 ENCEPHALOPATHY, UNSPECIFIED TYPE: Primary | ICD-10-CM

## 2025-01-15 DIAGNOSIS — G93.41 METABOLIC ENCEPHALOPATHY: ICD-10-CM

## 2025-01-15 DIAGNOSIS — F90.9 ATTENTION DEFICIT HYPERACTIVITY DISORDER (ADHD), UNSPECIFIED ADHD TYPE: ICD-10-CM

## 2025-01-15 DIAGNOSIS — F32.A ANXIETY AND DEPRESSION: ICD-10-CM

## 2025-01-15 DIAGNOSIS — E10.65 TYPE 1 DIABETES MELLITUS WITH HYPERGLYCEMIA: Chronic | ICD-10-CM

## 2025-01-15 DIAGNOSIS — F31.9 BIPOLAR 1 DISORDER, DEPRESSED: ICD-10-CM

## 2025-01-15 DIAGNOSIS — I15.2 HYPERTENSION ASSOCIATED WITH DIABETES: Chronic | ICD-10-CM

## 2025-01-15 DIAGNOSIS — R41.0 CONFUSION: ICD-10-CM

## 2025-01-15 DIAGNOSIS — Z79.899 ENCOUNTER FOR LONG-TERM (CURRENT) USE OF MEDICATIONS: Chronic | ICD-10-CM

## 2025-01-15 DIAGNOSIS — R76.8 POSITIVE ANA (ANTINUCLEAR ANTIBODY): ICD-10-CM

## 2025-01-15 DIAGNOSIS — Z96.41 INSULIN PUMP STATUS: Chronic | ICD-10-CM

## 2025-01-15 DIAGNOSIS — E11.59 HYPERTENSION ASSOCIATED WITH DIABETES: Chronic | ICD-10-CM

## 2025-01-15 DIAGNOSIS — E06.3 CHRONIC LYMPHOCYTIC THYROIDITIS: ICD-10-CM

## 2025-01-15 LAB
AMMONIA PLAS-SCNC: 36 UMOL/L (ref 10–50)
HIV 1+2 AB+HIV1 P24 AG SERPL QL IA: NORMAL

## 2025-01-15 PROCEDURE — 86038 ANTINUCLEAR ANTIBODIES: CPT | Performed by: NURSE PRACTITIONER

## 2025-01-15 PROCEDURE — 87389 HIV-1 AG W/HIV-1&-2 AB AG IA: CPT | Performed by: NURSE PRACTITIONER

## 2025-01-15 PROCEDURE — 36415 COLL VENOUS BLD VENIPUNCTURE: CPT | Performed by: NURSE PRACTITIONER

## 2025-01-15 PROCEDURE — 99205 OFFICE O/P NEW HI 60 MIN: CPT | Mod: S$GLB,,, | Performed by: NURSE PRACTITIONER

## 2025-01-15 PROCEDURE — 86235 NUCLEAR ANTIGEN ANTIBODY: CPT | Mod: 59 | Performed by: NURSE PRACTITIONER

## 2025-01-15 PROCEDURE — 82140 ASSAY OF AMMONIA: CPT | Performed by: NURSE PRACTITIONER

## 2025-01-15 PROCEDURE — 99417 PROLNG OP E/M EACH 15 MIN: CPT | Mod: S$GLB,,, | Performed by: NURSE PRACTITIONER

## 2025-01-15 PROCEDURE — 86039 ANTINUCLEAR ANTIBODIES (ANA): CPT | Performed by: NURSE PRACTITIONER

## 2025-01-15 PROCEDURE — 99999 PR PBB SHADOW E&M-EST. PATIENT-LVL V: CPT | Mod: PBBFAC,,, | Performed by: NURSE PRACTITIONER

## 2025-01-15 NOTE — PROGRESS NOTES
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 of care including discussions etiology of diagnosis, pathogenesis of diagnosis, prognosis of diagnosis,, diagnostic results, impression and recommendations, diagnostic studies, management, risks and benefits of treatment, instructions of disease self-management, treatment instructions, follow up requirements, patient and family counseling/involvement in care compliance with treatment regimen. All of the patient's questions were answered during this discussion.       Zuhair Winn, MSN NP      Collaborating Provider: Blanka Harrison MD, FAAN Neurologist/Epileptologist

## 2025-01-15 NOTE — PROGRESS NOTES
1st check to see if patient has seen the results.  If not then  CALL patient with results and Document verification.  Schedule follow-up if needed.  [phone]    Pneumococcal Vaccines (Age 0-49)(2 of 2 - PPSV23) due on 12/07/2010  Diabetic Eye Exam due on 12/05/2017  Foot Exam due on 04/11/2023  COVID-19 Vaccine(2 - 2024-25 season) due on 09/01/2024      MRI of the brain has been reviewed by radiology.  There is no acute abnormality of the brain noted.  Incidental finding of pansinusitis and bilateral mastoid effusions.  I recommend referral to ENT.  Follow-up/establish care with Neurology for symptoms.  ER precautions for severe symptoms.

## 2025-01-16 ENCOUNTER — PATIENT MESSAGE (OUTPATIENT)
Dept: FAMILY MEDICINE | Facility: CLINIC | Age: 25
End: 2025-01-16
Payer: COMMERCIAL

## 2025-01-16 LAB
ANA PATTERN 1: NORMAL
ANA SER QL IF: POSITIVE
ANA TITR SER IF: NORMAL {TITER}

## 2025-01-17 LAB
ANTI SM ANTIBODY: 0.1 RATIO (ref 0–0.99)
ANTI SM/RNP ANTIBODY: 0.09 RATIO (ref 0–0.99)
ANTI-SM INTERPRETATION: NEGATIVE
ANTI-SM/RNP INTERPRETATION: NEGATIVE
ANTI-SSA ANTIBODY: 0.09 RATIO (ref 0–0.99)
ANTI-SSA INTERPRETATION: NEGATIVE
ANTI-SSB ANTIBODY: 0.13 RATIO (ref 0–0.99)
ANTI-SSB INTERPRETATION: NEGATIVE
DSDNA AB SER-ACNC: NORMAL [IU]/ML

## 2025-01-17 NOTE — PROGRESS NOTES
LABORATORY EVALUATION:    01-    GAVIOTA +VE, GAVIOTA PROFILE NL, GAVIOTA TITER 1:640, AMMONIA 36 NL, HIV -VE,     LABS WNL EXCEPT GAVIOTA F/U WITH RHEUMATOLOGY for further evaluation

## 2025-01-21 ENCOUNTER — PATIENT MESSAGE (OUTPATIENT)
Dept: FAMILY MEDICINE | Facility: CLINIC | Age: 25
End: 2025-01-21
Payer: COMMERCIAL

## 2025-01-30 ENCOUNTER — PATIENT MESSAGE (OUTPATIENT)
Dept: CARDIOLOGY | Facility: HOSPITAL | Age: 25
End: 2025-01-30
Payer: COMMERCIAL

## 2025-03-12 NOTE — ED PROVIDER NOTES
03/12/25 10:09 AM    The office's request has been received and reviewed. At this time we are unable to remove PCP. The provider is identified as their PCP via RTE and /or  on the insurance card.      The patient must contact their insurance company and update the PCP with them.     Jasper Sanchez   Encounter Date: 9/9/2018       History     Chief Complaint   Patient presents with    Vomiting     emesis x 1 day - pt has hx of dm and thinks he is in dka - also has hx of cf and c/o sob      18-year-old male with history of poorly controlled diabetes, CF, ADHD, anxiety, presents today for evaluation of hyperglycemia and vomiting. Patient states that this afternoon around 1:00 p.m. he began to feel tired and on well. He then would began feeling nauseated and developed vomiting. Patient's blood sugars were elevated today to greater than 400.  He would have ketones in his urine.  Patient reports continuing to vomit throughout the evening and has been unable to keep anything down.  He wears currently a insulin pump.  He denies any fever or recent illness.  He does report developing chest pain after multiple episodes of vomiting. He has not taken any medication for the nausea or vomiting.  His last admission for DKA was in June of this year.  His hemoglobin A1c earlier this week was 12.          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     Diabetes mellitus 3/1/2012    No prev history of DKA    GERD (gastroesophageal reflux disease)     Hashimoto's thyroiditis     Headache(784.0)     has frequent HA and sometimes responds to Ibuprofen    Mood disorder     Otitis media     Pneumothorax     Thyroid disease     Wheezing      Past Surgical History:   Procedure Laterality Date    ADENOIDECTOMY      ADENOIDECTOMY      BRONCHOSCOPY  6/20/2016         BRONCHOSCOPY N/A 8/17/2017    Performed [facility] at [facility] 2ND FLR    BRONCHOSCOPY - fiberoptic Bilateral 6/17/2016    Performed by [facility] at [facility] OR 2ND FLR    MD BRONCHOSCOPY,[id number] W [facility]AV[facility]  8/17/2017         TYMPANOSTOMY TUBE PLACEMENT  June of 2002     TYMPANOSTOMY TUBE PLACEMENT       Family History   Problem Relation Age of Onset    Cystic fibrosis Sister     Cystic fibrosis gene carrier Sister     Diabetes Neg Hx     Asthma Neg Hx     Cancer Neg Hx     Early death Neg Hx     Heart disease Neg Hx     Kidney disease Neg Hx     Hypertension Neg Hx     Thyroid disease Neg Hx      Social History     Tobacco Use    Smoking status: Former Smoker    Smokeless tobacco: Never Used    Tobacco comment: dad smokes   Substance Use Topics    Alcohol use: Yes     Comment: in past    Drug use: No     Review of Systems   Constitutional: Positive for activity change, appetite change and fatigue. Negative for fever.   HENT: Negative.    Eyes: Negative.    Respiratory: Negative.    Cardiovascular: Positive for chest pain.   Gastrointestinal: Positive for nausea and vomiting.   Genitourinary: Negative.    Musculoskeletal: Negative.    Neurological: Negative.    Hematological: Negative.    Psychiatric/Behavioral: Negative.        Physical Exam     Initial Vitals [09/09/18 0402]   BP Pulse Resp Temp SpO2   130/82 (!) 128 20 98.1 °F (36.7 °C) 97 %      MAP       --         Physical Exam    Vitals reviewed.  Constitutional: He appears distressed.   HENT:   Head: Normocephalic.   Right Ear: External ear normal.   Left Ear: External ear normal.   Nose: Nose normal.   Mouth/Throat: Oropharynx is clear and moist.   Eyes: Conjunctivae and EOM are normal. Pupils are equal, round, and reactive to light.   Neck: Normal range of motion.   Cardiovascular: Regular rhythm, normal heart sounds and intact distal pulses. Exam reveals no gallop and no friction rub.    No murmur heard.  Tachycardic   Pulmonary/Chest: Breath sounds normal. No respiratory distress. He has no wheezes.   Abdominal: Soft. Bowel sounds are normal. He exhibits no distension. There is no tenderness. There is no rebound.   Musculoskeletal: Normal range of motion.   Neurological: He is alert and oriented to  person, place, and time. He has normal strength. No cranial nerve deficit.   Skin: Skin is warm. Capillary refill takes 2 to 3 seconds.         ED Course   Procedures  Labs Reviewed   ISTAT PROCEDURE - Abnormal; Notable for the following components:       Result Value    POC PH 7.199 (*)     POC PCO2 31.6 (*)     POC HCO3 12.3 (*)     POC SATURATED O2 67 (*)     POC TCO2 13 (*)     All other components within normal limits   URINALYSIS, REFLEX TO URINE CULTURE   BASIC METABOLIC PANEL   MAGNESIUM   PHOSPHORUS   CBC W/ AUTO DIFFERENTIAL   HEMOGLOBIN A1C   BETA - HYDROXYBUTYRATE, SERUM   URINALYSIS, REFLEX TO URINE CULTURE   POCT GLUCOSE MONITORING CONTINUOUS          Imaging Results          X-Ray Chest 1 View (In process)                  Medical Decision Making:   Initial Assessment:   18-year-old male with a history of CF and diabetes presents today for evaluation of hyperglycemia and vomiting.  Clinical Tests:   Lab Tests: Ordered  Radiological Study: Ordered and Reviewed  Medical Tests: Ordered and Reviewed  ED Management:  Patient with blood sugar greater than 500.    Patient's pH on his VBG was 7.19.    Ordered normal saline bolus to be given.  Zofran for nausea and vomiting.    Insulin drip at 0.1 U per kilos per hour ordered.    Will contact Pediatric ICU for likely admission for continuous insulin drip and closer monitoring.  Will monitor her mental status closely.      Chest x-ray ordered due to patient's complaint of chest pain after retching.    Patient appears to have a pneumomediastinum on chest x-ray.  Patient placed on a non-rebreather oxygen mask.                        Clinical Impression:   The primary encounter diagnosis was Diabetic ketoacidosis without coma associated with diabetes mellitus due to underlying condition. A diagnosis of Chest pain was also pertinent to this visit.                             Salinas Mitchell MD  09/10/18 0737

## 2025-03-14 ENCOUNTER — PATIENT MESSAGE (OUTPATIENT)
Dept: FAMILY MEDICINE | Facility: CLINIC | Age: 25
End: 2025-03-14
Payer: COMMERCIAL

## 2025-08-19 ENCOUNTER — PATIENT MESSAGE (OUTPATIENT)
Dept: ADMINISTRATIVE | Facility: HOSPITAL | Age: 25
End: 2025-08-19
Payer: COMMERCIAL

## (undated) DEVICE — LUBRICANT SURGILUBE 2 OZ

## (undated) DEVICE — ADAPTER SWIVEL

## (undated) DEVICE — SEE MEDLINE ITEM 146313

## (undated) DEVICE — CONTAINER SPECIMEN STRL 4OZ

## (undated) DEVICE — GAUZE SPONGE 4X4 12PLY

## (undated) DEVICE — NDL 18GA X1 1/2 REG BEVEL